# Patient Record
Sex: FEMALE | Race: WHITE | NOT HISPANIC OR LATINO | Employment: UNEMPLOYED | URBAN - METROPOLITAN AREA
[De-identification: names, ages, dates, MRNs, and addresses within clinical notes are randomized per-mention and may not be internally consistent; named-entity substitution may affect disease eponyms.]

---

## 2020-07-29 ENCOUNTER — OFFICE VISIT (OUTPATIENT)
Dept: FAMILY MEDICINE | Facility: CLINIC | Age: 60
End: 2020-07-29
Payer: COMMERCIAL

## 2020-07-29 VITALS
DIASTOLIC BLOOD PRESSURE: 82 MMHG | TEMPERATURE: 99 F | SYSTOLIC BLOOD PRESSURE: 120 MMHG | HEART RATE: 72 BPM | HEIGHT: 62 IN | WEIGHT: 293 LBS | BODY MASS INDEX: 53.92 KG/M2

## 2020-07-29 DIAGNOSIS — Z76.89 ENCOUNTER TO ESTABLISH CARE WITH NEW DOCTOR: ICD-10-CM

## 2020-07-29 DIAGNOSIS — Z12.11 COLON CANCER SCREENING: ICD-10-CM

## 2020-07-29 DIAGNOSIS — R30.0 DYSURIA: ICD-10-CM

## 2020-07-29 DIAGNOSIS — R35.0 FREQUENCY OF URINATION: ICD-10-CM

## 2020-07-29 DIAGNOSIS — N30.90 CYSTITIS: Primary | ICD-10-CM

## 2020-07-29 DIAGNOSIS — K21.9 GASTROESOPHAGEAL REFLUX DISEASE, ESOPHAGITIS PRESENCE NOT SPECIFIED: ICD-10-CM

## 2020-07-29 DIAGNOSIS — Z01.89 ENCOUNTER FOR ROUTINE LABORATORY TESTING: ICD-10-CM

## 2020-07-29 DIAGNOSIS — E66.01 CLASS 3 SEVERE OBESITY WITH BODY MASS INDEX (BMI) OF 50.0 TO 59.9 IN ADULT, UNSPECIFIED OBESITY TYPE, UNSPECIFIED WHETHER SERIOUS COMORBIDITY PRESENT: ICD-10-CM

## 2020-07-29 DIAGNOSIS — R60.0 BILATERAL LOWER EXTREMITY EDEMA: ICD-10-CM

## 2020-07-29 PROCEDURE — 81001 URINALYSIS AUTO W/SCOPE: CPT

## 2020-07-29 PROCEDURE — 99999 PR PBB SHADOW E&M-NEW PATIENT-LVL III: CPT | Mod: PBBFAC,,, | Performed by: INTERNAL MEDICINE

## 2020-07-29 PROCEDURE — 99203 OFFICE O/P NEW LOW 30 MIN: CPT | Mod: S$GLB,,, | Performed by: INTERNAL MEDICINE

## 2020-07-29 PROCEDURE — 3008F BODY MASS INDEX DOCD: CPT | Mod: CPTII,S$GLB,, | Performed by: INTERNAL MEDICINE

## 2020-07-29 PROCEDURE — 99999 PR PBB SHADOW E&M-NEW PATIENT-LVL III: ICD-10-PCS | Mod: PBBFAC,,, | Performed by: INTERNAL MEDICINE

## 2020-07-29 PROCEDURE — 87086 URINE CULTURE/COLONY COUNT: CPT

## 2020-07-29 PROCEDURE — 99203 PR OFFICE/OUTPT VISIT, NEW, LEVL III, 30-44 MIN: ICD-10-PCS | Mod: S$GLB,,, | Performed by: INTERNAL MEDICINE

## 2020-07-29 PROCEDURE — 87077 CULTURE AEROBIC IDENTIFY: CPT

## 2020-07-29 PROCEDURE — 87186 SC STD MICRODIL/AGAR DIL: CPT

## 2020-07-29 PROCEDURE — 3008F PR BODY MASS INDEX (BMI) DOCUMENTED: ICD-10-PCS | Mod: CPTII,S$GLB,, | Performed by: INTERNAL MEDICINE

## 2020-07-29 PROCEDURE — 87088 URINE BACTERIA CULTURE: CPT

## 2020-07-29 RX ORDER — GLUCOSAMINE/CHONDR SU A SOD 167-133 MG
500 CAPSULE ORAL NIGHTLY
COMMUNITY
End: 2020-11-10

## 2020-07-29 RX ORDER — IBUPROFEN 100 MG/5ML
1000 SUSPENSION, ORAL (FINAL DOSE FORM) ORAL DAILY
COMMUNITY

## 2020-07-29 RX ORDER — NITROFURANTOIN (MACROCRYSTALS) 100 MG/1
100 CAPSULE ORAL EVERY 6 HOURS
Qty: 20 CAPSULE | Refills: 0 | Status: SHIPPED | OUTPATIENT
Start: 2020-07-29 | End: 2020-07-30

## 2020-07-29 RX ORDER — GARLIC 1000 MG
1000 CAPSULE ORAL DAILY
COMMUNITY
End: 2020-11-10

## 2020-07-29 RX ORDER — PANTOPRAZOLE SODIUM 40 MG/1
40 TABLET, DELAYED RELEASE ORAL DAILY
COMMUNITY
End: 2020-11-10 | Stop reason: SDUPTHER

## 2020-07-29 NOTE — PROGRESS NOTES
Subjective:       Patient ID: Concepcion Oneill is a 60 y.o. female.      Patient here today to establish with me as her new PCP at [facility]. Past medical history and surgical history delineated and noted. Social medical history and family medical history also delineated and noted.  Review of systems obtained at length prior to physical exam being performed.  Medications reviewed as well and addressed.  Labs reviewed and ordered for follow-up as needed.      Chief Complaint: Urinary Frequency (Urinary frequency w/ some burning. S/S started Mon)    HPI  Here to est care w me as her new PCP in [facility]. .PMH and surgical hx delineated and noted; SMH/FMH also delineated and noted. ROS obtained at length prior to physical exam being performed.  GERD: on Pantoprazole 40 mg a day. Can skip days.   Cystitis:  Monday started with frequency and dysuria and yesterday as well with a pressure sensation pressure sensations okay today.  No fever or chills; POCT for urinalysis with trace leukocytes.  Class 3 obesity:  BMI 55.06; knows the importance of regular exercise and caloric restriction help her weight come down.  Bilateral lower extremity edema:  Likely from venous insufficiency and obesity.  Maintain less than 2 g sodium diet and elevate lower extremities at home; compression stockings would also help  Colon cancer screening:  Total colonoscopy requested.  Encounter for lab testing:  Needs routine labs ordered    Past Medical History:   Diagnosis Date    Obesity        Past Surgical History:   Procedure Laterality Date     SECTION      LGA; shoulders too big.     HYSTERECTOMY      w BSO; pregnant in tube; then  when pregnant in ovary later had hysterec samantha.         Social History     Tobacco Use    Smoking status: Never Smoker    Smokeless tobacco: Never Used   Substance Use Topics    Alcohol use: Not Currently     Comment: Just for Holidays    Drug use: Never        Family History   Problem Relation Age of Onset    Diabetes Sister     Heart disease Maternal Aunt     Diabetes Sister     Cancer Other         Current Outpatient Medications on File Prior to Visit   Medication Sig Dispense Refill    APPLE CIDER VINEGAR ORAL Take 450 mg by mouth 2 (two) times a day.      ascorbic acid, vitamin C, (VITAMIN C) 1000 MG tablet Take 1,000 mg by mouth once daily.      fish oil/borage/flax/om3,6,9 1 (OMEGA 3-6-9 COMPLEX ORAL) Take 1 tablet by mouth once daily.      garlic 1,000 mg Cap Take 1,000 mg by mouth once daily.      gluc ambrose/chondro ambrose A/vit C/Mn (GLUCOSAMINE 1500 COMPLEX ORAL) Take 2 tablets by mouth once daily.      glucosamine/chondr ambrose A sod (GLUCOSAMINE-CHONDROITIN) 1,500-1,200 mg/30 mL Liqd Take by mouth.      multivitamin with minerals tablet Take 1 tablet by mouth once daily.      niacin 500 MG Tab Take 500 mg by mouth every evening.      pantoprazole (PROTONIX) 40 MG tablet Take 40 mg by mouth once daily.       No current facility-administered medications on file prior to visit.         Review of Systems   Constitutional: Negative for appetite change and fever.   HENT: Negative for congestion, postnasal drip, rhinorrhea and sinus pressure.    Eyes: Negative for discharge and itching.   Respiratory: Negative for cough, chest tightness, shortness of breath and wheezing.    Cardiovascular: Negative for chest pain, palpitations and leg swelling.   Gastrointestinal: Negative for abdominal distention, abdominal pain, blood in stool, constipation, diarrhea, nausea and vomiting.   Endocrine: Negative for polydipsia, polyphagia and polyuria.   Genitourinary: Negative for dysuria and hematuria.   Musculoskeletal: Negative for arthralgias and myalgias.   Skin: Negative for rash.   Allergic/Immunologic: Negative for environmental allergies and food allergies.   Neurological: Negative for tremors, seizures and syncope.   Hematological: Negative for adenopathy. Does not  "bruise/bleed easily.       Objective:      Vitals:    07/29/20 1404   BP: 120/82   BP Location: Left arm   Pulse: 72   Temp: 99 °F (37.2 °C)   TempSrc: Temporal   Weight: (!) 136.5 kg (301 lb 0.6 oz)   Height: 5' 2" (1.575 m)     Body mass index is 55.06 kg/m².    Physical Exam  Vitals signs reviewed.   Constitutional:       Appearance: She is well-developed.   HENT:      Head: Normocephalic and atraumatic.   Neck:      Musculoskeletal: Normal range of motion and neck supple.      Thyroid: No thyromegaly.   Cardiovascular:      Rate and Rhythm: Normal rate and regular rhythm.      Heart sounds: Normal heart sounds. No murmur. No gallop.    Pulmonary:      Effort: Pulmonary effort is normal. No respiratory distress.      Breath sounds: Normal breath sounds. No wheezing or rales.   Abdominal:      General: Bowel sounds are normal. There is no distension.      Palpations: Abdomen is soft.      Tenderness: There is no abdominal tenderness. There is no guarding or rebound.   Musculoskeletal: Normal range of motion.      Right lower leg: Edema present.      Left lower leg: Edema present.      Comments: 2+ david LE edema; spider veins; no claf tenderness to palp.    Lymphadenopathy:      Cervical: No cervical adenopathy.   Skin:     Findings: No rash.   Neurological:      Mental Status: She is alert and oriented to person, place, and time.      Comments: Moves all 4 extremities fine.   Psychiatric:         Behavior: Behavior normal.         Thought Content: Thought content normal.         Assessment:       1. Cystitis    2. Dysuria    3. Frequency of urination    4. Encounter to establish care with new doctor    5. Encounter for routine laboratory testing    6. Class 3 severe obesity with body mass index (BMI) of 50.0 to 59.9 in adult, unspecified obesity type, unspecified whether serious comorbidity present    7. Bilateral lower extremity edema    8. Colon cancer screening    9. Gastroesophageal reflux disease, esophagitis " presence not specified        Plan:       Cystitis: push fluids.  Call if symptoms on resolved.  Obtain urine microscopic and culture  -     nitrofurantoin (MACRODANTIN) 100 MG capsule; Take 1 capsule (100 mg total) by mouth every 6 (six) hours.  Dispense: 20 capsule; Refill: 0    Dysuria; AZO otc for relief.   -     POCT urine dipstick without microscope    Frequency of urination  -     POCT urine dipstick without microscope    Encounter to establish care with new doctor    Encounter for routine laboratory testing: wait on old records per pt request before ordewring.  Routine labs to be ordered    Class 3 severe obesity with body mass index (BMI) of 50.0 to 59.9 in adult, unspecified obesity type, unspecified whether serious comorbidity present: Caloric restriction w regular exercise and weight reduction.    Bilateral lower extremity edema: Maintain less than 2 g sodium diet; elevate lower extremities at home; use compression stockings if possible.    Colon cancer screening  -     Case request GI: COLONOSCOPY    GERD: No bedtime snacks; weight reduction. Cont pantoprazole.

## 2020-07-29 NOTE — PATIENT INSTRUCTIONS
Cystitis: push fluids.   -     nitrofurantoin (MACRODANTIN) 100 MG capsule; Take 1 capsule (100 mg total) by mouth every 6 (six) hours.  Dispense: 20 capsule; Refill: 0    Dysuria; AZO otc for relief.   -     POCT urine dipstick without microscope    Frequency of urination  -     POCT urine dipstick without microscope    Encounter to establish care with new doctor    Encounter for routine laboratory testing: wait on old records per pt request before ordewring.     Class 3 severe obesity with body mass index (BMI) of 50.0 to 59.9 in adult, unspecified obesity type, unspecified whether serious comorbidity present: Caloric restriction w regular exercise and weight reduction.    Bilateral lower extremity edema: Maintain less than 2 g sodium diet; elevate lower extremities at home; use compression stockings if possible.    Colon cancer screening  -     Case request GI: COLONOSCOPY    GERD: No bedtime snacks; weight reduction. Cont pantoprazole.

## 2020-07-30 ENCOUNTER — TELEPHONE (OUTPATIENT)
Dept: FAMILY MEDICINE | Facility: CLINIC | Age: 60
End: 2020-07-30

## 2020-07-30 DIAGNOSIS — N30.00 ACUTE CYSTITIS WITHOUT HEMATURIA: Primary | ICD-10-CM

## 2020-07-30 LAB
BACTERIA #/AREA URNS AUTO: NORMAL /HPF
MICROSCOPIC COMMENT: NORMAL
RBC #/AREA URNS AUTO: 1 /HPF (ref 0–4)
SQUAMOUS #/AREA URNS AUTO: 1 /HPF
WBC #/AREA URNS AUTO: 3 /HPF (ref 0–5)

## 2020-07-30 RX ORDER — CEPHALEXIN 500 MG/1
500 CAPSULE ORAL EVERY 12 HOURS
Qty: 14 CAPSULE | Refills: 0 | Status: SHIPPED | OUTPATIENT
Start: 2020-07-30 | End: 2020-08-06

## 2020-07-30 NOTE — TELEPHONE ENCOUNTER
Please have patient stop the nitrofurantoin.  Please have her continue to push fluids during the day.  Please tell her I sent in cephalexin or generic Keflex 500 mg every 12 hr for her to take for 7 days to her pharmacist in nitrofurantoin's place

## 2020-07-30 NOTE — TELEPHONE ENCOUNTER
----- Message from Claudette Holbrook sent at 7/30/2020  9:01 AM CDT -----  Regarding: Medication Issues  Contact: Patient  Type: Needs Medical Advice    Who Called:  patient    Symptoms (please be specific): nausea, stomach cramps.    How long has patient had these symptoms:  couple days    Pharmacy name and phone #:  [id number] - [place] - [address] [address] [phone] (Phone)     Best Call Back Number: [phone]    Additional Information:   Patient was put on nitrofurantoin (MACRODANTIN) 100 MG capsule on 7/29 & believes it is causing these side effects.  Requesting call to discuss & maybe new med sent in,

## 2020-07-30 NOTE — TELEPHONE ENCOUNTER
Spoke with pt and she states the symptoms started last night when she took her second dose of medication. Cramps and nausea within an hour of taking medication.  Please advise if there is an alternative medication the pt can take.

## 2020-07-30 NOTE — TELEPHONE ENCOUNTER
Spoke w/ pt. Advised as recommended. Pt agreed and at next dose due for antibiotic, she will start the keflex.

## 2020-08-01 LAB — BACTERIA UR CULT: ABNORMAL

## 2020-08-09 ENCOUNTER — TELEPHONE (OUTPATIENT)
Dept: FAMILY MEDICINE | Facility: CLINIC | Age: 60
End: 2020-08-09

## 2020-08-09 PROBLEM — Z12.11 COLON CANCER SCREENING: Status: ACTIVE | Noted: 2020-08-09

## 2020-08-09 PROBLEM — Z01.89 ENCOUNTER FOR ROUTINE LABORATORY TESTING: Status: ACTIVE | Noted: 2020-08-09

## 2020-08-09 PROBLEM — R60.0 BILATERAL LOWER EXTREMITY EDEMA: Status: ACTIVE | Noted: 2020-08-09

## 2020-08-09 PROBLEM — K21.9 GASTROESOPHAGEAL REFLUX DISEASE: Status: ACTIVE | Noted: 2020-08-09

## 2020-08-09 NOTE — TELEPHONE ENCOUNTER
Please call patient and ask her to schedule her follow-up appointment with me in 3 months from her recent visit.  Please also see  that a request has been sent for old records from Dr. Santos Muniz her [facility].  Please also tell patient that the nitrofurantoin should have covered Proteus mirabilis she grew in her urine culture.  But let me know if she is still having symptoms after the antibiotics. Thank you

## 2020-08-14 DIAGNOSIS — Z12.39 BREAST CANCER SCREENING: ICD-10-CM

## 2020-09-10 ENCOUNTER — TELEPHONE (OUTPATIENT)
Dept: FAMILY MEDICINE | Facility: CLINIC | Age: 60
End: 2020-09-10

## 2020-09-10 ENCOUNTER — TELEPHONE (OUTPATIENT)
Dept: GASTROENTEROLOGY | Facility: CLINIC | Age: 60
End: 2020-09-10

## 2020-09-10 DIAGNOSIS — Z01.818 PREOP TESTING: ICD-10-CM

## 2020-09-10 NOTE — TELEPHONE ENCOUNTER
----- Message from Mariya Burnette sent at 9/10/2020  3:48 PM CDT -----  Regarding: return call to Mariella  Contact: Concepcion rosas  Type:  Patient Returning Call    Who Called:  Concepcion  Who Left Message for Patient:  Mariella  Does the patient know what this is regarding?:  colonoscopy  Best Call Back Number:  [phone]  Additional Information:  Pls call regarding

## 2020-09-12 ENCOUNTER — LAB VISIT (OUTPATIENT)
Dept: FAMILY MEDICINE | Facility: CLINIC | Age: 60
End: 2020-09-12
Payer: COMMERCIAL

## 2020-09-12 DIAGNOSIS — Z01.818 PREOP TESTING: ICD-10-CM

## 2020-09-12 LAB — SARS-COV-2 RNA RESP QL NAA+PROBE: NOT DETECTED

## 2020-09-12 PROCEDURE — U0003 INFECTIOUS AGENT DETECTION BY NUCLEIC ACID (DNA OR RNA); SEVERE ACUTE RESPIRATORY SYNDROME CORONAVIRUS 2 (SARS-COV-2) (CORONAVIRUS DISEASE [COVID-19]), AMPLIFIED PROBE TECHNIQUE, MAKING USE OF HIGH THROUGHPUT TECHNOLOGIES AS DESCRIBED BY CMS-2020-01-R: HCPCS

## 2020-09-14 ENCOUNTER — ANESTHESIA EVENT (OUTPATIENT)
Dept: ENDOSCOPY | Facility: HOSPITAL | Age: 60
End: 2020-09-14
Payer: COMMERCIAL

## 2020-09-15 ENCOUNTER — ANESTHESIA (OUTPATIENT)
Dept: ENDOSCOPY | Facility: HOSPITAL | Age: 60
End: 2020-09-15
Payer: COMMERCIAL

## 2020-09-15 ENCOUNTER — HOSPITAL ENCOUNTER (OUTPATIENT)
Facility: HOSPITAL | Age: 60
Discharge: HOME | End: 2020-09-15
Attending: INTERNAL MEDICINE | Admitting: INTERNAL MEDICINE
Payer: COMMERCIAL

## 2020-09-15 DIAGNOSIS — Z12.11 SCREEN FOR COLON CANCER: ICD-10-CM

## 2020-09-15 PROCEDURE — 63600175 PHARM REV CODE 636 W HCPCS: Mod: PO | Performed by: NURSE ANESTHETIST, CERTIFIED REGISTERED

## 2020-09-15 PROCEDURE — 45385 COLONOSCOPY W/LESION REMOVAL: CPT | Mod: 33,,, | Performed by: INTERNAL MEDICINE

## 2020-09-15 PROCEDURE — 45385 PR COLONOSCOPY,REMV LESN,SNARE: ICD-10-PCS | Mod: 33,,, | Performed by: INTERNAL MEDICINE

## 2020-09-15 PROCEDURE — D9220A PRA ANESTHESIA: ICD-10-PCS | Mod: ANES,,, | Performed by: ANESTHESIOLOGY

## 2020-09-15 PROCEDURE — D9220A PRA ANESTHESIA: Mod: CRNA,,, | Performed by: NURSE ANESTHETIST, CERTIFIED REGISTERED

## 2020-09-15 PROCEDURE — 25000003 PHARM REV CODE 250: Mod: PO | Performed by: NURSE ANESTHETIST, CERTIFIED REGISTERED

## 2020-09-15 PROCEDURE — 88305 TISSUE EXAM BY PATHOLOGIST: ICD-10-PCS | Mod: 26,,, | Performed by: PATHOLOGY

## 2020-09-15 PROCEDURE — 45385 COLONOSCOPY W/LESION REMOVAL: CPT | Mod: PO | Performed by: INTERNAL MEDICINE

## 2020-09-15 PROCEDURE — 88305 TISSUE EXAM BY PATHOLOGIST: CPT | Mod: 26,,, | Performed by: PATHOLOGY

## 2020-09-15 PROCEDURE — D9220A PRA ANESTHESIA: ICD-10-PCS | Mod: CRNA,,, | Performed by: NURSE ANESTHETIST, CERTIFIED REGISTERED

## 2020-09-15 PROCEDURE — D9220A PRA ANESTHESIA: Mod: ANES,,, | Performed by: ANESTHESIOLOGY

## 2020-09-15 PROCEDURE — 88305 TISSUE EXAM BY PATHOLOGIST: CPT | Performed by: PATHOLOGY

## 2020-09-15 PROCEDURE — 37000009 HC ANESTHESIA EA ADD 15 MINS: Mod: PO | Performed by: INTERNAL MEDICINE

## 2020-09-15 PROCEDURE — 37000008 HC ANESTHESIA 1ST 15 MINUTES: Mod: PO | Performed by: INTERNAL MEDICINE

## 2020-09-15 PROCEDURE — 63600175 PHARM REV CODE 636 W HCPCS: Mod: PO | Performed by: INTERNAL MEDICINE

## 2020-09-15 PROCEDURE — 27201089 HC SNARE, DISP (ANY): Mod: PO | Performed by: INTERNAL MEDICINE

## 2020-09-15 RX ORDER — SODIUM CHLORIDE, SODIUM LACTATE, POTASSIUM CHLORIDE, CALCIUM CHLORIDE 600; 310; 30; 20 MG/100ML; MG/100ML; MG/100ML; MG/100ML
INJECTION, SOLUTION INTRAVENOUS CONTINUOUS
Status: DISCONTINUED | OUTPATIENT
Start: 2020-09-15 | End: 2020-09-15 | Stop reason: HOSPADM

## 2020-09-15 RX ORDER — PROPOFOL 10 MG/ML
VIAL (ML) INTRAVENOUS
Status: DISCONTINUED | OUTPATIENT
Start: 2020-09-15 | End: 2020-09-15

## 2020-09-15 RX ORDER — SODIUM CHLORIDE 0.9 % (FLUSH) 0.9 %
10 SYRINGE (ML) INJECTION
Status: DISCONTINUED | OUTPATIENT
Start: 2020-09-15 | End: 2020-09-15 | Stop reason: HOSPADM

## 2020-09-15 RX ORDER — LIDOCAINE HCL/PF 100 MG/5ML
SYRINGE (ML) INTRAVENOUS
Status: DISCONTINUED | OUTPATIENT
Start: 2020-09-15 | End: 2020-09-15

## 2020-09-15 RX ADMIN — PROPOFOL 100 MG: 10 INJECTION, EMULSION INTRAVENOUS at 07:09

## 2020-09-15 RX ADMIN — PROPOFOL 50 MG: 10 INJECTION, EMULSION INTRAVENOUS at 07:09

## 2020-09-15 RX ADMIN — LIDOCAINE HYDROCHLORIDE 100 MG: 20 INJECTION, SOLUTION INTRAVENOUS at 07:09

## 2020-09-15 RX ADMIN — PROPOFOL 150 MG: 10 INJECTION, EMULSION INTRAVENOUS at 07:09

## 2020-09-15 RX ADMIN — SODIUM CHLORIDE, SODIUM LACTATE, POTASSIUM CHLORIDE, AND CALCIUM CHLORIDE: .6; .31; .03; .02 INJECTION, SOLUTION INTRAVENOUS at 06:09

## 2020-09-15 NOTE — TRANSFER OF CARE
"Anesthesia Transfer of Care Note    Patient: Concepcion Oneill    Procedure(s) Performed: Procedure(s) (LRB):  COLONOSCOPY (N/A)    Patient location: PACU    Anesthesia Type: general    Transport from OR: Transported from OR on room air with adequate spontaneous ventilation    Post pain: adequate analgesia    Post assessment: no apparent anesthetic complications and tolerated procedure well    Post vital signs: stable    Level of consciousness: awake    Nausea/Vomiting: no nausea/vomiting    Complications: none    Transfer of care protocol was followed      Last vitals:   Visit Vitals  BP (!) 141/76 (BP Location: Right arm, Patient Position: Sitting)   Pulse 72   Temp 36.6 °C (97.9 °F) (Skin)   Resp 16   Ht 5' 2" (1.575 m)   Wt 136.1 kg (300 lb)   SpO2 97%   Breastfeeding No   BMI 54.87 kg/m²     "

## 2020-09-15 NOTE — ANESTHESIA POSTPROCEDURE EVALUATION
Anesthesia Post Evaluation    Patient: Concepcion Oneill    Procedure(s) Performed: Procedure(s) (LRB):  COLONOSCOPY (N/A)    Final Anesthesia Type: general    Patient location during evaluation: PACU  Patient participation: Yes- Able to Participate  Level of consciousness: awake and alert  Post-procedure vital signs: reviewed and stable  Pain management: adequate  Airway patency: patent    PONV status at discharge: No PONV  Anesthetic complications: no      Cardiovascular status: blood pressure returned to baseline  Respiratory status: unassisted  Hydration status: euvolemic  Follow-up not needed.          Vitals Value Taken Time   /74 09/15/20 0800   Temp 36.4 °C (97.5 °F) 09/15/20 0726   Pulse 71 09/15/20 0800   Resp 16 09/15/20 0800   SpO2 98 % 09/15/20 0800         Event Time   Out of Recovery 08:12:32         Pain/Ronnie Score: Ronnie Score: 10 (9/15/2020  8:00 AM)

## 2020-09-15 NOTE — PLAN OF CARE
VSS, all questions answered. Denies recent fever or illness. Pt BMI: 54- Dr. Morgan with Anesthesia notified. Pt states ready for procedure.

## 2020-09-15 NOTE — H&P
History & Physical - Short Stay  Gastroenterology      SUBJECTIVE:     Procedure: Colonoscopy    Chief Complaint/Indication for Procedure: Screening    PTA Medications   Medication Sig    ascorbic acid, vitamin C, (VITAMIN C) 1000 MG tablet Take 1,000 mg by mouth once daily.    gluc ambrose/chondro ambrose A/vit C/Mn (GLUCOSAMINE 1500 COMPLEX ORAL) Take 2 tablets by mouth once daily.    glucosamine/chondr ambrose A sod (GLUCOSAMINE-CHONDROITIN) 1,500-1,200 mg/30 mL Liqd Take by mouth.    multivitamin with minerals tablet Take 1 tablet by mouth once daily.    pantoprazole (PROTONIX) 40 MG tablet Take 40 mg by mouth once daily.    APPLE CIDER VINEGAR ORAL Take 450 mg by mouth 2 (two) times a day.    fish oil/borage/flax/om3,6,9 1 (OMEGA 3-6-9 COMPLEX ORAL) Take 1 tablet by mouth once daily.    garlic 1,000 mg Cap Take 1,000 mg by mouth once daily.    niacin 500 MG Tab Take 500 mg by mouth every evening.       Review of patient's allergies indicates:   Allergen Reactions    Nitrofurantoin      Sec dose led to cramps and nausea so was discontinued        Past Medical History:   Diagnosis Date    Arthritis     Bronchitis in child     Obesity     Sleep apnea     uses cpap     Past Surgical History:   Procedure Laterality Date     SECTION      LGA; shoulders too big.     COLONOSCOPY      HYSTERECTOMY      w BSO; pregnant in tube; then  when pregnant in ovary later had hysterec samantha.      Family History   Problem Relation Age of Onset    Diabetes Sister     Heart disease Maternal Aunt     Diabetes Sister     Cancer Other     Cancer Paternal Uncle         colon cancer     Social History     Tobacco Use    Smoking status: Never Smoker    Smokeless tobacco: Never Used   Substance Use Topics    Alcohol use: Not Currently     Comment: Just for Holidays    Drug use: Never         OBJECTIVE:     Vital Signs (Most Recent)  Temp: 97.9 °F (36.6 °C) (09/15/20 0627)  Pulse: 72 (09/15/20 0627)  Resp: 16  (09/15/20 0627)  BP: (!) 141/76 (09/15/20 0627)  SpO2: 97 % (09/15/20 0627)    Physical Exam                                                        GENERAL:  Comfortable, in no acute distress.                                 HEENT EXAM:  Nonicteric.  No adenopathy.  Oropharynx is clear.               NECK:  Supple.                                                               LUNGS:  Clear.                                                               CARDIAC:  Regular rate and rhythm.  S1, S2.  No murmur.                      ABDOMEN:  Soft, positive bowel sounds, nontender.  No hepatosplenomegaly or masses.  No rebound or guarding.                                             EXTREMITIES:  No edema.     MENTAL STATUS:  Normal, alert and oriented.      ASSESSMENT/PLAN:     Assessment: Colorectal cancer screening    Plan: Colonoscopy    Anesthesia Plan: General    ASA Grade: ASA 2 - Patient with mild systemic disease with no functional limitations    MALLAMPATI SCORE:  I (soft palate, uvula, fauces, and tonsillar pillars visible)     In my medical opinion and judgment, this medical or surgical procedure was not able to be safely postponed in accordance with [place] Department of Health, Healthcare Facility Notice #2020-COVID19-ALL-007.

## 2020-09-15 NOTE — ANESTHESIA PREPROCEDURE EVALUATION
09/15/2020  Concepcion Oneill is a 60 y.o., female.    Anesthesia Evaluation    I have reviewed the Patient Summary Reports.    I have reviewed the Nursing Notes. I have reviewed the NPO Status.   I have reviewed the Medications.     Review of Systems  Anesthesia Hx:  Denies Family Hx of Anesthesia complications.   Denies Personal Hx of Anesthesia complications.   Pulmonary:   Sleep Apnea Intolerance/noncompliance for CPAP   Education provided regarding risk of obstructive sleep apnea     Hepatic/GI:   Bowel Prep. GERD        Physical Exam  General:  Malnutrition, Morbid Obesity    Airway/Jaw/Neck:  Airway Findings: Mouth Opening: Small, but > 3cm Tongue: Normal  General Airway Assessment: Adult, Possible difficult mask airway, Possible difficult intubation  Mallampati: IV  TM Distance: 4 - 6 cm  Jaw/Neck Findings:  Neck ROM: Extension Decreased, Mild  Neck Findings:  Girth Increased     Eyes/Ears/Nose:  EYES/EARS/NOSE FINDINGS: Normal    Chest/Lungs:  Chest/Lungs Findings: Normal Respiratory Rate     Heart/Vascular:  Heart Findings: Rate: Normal  Rhythm: Regular Rhythm        Mental Status:  Mental Status Findings:  Alert and Oriented, Cooperative, Anxious         Anesthesia Plan  Type of Anesthesia, risks & benefits discussed:  Anesthesia Type:  general  Patient's Preference:   Intra-op Monitoring Plan: standard ASA monitors  Intra-op Monitoring Plan Comments:   Post Op Pain Control Plan:   Post Op Pain Control Plan Comments:   Induction:   IV  Beta Blocker:  Patient is not currently on a Beta-Blocker (No further documentation required).       Informed Consent: Patient understands risks and agrees with Anesthesia plan.  Questions answered. Anesthesia consent signed with patient.  ASA Score: 4     Day of Surgery Review of History & Physical:    H&P update referred to the provider.         Ready For Surgery  From Anesthesia Perspective.

## 2020-09-15 NOTE — [employer] PATIENT INSTRUCTIONS
Discharge Summary/Instructions after an Endoscopic Procedure  Patient Name: Concepcion Oneill  Patient MRN: [id number]  Patient YOB: 1960  Tuesday, September 15, 2020  Phan Chakraborty MD  RESTRICTIONS:  During your procedure today, you received medications for sedation.  These   medications may affect your judgment, balance and coordination.  Therefore,   for 24 hours, you have the following restrictions:   - DO NOT drive a car, operate machinery, make legal/financial decisions,   sign important papers or drink alcohol.    ACTIVITY:  Today: no heavy lifting, straining or running due to procedural   sedation/anesthesia.  The following day: return to full activity including work.  DIET:  Eat and drink normally unless instructed otherwise.     TREATMENT FOR COMMON SIDE EFFECTS:  - Mild abdominal pain, nausea, belching, bloating or excessive gas:  rest,   eat lightly and use a heating pad.  - Sore Throat: treat with throat lozenges and/or gargle with warm salt   water.  - Because air was used during the procedure, expelling large amounts of air   from your rectum or belching is normal.  - If a bowel prep was taken, you may not have a bowel movement for 1-3 days.    This is normal.  SYMPTOMS TO WATCH FOR AND REPORT TO YOUR PHYSICIAN:  1. Abdominal pain or bloating, other than gas cramps.  2. Chest pain.  3. Back pain.  4. Signs of infection such as: chills or fever occurring within 24 hours   after the procedure.  5. Rectal bleeding, which would show as bright red, maroon, or black stools.   (A tablespoon of blood from the rectum is not serious, especially if   hemorrhoids are present.)  6. Vomiting.  7. Weakness or dizziness.  GO DIRECTLY TO THE NEAREST EMERGENCY ROOM IF YOU HAVE ANY OF THE FOLLOWING:      Difficulty breathing              Chills and/or fever over 101 F   Persistent vomiting and/or vomiting blood   Severe abdominal pain   Severe chest pain   Black, tarry stools   Bleeding- more than one  tablespoon   Any other symptom or condition that you feel may need urgent attention  Your doctor recommends these additional instructions:  If any biopsies were taken, your doctors clinic will contact you in 1 to 2   weeks with any results.  We are waiting for your pathology results.   Your physician has recommended a repeat colonoscopy in five years for   surveillance based on pathology results.   You are being discharged to home.  For questions, problems or results please call your physician - Phan Chakraborty MD at Work:  [phone].  EMERGENCY PHONE NUMBER: [phone], LAB RESULTS: [phone]  IF A COMPLICATION OR EMERGENCY SITUATION ARISES AND YOU ARE UNABLE TO REACH   YOUR PHYSICIAN - GO DIRECTLY TO THE EMERGENCY ROOM.  ___________________________________________  Nurse Signature  ___________________________________________  Patient/Designated Responsible Party Signature  Phan Chakraborty MD  9/15/2020 7:25:33 AM  This report has been verified and signed electronically.  [employer]

## 2020-09-15 NOTE — DISCHARGE SUMMARY
Discharge Note  Short Stay      SUMMARY     Admit Date: 9/15/2020    Attending Physician: Phan Chakraborty MD     Discharge Physician: Phan Chakraborty MD    Discharge Date: 9/15/2020 7:26 AM    Final Diagnosis: Colon cancer screening [Z12.11]    Disposition: HOME OR SELF CARE    Patient Instructions:   Current Discharge Medication List      CONTINUE these medications which have NOT CHANGED    Details   ascorbic acid, vitamin C, (VITAMIN C) 1000 MG tablet Take 1,000 mg by mouth once daily.      gluc ambrose/chondro ambrose A/vit C/Mn (GLUCOSAMINE 1500 COMPLEX ORAL) Take 2 tablets by mouth once daily.      glucosamine/chondr ambrose A sod (GLUCOSAMINE-CHONDROITIN) 1,500-1,200 mg/30 mL Liqd Take by mouth.      multivitamin with minerals tablet Take 1 tablet by mouth once daily.      pantoprazole (PROTONIX) 40 MG tablet Take 40 mg by mouth once daily.      APPLE CIDER VINEGAR ORAL Take 450 mg by mouth 2 (two) times a day.      fish oil/borage/flax/om3,6,9 1 (OMEGA 3-6-9 COMPLEX ORAL) Take 1 tablet by mouth once daily.      garlic 1,000 mg Cap Take 1,000 mg by mouth once daily.      niacin 500 MG Tab Take 500 mg by mouth every evening.             Discharge Procedure Orders (must include Diet, Follow-up, Activity)    Follow Up:  Follow up with PCP as previously scheduled  Resume routine diet.  Activity as tolerated.    No driving day of procedure.

## 2020-09-16 VITALS
HEART RATE: 71 BPM | TEMPERATURE: 98 F | BODY MASS INDEX: 53.92 KG/M2 | SYSTOLIC BLOOD PRESSURE: 120 MMHG | OXYGEN SATURATION: 98 % | HEIGHT: 62 IN | WEIGHT: 293 LBS | DIASTOLIC BLOOD PRESSURE: 74 MMHG | RESPIRATION RATE: 16 BRPM

## 2020-09-18 LAB
FINAL PATHOLOGIC DIAGNOSIS: NORMAL
GROSS: NORMAL

## 2020-09-28 ENCOUNTER — NURSE TRIAGE (OUTPATIENT)
Dept: ADMINISTRATIVE | Facility: CLINIC | Age: 60
End: 2020-09-28

## 2020-09-28 NOTE — TELEPHONE ENCOUNTER
Pt did not answer.  Per PP guidelines, no voicemail left and no further PP tracking warranted.  The pt's procedure was on 9/15/20.    Reason for Disposition   No answer.  First attempt to contact caller.  Follow-up call scheduled within 15 minutes.    Protocols used: NO CONTACT OR DUPLICATE CONTACT CALL-A-AH

## 2020-11-10 ENCOUNTER — OFFICE VISIT (OUTPATIENT)
Dept: FAMILY MEDICINE | Facility: CLINIC | Age: 60
End: 2020-11-10
Payer: COMMERCIAL

## 2020-11-10 VITALS
BODY MASS INDEX: 53.92 KG/M2 | HEIGHT: 62 IN | SYSTOLIC BLOOD PRESSURE: 142 MMHG | OXYGEN SATURATION: 98 % | DIASTOLIC BLOOD PRESSURE: 92 MMHG | TEMPERATURE: 97 F | WEIGHT: 293 LBS | HEART RATE: 79 BPM

## 2020-11-10 DIAGNOSIS — K63.5 HYPERPLASTIC COLONIC POLYP, UNSPECIFIED PART OF COLON: ICD-10-CM

## 2020-11-10 DIAGNOSIS — R03.0 ELEVATED BLOOD PRESSURE READING IN OFFICE WITHOUT DIAGNOSIS OF HYPERTENSION: ICD-10-CM

## 2020-11-10 DIAGNOSIS — R60.0 BILATERAL LOWER EXTREMITY EDEMA: Primary | ICD-10-CM

## 2020-11-10 DIAGNOSIS — Z12.11 SCREEN FOR COLON CANCER: ICD-10-CM

## 2020-11-10 DIAGNOSIS — K21.9 GASTROESOPHAGEAL REFLUX DISEASE, UNSPECIFIED WHETHER ESOPHAGITIS PRESENT: ICD-10-CM

## 2020-11-10 DIAGNOSIS — E78.49 OTHER HYPERLIPIDEMIA: ICD-10-CM

## 2020-11-10 DIAGNOSIS — E66.01 CLASS 3 SEVERE OBESITY WITH BODY MASS INDEX (BMI) OF 50.0 TO 59.9 IN ADULT, UNSPECIFIED OBESITY TYPE, UNSPECIFIED WHETHER SERIOUS COMORBIDITY PRESENT: ICD-10-CM

## 2020-11-10 DIAGNOSIS — Z01.89 ENCOUNTER FOR ROUTINE LABORATORY TESTING: ICD-10-CM

## 2020-11-10 DIAGNOSIS — Z00.00 HEALTHCARE MAINTENANCE: ICD-10-CM

## 2020-11-10 DIAGNOSIS — Z83.3 FAMILY HISTORY OF DIABETES MELLITUS (DM): ICD-10-CM

## 2020-11-10 PROCEDURE — 3008F PR BODY MASS INDEX (BMI) DOCUMENTED: ICD-10-PCS | Mod: CPTII,S$GLB,, | Performed by: INTERNAL MEDICINE

## 2020-11-10 PROCEDURE — 99999 PR PBB SHADOW E&M-EST. PATIENT-LVL III: ICD-10-PCS | Mod: PBBFAC,,, | Performed by: INTERNAL MEDICINE

## 2020-11-10 PROCEDURE — 99215 PR OFFICE/OUTPT VISIT, EST, LEVL V, 40-54 MIN: ICD-10-PCS | Mod: S$GLB,,, | Performed by: INTERNAL MEDICINE

## 2020-11-10 PROCEDURE — 99999 PR PBB SHADOW E&M-EST. PATIENT-LVL III: CPT | Mod: PBBFAC,,, | Performed by: INTERNAL MEDICINE

## 2020-11-10 PROCEDURE — 3008F BODY MASS INDEX DOCD: CPT | Mod: CPTII,S$GLB,, | Performed by: INTERNAL MEDICINE

## 2020-11-10 PROCEDURE — 99215 OFFICE O/P EST HI 40 MIN: CPT | Mod: S$GLB,,, | Performed by: INTERNAL MEDICINE

## 2020-11-10 RX ORDER — PANTOPRAZOLE SODIUM 40 MG/1
TABLET, DELAYED RELEASE ORAL
Qty: 90 TABLET | Refills: 1 | Status: SHIPPED | OUTPATIENT
Start: 2020-11-10 | End: 2023-07-27

## 2020-11-10 NOTE — PATIENT INSTRUCTIONS
Bilateral lower extremity edema: Maintain less than 2 g sodium diet; elevate lower extremities at home; use compression stockings if possible. Medium strength at 16-18 mmHg to below the knee. .     Other hyperlipidemia: Maintain low fat high fiber diet, exercise regularly. Weight reduction where indicated. Exercise regularly also  -     Comprehensive Metabolic Panel; Future; Expected date: 11/10/2020  -     Lipid Panel; Future; Expected date: 11/10/2020    Screen for colon cancer; recent TC 9/15/20 w hep flexure polyp removed; hyperplastic. Repeat TC in 5 yrs. Dr Chakraborty.     Hyperplastic colonic polyp, unspecified part of colon: high fiber diet.     Healthcare maintenance  -     Ambulatory referral/consult to Obstetrics / Gynecology; Future; Expected date: 11/17/2020 Dr TABBY Olson for breast exams, pelvic, and pap.   -     CBC Auto Differential; Future; Expected date: 11/10/2020  -     Comprehensive Metabolic Panel; Future; Expected date: 11/10/2020  -     Hemoglobin A1C; Future; Expected date: 11/10/2020  -     Lipid Panel; Future; Expected date: 11/10/2020  -     T4, Free; Future; Expected date: 11/10/2020  -     TSH; Future; Expected date: 11/10/2020    Class 3 severe obesity with body mass index (BMI) of 50.0 to 59.9 in adult, unspecified obesity type, unspecified whether serious comorbidity present: Caloric restriction w regular exercise and weight reduction.  -     Comprehensive Metabolic Panel; Future; Expected date: 11/10/2020  -     Hemoglobin A1C; Future; Expected date: 11/10/2020  -     Lipid Panel; Future; Expected date: 11/10/2020    Gastroesophageal reflux disease, unspecified whether esophagitis present: No bedtime snacks; weight reduction. Use pantoprazole 40 mg a day as needed for reflux.   -     pantoprazole (PROTONIX) 40 MG tablet; 40 mg po daily as needed for reflux.  Dispense: 90 tablet; Refill: 1    Encounter for routine laboratory testing  -     CBC Auto Differential; Future; Expected date:  11/10/2020  -     Comprehensive Metabolic Panel; Future; Expected date: 11/10/2020  -     Hemoglobin A1C; Future; Expected date: 11/10/2020  -     Lipid Panel; Future; Expected date: 11/10/2020  -     T4, Free; Future; Expected date: 11/10/2020  -     TSH; Future; Expected date: 11/10/2020

## 2020-11-10 NOTE — PROGRESS NOTES
Subjective:       Patient ID: Concepcion Oneill is a 60 y.o. female.    Chief Complaint: Follow-up    HPI Here today for reassessment; haven't been able to obtain her old labs w her old records as well yet from Dr Muniz; will re-submit request. Now is the time she would be renewing her labs anyway. Needs her mammogram redone as screen as well. Orders place 8/2020 already for her to do; she can schedule this today.  Old records reviewed in epic     Encounter for lab test:  Needs to have new orders for her labs..  Can also review old labs once her old records come in from her PCP Dr. Muniz     GERD: needs to avoid bedtime snacks. Exercise w weight reduction. Use pantoprazole 40 mg a day as needed.  Refilled for patient     Other HLP:  History of mildly elevated cholesterol in the past by patient account.  Maintain low fat with high fiber diet, exercise regularly needed. Weight reduction needed     Sampson LE edema; doing about the same. .Maintain less than 2 g sodium diet; elevate lower extremities at home; use compression stockings if possible to below the knee and medium strength at 16-18. MmHg.     Elevated BP without dx of HTN: limit salt to <2 gm Na a day. Monitor BP from time to time.  BP here 148/90 then on repeat 142/92; will reassess on follow-up after salt restriction and exercise     Colon cancer scree/ colon polyp removed: TC 9/15/20: one hyperplastic polyp removed from hepatic flexure ; repeat TC in 5 yrs; Dr Chakraborty.  Colonoscopy report and pathology reviewed with patient     Breast cancer screen: Mammogram screen due for update.  Ordered previously in August for patient; can be scheduled today  OBGYN consult placed for female preventative medicine examination     Female healthcare exam: needs Gyn for breast exam, pelvic and paps. Last exams done years ago by patient's account. Ob/Gyn consult to Dr TABBY Olson; s/p total hysterectomy w BSO [id number].      Obesity: stage 3: BMI 55.6; Caloric restriction w  regular exercise and weight reduction.  Has gained 4 lb since 09/15/2020; goal for regular exercise 5 times a week minimum 30 min as well as smaller portions with low-salt low-fat high-fiber diet.     Family history of diabetes:  Mom, 2 sisters, and paternal grandfather, all with diabetes..  Paternal grandfather reportedly lost his vision due to diabetes     Total time 9:55 a.m. through 10:58 a.m..  Greater than 50% of time spent in discussion, counseling, and review.  Labs discussed and ordered for follow-up.  OBGYN YN consult to be placed to Dr. Karissa Olson for female preventative medicine examination; mammogram screening to be scheduled with patient. Colonoscopy report results as well as pathology discussed with patient  Labs to be obtain on follow-up for annual physical after an overnight fast of 8 hr.  12 min spent on additional documentation and review afterwards.    Review of Systems   Constitutional: Negative for appetite change and fever.   HENT: Negative for congestion, postnasal drip, rhinorrhea and sinus pressure.    Eyes: Negative for discharge and itching.   Respiratory: Negative for cough, chest tightness, shortness of breath and wheezing.    Cardiovascular: Positive for leg swelling. Negative for chest pain and palpitations.        Has been chronic. Stable.    Gastrointestinal: Negative for abdominal distention, abdominal pain, blood in stool, constipation, diarrhea, nausea and vomiting.   Endocrine: Negative for polydipsia, polyphagia and polyuria.   Genitourinary: Negative for dysuria and hematuria.   Musculoskeletal: Negative for arthralgias and myalgias.   Skin: Negative for rash.   Allergic/Immunologic: Negative for environmental allergies and food allergies.   Neurological: Negative for tremors, seizures and syncope.   Hematological: Negative for adenopathy. Does not bruise/bleed easily.   Psychiatric/Behavioral: Negative for dysphoric mood. The patient is not nervous/anxious.       "  Objective:      Vitals:    11/10/20 0944   BP: (!) 148/90   BP Location: Left arm   Patient Position: Sitting   BP Method: Large (Manual)   Pulse: 79   Temp: 97 °F (36.1 °C)   TempSrc: Temporal   SpO2: 98%   Weight: (!) 137.9 kg (304 lb 0.2 oz)   Height: 5' 2" (1.575 m)     Body mass index is 55.6 kg/m².  Wt Readings from Last 3 Encounters:   11/10/20 (!) 137.9 kg (304 lb 0.2 oz)   09/15/20 136.1 kg (300 lb)   07/29/20 (!) 136.5 kg (301 lb 0.6 oz)        Physical Exam  Vitals signs reviewed.   Constitutional:       Appearance: She is well-developed.   HENT:      Head: Normocephalic and atraumatic.   Neck:      Musculoskeletal: Normal range of motion and neck supple.      Thyroid: No thyromegaly.      Vascular: No carotid bruit.   Cardiovascular:      Rate and Rhythm: Normal rate and regular rhythm.      Heart sounds: Normal heart sounds. No murmur. No gallop.    Pulmonary:      Effort: Pulmonary effort is normal. No respiratory distress.      Breath sounds: Normal breath sounds. No wheezing or rales.   Abdominal:      General: Bowel sounds are normal. There is no distension.      Palpations: Abdomen is soft.      Tenderness: There is no abdominal tenderness. There is no guarding or rebound.   Musculoskeletal: Normal range of motion.      Right lower leg: Edema present.      Left lower leg: Edema present.      Comments: 3+ david LE edema; no calf tenderness riley palp. Spider veins noted bilaterally.    Lymphadenopathy:      Cervical: No cervical adenopathy.   Skin:     Findings: No rash.   Neurological:      Mental Status: She is alert and oriented to person, place, and time.      Comments: Moves all 4 extremities fine.   Psychiatric:         Behavior: Behavior normal.         Thought Content: Thought content normal.         Assessment:       1. Bilateral lower extremity edema    2. Other hyperlipidemia    3. Screen for colon cancer    4. Hyperplastic colonic polyp, unspecified part of colon    5. Healthcare " maintenance    6. Class 3 severe obesity with body mass index (BMI) of 50.0 to 59.9 in adult, unspecified obesity type, unspecified whether serious comorbidity present    7. Gastroesophageal reflux disease, unspecified whether esophagitis present    8. Encounter for routine laboratory testing    9. Family history of diabetes mellitus (DM)    10. Elevated blood pressure reading in office without diagnosis of hypertension        Plan:       Bilateral lower extremity edema: Maintain less than 2 g sodium diet; elevate lower extremities at home; use compression stockings if possible. Medium strength at 16-18 mmHg to below the knee.  If unimproved on follow-up consideration for starting low-dose diuretic.     Other hyperlipidemia: Maintain low fat high fiber diet, exercise regularly. Weight reduction where indicated. Exercise regularly also  -     Comprehensive Metabolic Panel; Future; Expected date: 11/10/2020  -     Lipid Panel; Future; Expected date: 11/10/2020    Screen for colon cancer; recent TC 9/15/20 w hep flexure polyp removed; hyperplastic. Repeat TC in 5 yrs. Dr Chakraborty.     Hyperplastic colonic polyp, unspecified part of colon: high fiber diet.     Healthcare maintenance:  OBGYN consult placed to Dr. Karissa Olson for female preventative examination; labs also reviewed and discussed with patient as well  -     Ambulatory referral/consult to Obstetrics / Gynecology; Future; Expected date: 11/17/2020 Dr TABBY Olson for breast exams, pelvic, and pap.   -     CBC Auto Differential; Future; Expected date: 11/10/2020  -     Comprehensive Metabolic Panel; Future; Expected date: 11/10/2020  -     Hemoglobin A1C; Future; Expected date: 11/10/2020  -     Lipid Panel; Future; Expected date: 11/10/2020  -     T4, Free; Future; Expected date: 11/10/2020  -     TSH; Future; Expected date: 11/10/2020    Class 3 severe obesity with body mass index (BMI) of 50.0 to 59.9 in adult, unspecified obesity type, unspecified whether  serious comorbidity present: Caloric restriction w regular exercise and weight reduction.  -     Comprehensive Metabolic Panel; Future; Expected date: 11/10/2020  -     Hemoglobin A1C; Future; Expected date: 11/10/2020  -     Lipid Panel; Future; Expected date: 11/10/2020    FMH of DM: Mom, sisters x2; MGF as well with diabetes.  Maternal grandfather actually lost his vision to diabetes .Exercise recommended with weight reduction and low carb diet; we'll follow hemoglobin A1c's with you periodically.    Gastroesophageal reflux disease, unspecified whether esophagitis present: No bedtime snacks; weight reduction. Use pantoprazole 40 mg a day as needed for reflux.   -     pantoprazole (PROTONIX) 40 MG tablet; 40 mg po daily as needed for reflux.  Dispense: 90 tablet; Refill: 1    Encounter for routine laboratory testing  -     CBC Auto Differential; Future; Expected date: 11/10/2020  -     Comprehensive Metabolic Panel; Future; Expected date: 11/10/2020  -     Hemoglobin A1C; Future; Expected date: 11/10/2020  -     Lipid Panel; Future; Expected date: 11/10/2020  -     T4, Free; Future; Expected date: 11/10/2020  -     TSH; Future; Expected date: 11/10/2020    Elevated blood pressure in the office without a diagnosis of hypertension: Maintain < 2 Gm Na a day diet, and monitor BP  keep a log.  Reassess on follow-up for hypertension diagnosis after watching her salt and exercising if unimproved

## 2020-12-30 ENCOUNTER — PATIENT OUTREACH (OUTPATIENT)
Dept: ADMINISTRATIVE | Facility: OTHER | Age: 60
End: 2020-12-30

## 2020-12-30 NOTE — PROGRESS NOTES
Health Maintenance Due   Topic Date Due    TETANUS VACCINE  01/11/1978    Shingles Vaccine (1 of 2) 01/11/2009     Updates were requested from care everywhere.  Chart was reviewed for overdue Proactive [facility] Encounters ([facility]) topics (CRS, Breast Cancer Screening, Eye exam)  Health Maintenance has been updated.  LINKS immunization registry triggered.  LINKS not responding.

## 2021-01-05 ENCOUNTER — OFFICE VISIT (OUTPATIENT)
Dept: OBSTETRICS AND GYNECOLOGY | Facility: CLINIC | Age: 61
End: 2021-01-05
Payer: COMMERCIAL

## 2021-01-05 ENCOUNTER — HOSPITAL ENCOUNTER (OUTPATIENT)
Dept: RADIOLOGY | Facility: HOSPITAL | Age: 61
Discharge: HOME | End: 2021-01-05
Attending: OBSTETRICS & GYNECOLOGY
Payer: COMMERCIAL

## 2021-01-05 VITALS
HEIGHT: 63 IN | RESPIRATION RATE: 18 BRPM | BODY MASS INDEX: 51.91 KG/M2 | DIASTOLIC BLOOD PRESSURE: 90 MMHG | WEIGHT: 293 LBS | SYSTOLIC BLOOD PRESSURE: 138 MMHG

## 2021-01-05 DIAGNOSIS — Z12.31 ENCOUNTER FOR SCREENING MAMMOGRAM FOR BREAST CANCER: ICD-10-CM

## 2021-01-05 DIAGNOSIS — Z90.710 S/P HYSTERECTOMY WITH OOPHORECTOMY: ICD-10-CM

## 2021-01-05 DIAGNOSIS — Z01.419 ROUTINE GYNECOLOGICAL EXAMINATION: Primary | ICD-10-CM

## 2021-01-05 DIAGNOSIS — Z90.721 S/P HYSTERECTOMY WITH OOPHORECTOMY: ICD-10-CM

## 2021-01-05 DIAGNOSIS — E66.01 CLASS 3 SEVERE OBESITY WITH BODY MASS INDEX (BMI) OF 50.0 TO 59.9 IN ADULT, UNSPECIFIED OBESITY TYPE, UNSPECIFIED WHETHER SERIOUS COMORBIDITY PRESENT: ICD-10-CM

## 2021-01-05 PROCEDURE — 77067 MAMMO DIGITAL SCREENING BILAT WITH TOMO: ICD-10-PCS | Mod: 26,,, | Performed by: RADIOLOGY

## 2021-01-05 PROCEDURE — 3008F BODY MASS INDEX DOCD: CPT | Mod: CPTII,S$GLB,, | Performed by: OBSTETRICS & GYNECOLOGY

## 2021-01-05 PROCEDURE — 77063 BREAST TOMOSYNTHESIS BI: CPT | Mod: 26,,, | Performed by: RADIOLOGY

## 2021-01-05 PROCEDURE — 1126F PR PAIN SEVERITY QUANTIFIED, NO PAIN PRESENT: ICD-10-PCS | Mod: S$GLB,,, | Performed by: OBSTETRICS & GYNECOLOGY

## 2021-01-05 PROCEDURE — 77063 MAMMO DIGITAL SCREENING BILAT WITH TOMO: ICD-10-PCS | Mod: 26,,, | Performed by: RADIOLOGY

## 2021-01-05 PROCEDURE — 99386 PREV VISIT NEW AGE 40-64: CPT | Mod: S$GLB,,, | Performed by: OBSTETRICS & GYNECOLOGY

## 2021-01-05 PROCEDURE — 99999 PR PBB SHADOW E&M-EST. PATIENT-LVL IV: CPT | Mod: PBBFAC,,, | Performed by: OBSTETRICS & GYNECOLOGY

## 2021-01-05 PROCEDURE — 99999 PR PBB SHADOW E&M-EST. PATIENT-LVL IV: ICD-10-PCS | Mod: PBBFAC,,, | Performed by: OBSTETRICS & GYNECOLOGY

## 2021-01-05 PROCEDURE — 77067 SCR MAMMO BI INCL CAD: CPT | Mod: TC,PN

## 2021-01-05 PROCEDURE — 3008F PR BODY MASS INDEX (BMI) DOCUMENTED: ICD-10-PCS | Mod: CPTII,S$GLB,, | Performed by: OBSTETRICS & GYNECOLOGY

## 2021-01-05 PROCEDURE — 1126F AMNT PAIN NOTED NONE PRSNT: CPT | Mod: S$GLB,,, | Performed by: OBSTETRICS & GYNECOLOGY

## 2021-01-05 PROCEDURE — 77067 SCR MAMMO BI INCL CAD: CPT | Mod: 26,,, | Performed by: RADIOLOGY

## 2021-01-05 PROCEDURE — 99386 PR PREVENTIVE VISIT,NEW,40-64: ICD-10-PCS | Mod: S$GLB,,, | Performed by: OBSTETRICS & GYNECOLOGY

## 2021-01-05 RX ORDER — CLOTRIMAZOLE AND BETAMETHASONE DIPROPIONATE 10; .64 MG/G; MG/G
CREAM TOPICAL
Qty: 15 G | Refills: 1 | Status: SHIPPED | OUTPATIENT
Start: 2021-01-05 | End: 2022-09-21

## 2021-01-19 ENCOUNTER — LAB VISIT (OUTPATIENT)
Dept: LAB | Facility: HOSPITAL | Age: 61
End: 2021-01-19
Attending: INTERNAL MEDICINE
Payer: COMMERCIAL

## 2021-01-19 DIAGNOSIS — E78.49 OTHER HYPERLIPIDEMIA: ICD-10-CM

## 2021-01-19 DIAGNOSIS — Z01.89 ENCOUNTER FOR ROUTINE LABORATORY TESTING: ICD-10-CM

## 2021-01-19 DIAGNOSIS — Z00.00 HEALTHCARE MAINTENANCE: ICD-10-CM

## 2021-01-19 DIAGNOSIS — E66.01 CLASS 3 SEVERE OBESITY WITH BODY MASS INDEX (BMI) OF 50.0 TO 59.9 IN ADULT, UNSPECIFIED OBESITY TYPE, UNSPECIFIED WHETHER SERIOUS COMORBIDITY PRESENT: ICD-10-CM

## 2021-01-19 LAB
ALBUMIN SERPL BCP-MCNC: 3.8 G/DL (ref 3.5–5.2)
ALP SERPL-CCNC: 65 U/L (ref 55–135)
ALT SERPL W/O P-5'-P-CCNC: 28 U/L (ref 10–44)
ANION GAP SERPL CALC-SCNC: 9 MMOL/L (ref 8–16)
AST SERPL-CCNC: 23 U/L (ref 10–40)
BASOPHILS # BLD AUTO: 0.03 K/UL (ref 0–0.2)
BASOPHILS NFR BLD: 0.5 % (ref 0–1.9)
BILIRUB SERPL-MCNC: 0.4 MG/DL (ref 0.1–1)
BUN SERPL-MCNC: 16 MG/DL (ref 8–23)
CALCIUM SERPL-MCNC: 9.1 MG/DL (ref 8.7–10.5)
CHLORIDE SERPL-SCNC: 107 MMOL/L (ref 95–110)
CHOLEST SERPL-MCNC: 199 MG/DL (ref 120–199)
CHOLEST/HDLC SERPL: 4.2 {RATIO} (ref 2–5)
CO2 SERPL-SCNC: 27 MMOL/L (ref 23–29)
CREAT SERPL-MCNC: 0.8 MG/DL (ref 0.5–1.4)
DIFFERENTIAL METHOD: ABNORMAL
EOSINOPHIL # BLD AUTO: 0.1 K/UL (ref 0–0.5)
EOSINOPHIL NFR BLD: 2.3 % (ref 0–8)
ERYTHROCYTE [DISTWIDTH] IN BLOOD BY AUTOMATED COUNT: 13.2 % (ref 11.5–14.5)
EST. GFR  (AFRICAN AMERICAN): >60 ML/MIN/1.73 M^2
EST. GFR  (NON AFRICAN AMERICAN): >60 ML/MIN/1.73 M^2
ESTIMATED AVG GLUCOSE: 108 MG/DL (ref 68–131)
GLUCOSE SERPL-MCNC: 85 MG/DL (ref 70–110)
HBA1C MFR BLD HPLC: 5.4 % (ref 4–5.6)
HCT VFR BLD AUTO: 45.7 % (ref 37–48.5)
HDLC SERPL-MCNC: 47 MG/DL (ref 40–75)
HDLC SERPL: 23.6 % (ref 20–50)
HGB BLD-MCNC: 13.6 G/DL (ref 12–16)
IMM GRANULOCYTES # BLD AUTO: 0.01 K/UL (ref 0–0.04)
IMM GRANULOCYTES NFR BLD AUTO: 0.2 % (ref 0–0.5)
LDLC SERPL CALC-MCNC: 134.2 MG/DL (ref 63–159)
LYMPHOCYTES # BLD AUTO: 2.2 K/UL (ref 1–4.8)
LYMPHOCYTES NFR BLD: 36.7 % (ref 18–48)
MCH RBC QN AUTO: 30.8 PG (ref 27–31)
MCHC RBC AUTO-ENTMCNC: 29.8 G/DL (ref 32–36)
MCV RBC AUTO: 104 FL (ref 82–98)
MONOCYTES # BLD AUTO: 0.5 K/UL (ref 0.3–1)
MONOCYTES NFR BLD: 7.4 % (ref 4–15)
NEUTROPHILS # BLD AUTO: 3.2 K/UL (ref 1.8–7.7)
NEUTROPHILS NFR BLD: 52.9 % (ref 38–73)
NONHDLC SERPL-MCNC: 152 MG/DL
NRBC BLD-RTO: 0 /100 WBC
PLATELET # BLD AUTO: 288 K/UL (ref 150–350)
PMV BLD AUTO: 10.1 FL (ref 9.2–12.9)
POTASSIUM SERPL-SCNC: 4.6 MMOL/L (ref 3.5–5.1)
PROT SERPL-MCNC: 6.8 G/DL (ref 6–8.4)
RBC # BLD AUTO: 4.41 M/UL (ref 4–5.4)
SODIUM SERPL-SCNC: 143 MMOL/L (ref 136–145)
T4 FREE SERPL-MCNC: 0.87 NG/DL (ref 0.71–1.51)
TRIGL SERPL-MCNC: 89 MG/DL (ref 30–150)
TSH SERPL DL<=0.005 MIU/L-ACNC: 2.3 UIU/ML (ref 0.4–4)
WBC # BLD AUTO: 6.07 K/UL (ref 3.9–12.7)

## 2021-01-19 PROCEDURE — 85025 COMPLETE CBC W/AUTO DIFF WBC: CPT

## 2021-01-19 PROCEDURE — 84439 ASSAY OF FREE THYROXINE: CPT

## 2021-01-19 PROCEDURE — 84443 ASSAY THYROID STIM HORMONE: CPT

## 2021-01-19 PROCEDURE — 80053 COMPREHEN METABOLIC PANEL: CPT

## 2021-01-19 PROCEDURE — 36415 COLL VENOUS BLD VENIPUNCTURE: CPT | Mod: PN

## 2021-01-19 PROCEDURE — 80061 LIPID PANEL: CPT

## 2021-01-19 PROCEDURE — 83036 HEMOGLOBIN GLYCOSYLATED A1C: CPT

## 2021-01-25 ENCOUNTER — OFFICE VISIT (OUTPATIENT)
Dept: FAMILY MEDICINE | Facility: CLINIC | Age: 61
End: 2021-01-25
Payer: COMMERCIAL

## 2021-01-25 VITALS
HEIGHT: 62 IN | WEIGHT: 293 LBS | SYSTOLIC BLOOD PRESSURE: 142 MMHG | TEMPERATURE: 97 F | BODY MASS INDEX: 53.92 KG/M2 | DIASTOLIC BLOOD PRESSURE: 90 MMHG | HEART RATE: 70 BPM

## 2021-01-25 DIAGNOSIS — I87.2 VENOUS INSUFFICIENCY: ICD-10-CM

## 2021-01-25 DIAGNOSIS — G47.33 OSA ON CPAP: ICD-10-CM

## 2021-01-25 DIAGNOSIS — Z00.00 HEALTHCARE MAINTENANCE: ICD-10-CM

## 2021-01-25 DIAGNOSIS — Z01.89 ENCOUNTER FOR LABORATORY TEST: ICD-10-CM

## 2021-01-25 DIAGNOSIS — R60.0 BILATERAL LOWER EXTREMITY EDEMA: ICD-10-CM

## 2021-01-25 DIAGNOSIS — I10 UNCONTROLLED HYPERTENSION: Primary | ICD-10-CM

## 2021-01-25 DIAGNOSIS — L98.9 SCALP LESION: ICD-10-CM

## 2021-01-25 DIAGNOSIS — E78.49 OTHER HYPERLIPIDEMIA: ICD-10-CM

## 2021-01-25 DIAGNOSIS — E78.5 DYSLIPIDEMIA: ICD-10-CM

## 2021-01-25 DIAGNOSIS — Z83.438 FAMILY HISTORY OF HYPERLIPIDEMIA: ICD-10-CM

## 2021-01-25 DIAGNOSIS — E66.01 MORBID OBESITY: ICD-10-CM

## 2021-01-25 DIAGNOSIS — D75.89 MACROCYTOSIS WITHOUT ANEMIA: ICD-10-CM

## 2021-01-25 DIAGNOSIS — Z82.49 FAMILY HISTORY OF HYPERTENSION: ICD-10-CM

## 2021-01-25 PROCEDURE — 3008F BODY MASS INDEX DOCD: CPT | Mod: CPTII,S$GLB,, | Performed by: INTERNAL MEDICINE

## 2021-01-25 PROCEDURE — 99999 PR PBB SHADOW E&M-EST. PATIENT-LVL IV: ICD-10-PCS | Mod: PBBFAC,,, | Performed by: INTERNAL MEDICINE

## 2021-01-25 PROCEDURE — 3074F PR MOST RECENT SYSTOLIC BLOOD PRESSURE < 130 MM HG: ICD-10-PCS | Mod: CPTII,S$GLB,, | Performed by: INTERNAL MEDICINE

## 2021-01-25 PROCEDURE — 3074F SYST BP LT 130 MM HG: CPT | Mod: CPTII,S$GLB,, | Performed by: INTERNAL MEDICINE

## 2021-01-25 PROCEDURE — 3080F PR MOST RECENT DIASTOLIC BLOOD PRESSURE >= 90 MM HG: ICD-10-PCS | Mod: CPTII,S$GLB,, | Performed by: INTERNAL MEDICINE

## 2021-01-25 PROCEDURE — 3080F DIAST BP >= 90 MM HG: CPT | Mod: CPTII,S$GLB,, | Performed by: INTERNAL MEDICINE

## 2021-01-25 PROCEDURE — 99214 PR OFFICE/OUTPT VISIT, EST, LEVL IV, 30-39 MIN: ICD-10-PCS | Mod: S$GLB,,, | Performed by: INTERNAL MEDICINE

## 2021-01-25 PROCEDURE — 3008F PR BODY MASS INDEX (BMI) DOCUMENTED: ICD-10-PCS | Mod: CPTII,S$GLB,, | Performed by: INTERNAL MEDICINE

## 2021-01-25 PROCEDURE — 99999 PR PBB SHADOW E&M-EST. PATIENT-LVL IV: CPT | Mod: PBBFAC,,, | Performed by: INTERNAL MEDICINE

## 2021-01-25 PROCEDURE — 99214 OFFICE O/P EST MOD 30 MIN: CPT | Mod: S$GLB,,, | Performed by: INTERNAL MEDICINE

## 2021-01-25 RX ORDER — ATORVASTATIN CALCIUM 10 MG/1
10 TABLET, FILM COATED ORAL DAILY
Qty: 30 TABLET | Refills: 3 | Status: SHIPPED | OUTPATIENT
Start: 2021-01-25 | End: 2021-04-14

## 2021-01-25 RX ORDER — TRIAMTERENE AND HYDROCHLOROTHIAZIDE 37.5; 25 MG/1; MG/1
1 CAPSULE ORAL EVERY MORNING
Qty: 30 CAPSULE | Refills: 2 | Status: SHIPPED | OUTPATIENT
Start: 2021-01-25 | End: 2021-04-01 | Stop reason: SDUPTHER

## 2021-02-24 ENCOUNTER — IMMUNIZATION (OUTPATIENT)
Dept: FAMILY MEDICINE | Facility: CLINIC | Age: 61
End: 2021-02-24
Payer: COMMERCIAL

## 2021-02-24 DIAGNOSIS — Z23 NEED FOR VACCINATION: Primary | ICD-10-CM

## 2021-02-24 PROCEDURE — 91300 COVID-19, MRNA, LNP-S, PF, 30 MCG/0.3 ML DOSE VACCINE: CPT | Mod: PBBFAC | Performed by: INTERNAL MEDICINE

## 2021-03-15 ENCOUNTER — PATIENT OUTREACH (OUTPATIENT)
Dept: ADMINISTRATIVE | Facility: OTHER | Age: 61
End: 2021-03-15

## 2021-03-17 ENCOUNTER — IMMUNIZATION (OUTPATIENT)
Dept: FAMILY MEDICINE | Facility: CLINIC | Age: 61
End: 2021-03-17
Payer: COMMERCIAL

## 2021-03-17 DIAGNOSIS — Z23 NEED FOR VACCINATION: Primary | ICD-10-CM

## 2021-03-17 PROCEDURE — 91300 COVID-19, MRNA, LNP-S, PF, 30 MCG/0.3 ML DOSE VACCINE: CPT | Mod: ,,, | Performed by: INTERNAL MEDICINE

## 2021-03-17 PROCEDURE — 0002A COVID-19, MRNA, LNP-S, PF, 30 MCG/0.3 ML DOSE VACCINE: ICD-10-PCS | Mod: CV19,,, | Performed by: INTERNAL MEDICINE

## 2021-03-17 PROCEDURE — 91300 COVID-19, MRNA, LNP-S, PF, 30 MCG/0.3 ML DOSE VACCINE: ICD-10-PCS | Mod: ,,, | Performed by: INTERNAL MEDICINE

## 2021-03-17 PROCEDURE — 0002A COVID-19, MRNA, LNP-S, PF, 30 MCG/0.3 ML DOSE VACCINE: CPT | Mod: CV19,,, | Performed by: INTERNAL MEDICINE

## 2021-03-18 ENCOUNTER — OFFICE VISIT (OUTPATIENT)
Dept: DERMATOLOGY | Facility: CLINIC | Age: 61
End: 2021-03-18
Payer: COMMERCIAL

## 2021-03-18 VITALS — HEIGHT: 62 IN | WEIGHT: 293 LBS | BODY MASS INDEX: 53.92 KG/M2 | RESPIRATION RATE: 18 BRPM

## 2021-03-18 DIAGNOSIS — Z80.8 FAMILY HISTORY OF MELANOMA: Primary | ICD-10-CM

## 2021-03-18 DIAGNOSIS — L73.8 SEBACEOUS HYPERPLASIA: ICD-10-CM

## 2021-03-18 DIAGNOSIS — L81.4 LENTIGINES: ICD-10-CM

## 2021-03-18 DIAGNOSIS — D22.9 MULTIPLE BENIGN NEVI: ICD-10-CM

## 2021-03-18 DIAGNOSIS — D18.01 ANGIOMA OF SKIN: ICD-10-CM

## 2021-03-18 DIAGNOSIS — D23.9 DERMATOFIBROMA: ICD-10-CM

## 2021-03-18 DIAGNOSIS — Z12.83 SCREENING EXAM FOR SKIN CANCER: ICD-10-CM

## 2021-03-18 DIAGNOSIS — L82.1 SEBORRHEIC KERATOSES: ICD-10-CM

## 2021-03-18 PROCEDURE — 3008F PR BODY MASS INDEX (BMI) DOCUMENTED: ICD-10-PCS | Mod: CPTII,S$GLB,, | Performed by: DERMATOLOGY

## 2021-03-18 PROCEDURE — 1126F PR PAIN SEVERITY QUANTIFIED, NO PAIN PRESENT: ICD-10-PCS | Mod: S$GLB,,, | Performed by: DERMATOLOGY

## 2021-03-18 PROCEDURE — 99203 PR OFFICE/OUTPT VISIT, NEW, LEVL III, 30-44 MIN: ICD-10-PCS | Mod: S$GLB,,, | Performed by: DERMATOLOGY

## 2021-03-18 PROCEDURE — 99999 PR PBB SHADOW E&M-EST. PATIENT-LVL III: CPT | Mod: PBBFAC,,, | Performed by: DERMATOLOGY

## 2021-03-18 PROCEDURE — 99999 PR PBB SHADOW E&M-EST. PATIENT-LVL III: ICD-10-PCS | Mod: PBBFAC,,, | Performed by: DERMATOLOGY

## 2021-03-18 PROCEDURE — 3008F BODY MASS INDEX DOCD: CPT | Mod: CPTII,S$GLB,, | Performed by: DERMATOLOGY

## 2021-03-18 PROCEDURE — 99203 OFFICE O/P NEW LOW 30 MIN: CPT | Mod: S$GLB,,, | Performed by: DERMATOLOGY

## 2021-03-18 PROCEDURE — 1126F AMNT PAIN NOTED NONE PRSNT: CPT | Mod: S$GLB,,, | Performed by: DERMATOLOGY

## 2021-03-25 ENCOUNTER — LAB VISIT (OUTPATIENT)
Dept: LAB | Facility: HOSPITAL | Age: 61
End: 2021-03-25
Attending: INTERNAL MEDICINE
Payer: COMMERCIAL

## 2021-03-25 DIAGNOSIS — E78.49 OTHER HYPERLIPIDEMIA: ICD-10-CM

## 2021-03-25 DIAGNOSIS — E78.5 DYSLIPIDEMIA: ICD-10-CM

## 2021-03-25 DIAGNOSIS — D75.89 MACROCYTOSIS WITHOUT ANEMIA: ICD-10-CM

## 2021-03-25 DIAGNOSIS — I10 UNCONTROLLED HYPERTENSION: ICD-10-CM

## 2021-03-25 LAB
ALBUMIN SERPL BCP-MCNC: 3.6 G/DL (ref 3.5–5.2)
ALP SERPL-CCNC: 63 U/L (ref 55–135)
ALT SERPL W/O P-5'-P-CCNC: 28 U/L (ref 10–44)
ANION GAP SERPL CALC-SCNC: 8 MMOL/L (ref 8–16)
AST SERPL-CCNC: 22 U/L (ref 10–40)
BILIRUB SERPL-MCNC: 0.5 MG/DL (ref 0.1–1)
BUN SERPL-MCNC: 12 MG/DL (ref 8–23)
CALCIUM SERPL-MCNC: 8.5 MG/DL (ref 8.7–10.5)
CHLORIDE SERPL-SCNC: 107 MMOL/L (ref 95–110)
CHOLEST SERPL-MCNC: 140 MG/DL (ref 120–199)
CHOLEST/HDLC SERPL: 2.9 {RATIO} (ref 2–5)
CO2 SERPL-SCNC: 29 MMOL/L (ref 23–29)
CREAT SERPL-MCNC: 0.8 MG/DL (ref 0.5–1.4)
EST. GFR  (AFRICAN AMERICAN): >60 ML/MIN/1.73 M^2
EST. GFR  (NON AFRICAN AMERICAN): >60 ML/MIN/1.73 M^2
FOLATE SERPL-MCNC: 12.1 NG/ML (ref 4–24)
GLUCOSE SERPL-MCNC: 95 MG/DL (ref 70–110)
HDLC SERPL-MCNC: 48 MG/DL (ref 40–75)
HDLC SERPL: 34.3 % (ref 20–50)
LDLC SERPL CALC-MCNC: 77.2 MG/DL (ref 63–159)
MAGNESIUM SERPL-MCNC: 2.3 MG/DL (ref 1.6–2.6)
NONHDLC SERPL-MCNC: 92 MG/DL
POTASSIUM SERPL-SCNC: 3.8 MMOL/L (ref 3.5–5.1)
PROT SERPL-MCNC: 6.6 G/DL (ref 6–8.4)
SODIUM SERPL-SCNC: 144 MMOL/L (ref 136–145)
TRIGL SERPL-MCNC: 74 MG/DL (ref 30–150)
VIT B12 SERPL-MCNC: 796 PG/ML (ref 210–950)

## 2021-03-25 PROCEDURE — 80061 LIPID PANEL: CPT | Performed by: INTERNAL MEDICINE

## 2021-03-25 PROCEDURE — 80053 COMPREHEN METABOLIC PANEL: CPT | Performed by: INTERNAL MEDICINE

## 2021-03-25 PROCEDURE — 82746 ASSAY OF FOLIC ACID SERUM: CPT | Performed by: INTERNAL MEDICINE

## 2021-03-25 PROCEDURE — 82607 VITAMIN B-12: CPT | Performed by: INTERNAL MEDICINE

## 2021-03-25 PROCEDURE — 36415 COLL VENOUS BLD VENIPUNCTURE: CPT | Mod: PN | Performed by: INTERNAL MEDICINE

## 2021-03-25 PROCEDURE — 83735 ASSAY OF MAGNESIUM: CPT | Performed by: INTERNAL MEDICINE

## 2021-04-01 ENCOUNTER — OFFICE VISIT (OUTPATIENT)
Dept: FAMILY MEDICINE | Facility: CLINIC | Age: 61
End: 2021-04-01
Payer: COMMERCIAL

## 2021-04-01 VITALS
DIASTOLIC BLOOD PRESSURE: 75 MMHG | SYSTOLIC BLOOD PRESSURE: 119 MMHG | BODY MASS INDEX: 53.92 KG/M2 | WEIGHT: 293 LBS | HEART RATE: 70 BPM | HEIGHT: 62 IN

## 2021-04-01 DIAGNOSIS — D75.89 MACROCYTOSIS WITHOUT ANEMIA: ICD-10-CM

## 2021-04-01 DIAGNOSIS — Z01.89 ENCOUNTER FOR LABORATORY TEST: ICD-10-CM

## 2021-04-01 DIAGNOSIS — Z78.0 POSTMENOPAUSAL: ICD-10-CM

## 2021-04-01 DIAGNOSIS — I10 ESSENTIAL HYPERTENSION: Primary | ICD-10-CM

## 2021-04-01 DIAGNOSIS — E78.49 OTHER HYPERLIPIDEMIA: ICD-10-CM

## 2021-04-01 DIAGNOSIS — E66.01 MORBID OBESITY WITH BMI OF 50.0-59.9, ADULT: ICD-10-CM

## 2021-04-01 DIAGNOSIS — Z13.820 OSTEOPOROSIS SCREENING: ICD-10-CM

## 2021-04-01 DIAGNOSIS — R60.0 BILATERAL LOWER EXTREMITY EDEMA: ICD-10-CM

## 2021-04-01 DIAGNOSIS — E83.51 HYPOCALCEMIA: ICD-10-CM

## 2021-04-01 DIAGNOSIS — G47.33 OSA ON CPAP: ICD-10-CM

## 2021-04-01 DIAGNOSIS — K21.9 GASTROESOPHAGEAL REFLUX DISEASE, UNSPECIFIED WHETHER ESOPHAGITIS PRESENT: ICD-10-CM

## 2021-04-01 DIAGNOSIS — Z83.3 FAMILY HISTORY OF DIABETES MELLITUS (DM): ICD-10-CM

## 2021-04-01 PROBLEM — R03.0 ELEVATED BLOOD PRESSURE READING IN OFFICE WITHOUT DIAGNOSIS OF HYPERTENSION: Status: RESOLVED | Noted: 2020-11-10 | Resolved: 2021-04-01

## 2021-04-01 PROCEDURE — 3008F BODY MASS INDEX DOCD: CPT | Mod: CPTII,S$GLB,, | Performed by: INTERNAL MEDICINE

## 2021-04-01 PROCEDURE — 99214 PR OFFICE/OUTPT VISIT, EST, LEVL IV, 30-39 MIN: ICD-10-PCS | Mod: S$GLB,,, | Performed by: INTERNAL MEDICINE

## 2021-04-01 PROCEDURE — 3078F DIAST BP <80 MM HG: CPT | Mod: CPTII,S$GLB,, | Performed by: INTERNAL MEDICINE

## 2021-04-01 PROCEDURE — 1126F PR PAIN SEVERITY QUANTIFIED, NO PAIN PRESENT: ICD-10-PCS | Mod: S$GLB,,, | Performed by: INTERNAL MEDICINE

## 2021-04-01 PROCEDURE — 99214 OFFICE O/P EST MOD 30 MIN: CPT | Mod: S$GLB,,, | Performed by: INTERNAL MEDICINE

## 2021-04-01 PROCEDURE — 3074F PR MOST RECENT SYSTOLIC BLOOD PRESSURE < 130 MM HG: ICD-10-PCS | Mod: CPTII,S$GLB,, | Performed by: INTERNAL MEDICINE

## 2021-04-01 PROCEDURE — 3078F PR MOST RECENT DIASTOLIC BLOOD PRESSURE < 80 MM HG: ICD-10-PCS | Mod: CPTII,S$GLB,, | Performed by: INTERNAL MEDICINE

## 2021-04-01 PROCEDURE — 99999 PR PBB SHADOW E&M-EST. PATIENT-LVL IV: ICD-10-PCS | Mod: PBBFAC,,, | Performed by: INTERNAL MEDICINE

## 2021-04-01 PROCEDURE — 3074F SYST BP LT 130 MM HG: CPT | Mod: CPTII,S$GLB,, | Performed by: INTERNAL MEDICINE

## 2021-04-01 PROCEDURE — 3008F PR BODY MASS INDEX (BMI) DOCUMENTED: ICD-10-PCS | Mod: CPTII,S$GLB,, | Performed by: INTERNAL MEDICINE

## 2021-04-01 PROCEDURE — 99999 PR PBB SHADOW E&M-EST. PATIENT-LVL IV: CPT | Mod: PBBFAC,,, | Performed by: INTERNAL MEDICINE

## 2021-04-01 PROCEDURE — 1126F AMNT PAIN NOTED NONE PRSNT: CPT | Mod: S$GLB,,, | Performed by: INTERNAL MEDICINE

## 2021-04-01 RX ORDER — TRIAMTERENE AND HYDROCHLOROTHIAZIDE 37.5; 25 MG/1; MG/1
1 CAPSULE ORAL EVERY MORNING
Qty: 90 CAPSULE | Refills: 1 | Status: SHIPPED | OUTPATIENT
Start: 2021-04-01 | End: 2021-10-01

## 2021-04-05 ENCOUNTER — HOSPITAL ENCOUNTER (OUTPATIENT)
Dept: RADIOLOGY | Facility: HOSPITAL | Age: 61
Discharge: HOME | End: 2021-04-05
Attending: INTERNAL MEDICINE
Payer: COMMERCIAL

## 2021-04-05 DIAGNOSIS — E83.51 HYPOCALCEMIA: ICD-10-CM

## 2021-04-05 DIAGNOSIS — Z78.0 POSTMENOPAUSAL: ICD-10-CM

## 2021-04-05 PROCEDURE — 77080 DXA BONE DENSITY AXIAL: CPT | Mod: 26,,, | Performed by: RADIOLOGY

## 2021-04-05 PROCEDURE — 77080 DXA BONE DENSITY AXIAL: CPT | Mod: TC,PO

## 2021-04-05 PROCEDURE — 77080 DEXA BONE DENSITY SPINE HIP: ICD-10-PCS | Mod: 26,,, | Performed by: RADIOLOGY

## 2021-07-01 ENCOUNTER — LAB VISIT (OUTPATIENT)
Dept: LAB | Facility: HOSPITAL | Age: 61
End: 2021-07-01
Attending: INTERNAL MEDICINE
Payer: COMMERCIAL

## 2021-07-01 DIAGNOSIS — D75.89 MACROCYTOSIS WITHOUT ANEMIA: ICD-10-CM

## 2021-07-01 DIAGNOSIS — E83.51 HYPOCALCEMIA: ICD-10-CM

## 2021-07-01 DIAGNOSIS — I10 ESSENTIAL HYPERTENSION: ICD-10-CM

## 2021-07-01 LAB
25(OH)D3+25(OH)D2 SERPL-MCNC: 32 NG/ML (ref 30–96)
ANION GAP SERPL CALC-SCNC: 9 MMOL/L (ref 8–16)
BASOPHILS # BLD AUTO: 0.04 K/UL (ref 0–0.2)
BASOPHILS NFR BLD: 0.6 % (ref 0–1.9)
BUN SERPL-MCNC: 15 MG/DL (ref 8–23)
CALCIUM SERPL-MCNC: 9.2 MG/DL (ref 8.7–10.5)
CALCIUM SERPL-MCNC: 9.2 MG/DL (ref 8.7–10.5)
CHLORIDE SERPL-SCNC: 104 MMOL/L (ref 95–110)
CO2 SERPL-SCNC: 28 MMOL/L (ref 23–29)
CREAT SERPL-MCNC: 0.9 MG/DL (ref 0.5–1.4)
DIFFERENTIAL METHOD: ABNORMAL
EOSINOPHIL # BLD AUTO: 0.2 K/UL (ref 0–0.5)
EOSINOPHIL NFR BLD: 2.3 % (ref 0–8)
ERYTHROCYTE [DISTWIDTH] IN BLOOD BY AUTOMATED COUNT: 13.2 % (ref 11.5–14.5)
EST. GFR  (AFRICAN AMERICAN): >60 ML/MIN/1.73 M^2
EST. GFR  (NON AFRICAN AMERICAN): >60 ML/MIN/1.73 M^2
GLUCOSE SERPL-MCNC: 85 MG/DL (ref 70–110)
HCT VFR BLD AUTO: 40.9 % (ref 37–48.5)
HGB BLD-MCNC: 12.9 G/DL (ref 12–16)
IMM GRANULOCYTES # BLD AUTO: 0.01 K/UL (ref 0–0.04)
IMM GRANULOCYTES NFR BLD AUTO: 0.2 % (ref 0–0.5)
LYMPHOCYTES # BLD AUTO: 2.3 K/UL (ref 1–4.8)
LYMPHOCYTES NFR BLD: 36.1 % (ref 18–48)
MAGNESIUM SERPL-MCNC: 2.2 MG/DL (ref 1.6–2.6)
MCH RBC QN AUTO: 31.9 PG (ref 27–31)
MCHC RBC AUTO-ENTMCNC: 31.5 G/DL (ref 32–36)
MCV RBC AUTO: 101 FL (ref 82–98)
MONOCYTES # BLD AUTO: 0.5 K/UL (ref 0.3–1)
MONOCYTES NFR BLD: 7 % (ref 4–15)
NEUTROPHILS # BLD AUTO: 3.5 K/UL (ref 1.8–7.7)
NEUTROPHILS NFR BLD: 53.8 % (ref 38–73)
NRBC BLD-RTO: 0 /100 WBC
PLATELET # BLD AUTO: 299 K/UL (ref 150–450)
PMV BLD AUTO: 10.1 FL (ref 9.2–12.9)
POTASSIUM SERPL-SCNC: 3.7 MMOL/L (ref 3.5–5.1)
RBC # BLD AUTO: 4.05 M/UL (ref 4–5.4)
SODIUM SERPL-SCNC: 141 MMOL/L (ref 136–145)
T3FREE SERPL-MCNC: 2.9 PG/ML (ref 2.3–4.2)
T4 FREE SERPL-MCNC: 0.87 NG/DL (ref 0.71–1.51)
TSH SERPL DL<=0.005 MIU/L-ACNC: 2.52 UIU/ML (ref 0.4–4)
WBC # BLD AUTO: 6.46 K/UL (ref 3.9–12.7)

## 2021-07-01 PROCEDURE — 82306 VITAMIN D 25 HYDROXY: CPT | Performed by: INTERNAL MEDICINE

## 2021-07-01 PROCEDURE — 80048 BASIC METABOLIC PNL TOTAL CA: CPT | Performed by: INTERNAL MEDICINE

## 2021-07-01 PROCEDURE — 83735 ASSAY OF MAGNESIUM: CPT | Performed by: INTERNAL MEDICINE

## 2021-07-01 PROCEDURE — 36415 COLL VENOUS BLD VENIPUNCTURE: CPT | Mod: PN | Performed by: INTERNAL MEDICINE

## 2021-07-01 PROCEDURE — 84439 ASSAY OF FREE THYROXINE: CPT | Performed by: INTERNAL MEDICINE

## 2021-07-01 PROCEDURE — 84481 FREE ASSAY (FT-3): CPT | Performed by: INTERNAL MEDICINE

## 2021-07-01 PROCEDURE — 85025 COMPLETE CBC W/AUTO DIFF WBC: CPT | Performed by: INTERNAL MEDICINE

## 2021-07-01 PROCEDURE — 84443 ASSAY THYROID STIM HORMONE: CPT | Performed by: INTERNAL MEDICINE

## 2021-07-29 ENCOUNTER — OFFICE VISIT (OUTPATIENT)
Dept: FAMILY MEDICINE | Facility: CLINIC | Age: 61
End: 2021-07-29
Payer: COMMERCIAL

## 2021-07-29 VITALS
HEART RATE: 96 BPM | WEIGHT: 293 LBS | BODY MASS INDEX: 53.92 KG/M2 | OXYGEN SATURATION: 94 % | DIASTOLIC BLOOD PRESSURE: 86 MMHG | SYSTOLIC BLOOD PRESSURE: 148 MMHG | HEIGHT: 62 IN

## 2021-07-29 DIAGNOSIS — R51.9 FRONTAL HEADACHE: ICD-10-CM

## 2021-07-29 DIAGNOSIS — R05.9 COUGH: ICD-10-CM

## 2021-07-29 DIAGNOSIS — Z11.52 ENCOUNTER FOR SCREENING FOR COVID-19: ICD-10-CM

## 2021-07-29 DIAGNOSIS — J20.9 ACUTE BRONCHITIS, UNSPECIFIED ORGANISM: ICD-10-CM

## 2021-07-29 DIAGNOSIS — B96.89 ACUTE BACTERIAL SINUSITIS: ICD-10-CM

## 2021-07-29 DIAGNOSIS — R50.9 FEBRILE ILLNESS: Primary | ICD-10-CM

## 2021-07-29 DIAGNOSIS — B96.89 ACUTE BACTERIAL PHARYNGITIS: ICD-10-CM

## 2021-07-29 DIAGNOSIS — R53.83 FATIGUE, UNSPECIFIED TYPE: ICD-10-CM

## 2021-07-29 DIAGNOSIS — R09.82 POST-NASAL DRIP: ICD-10-CM

## 2021-07-29 DIAGNOSIS — J01.90 ACUTE BACTERIAL SINUSITIS: ICD-10-CM

## 2021-07-29 DIAGNOSIS — R09.81 NASAL CONGESTION: ICD-10-CM

## 2021-07-29 DIAGNOSIS — J02.9 SORE THROAT: ICD-10-CM

## 2021-07-29 DIAGNOSIS — J02.8 ACUTE BACTERIAL PHARYNGITIS: ICD-10-CM

## 2021-07-29 PROCEDURE — 3008F PR BODY MASS INDEX (BMI) DOCUMENTED: ICD-10-PCS | Mod: CPTII,S$GLB,, | Performed by: INTERNAL MEDICINE

## 2021-07-29 PROCEDURE — 1159F PR MEDICATION LIST DOCUMENTED IN MEDICAL RECORD: ICD-10-PCS | Mod: CPTII,S$GLB,, | Performed by: INTERNAL MEDICINE

## 2021-07-29 PROCEDURE — 1160F PR REVIEW ALL MEDS BY PRESCRIBER/CLIN PHARMACIST DOCUMENTED: ICD-10-PCS | Mod: CPTII,S$GLB,, | Performed by: INTERNAL MEDICINE

## 2021-07-29 PROCEDURE — 3077F SYST BP >= 140 MM HG: CPT | Mod: CPTII,S$GLB,, | Performed by: INTERNAL MEDICINE

## 2021-07-29 PROCEDURE — 3008F BODY MASS INDEX DOCD: CPT | Mod: CPTII,S$GLB,, | Performed by: INTERNAL MEDICINE

## 2021-07-29 PROCEDURE — 1159F MED LIST DOCD IN RCRD: CPT | Mod: CPTII,S$GLB,, | Performed by: INTERNAL MEDICINE

## 2021-07-29 PROCEDURE — 99999 PR PBB SHADOW E&M-EST. PATIENT-LVL IV: CPT | Mod: PBBFAC,,, | Performed by: INTERNAL MEDICINE

## 2021-07-29 PROCEDURE — 3077F PR MOST RECENT SYSTOLIC BLOOD PRESSURE >= 140 MM HG: ICD-10-PCS | Mod: CPTII,S$GLB,, | Performed by: INTERNAL MEDICINE

## 2021-07-29 PROCEDURE — 99999 PR PBB SHADOW E&M-EST. PATIENT-LVL IV: ICD-10-PCS | Mod: PBBFAC,,, | Performed by: INTERNAL MEDICINE

## 2021-07-29 PROCEDURE — 3079F DIAST BP 80-89 MM HG: CPT | Mod: CPTII,S$GLB,, | Performed by: INTERNAL MEDICINE

## 2021-07-29 PROCEDURE — 99214 PR OFFICE/OUTPT VISIT, EST, LEVL IV, 30-39 MIN: ICD-10-PCS | Mod: S$GLB,,, | Performed by: INTERNAL MEDICINE

## 2021-07-29 PROCEDURE — 3044F HG A1C LEVEL LT 7.0%: CPT | Mod: CPTII,S$GLB,, | Performed by: INTERNAL MEDICINE

## 2021-07-29 PROCEDURE — 3044F PR MOST RECENT HEMOGLOBIN A1C LEVEL <7.0%: ICD-10-PCS | Mod: CPTII,S$GLB,, | Performed by: INTERNAL MEDICINE

## 2021-07-29 PROCEDURE — 1160F RVW MEDS BY RX/DR IN RCRD: CPT | Mod: CPTII,S$GLB,, | Performed by: INTERNAL MEDICINE

## 2021-07-29 PROCEDURE — 99214 OFFICE O/P EST MOD 30 MIN: CPT | Mod: S$GLB,,, | Performed by: INTERNAL MEDICINE

## 2021-07-29 PROCEDURE — 3079F PR MOST RECENT DIASTOLIC BLOOD PRESSURE 80-89 MM HG: ICD-10-PCS | Mod: CPTII,S$GLB,, | Performed by: INTERNAL MEDICINE

## 2021-07-29 RX ORDER — FLUTICASONE PROPIONATE 50 MCG
SPRAY, SUSPENSION (ML) NASAL
Qty: 16 G | Refills: 2 | Status: SHIPPED | OUTPATIENT
Start: 2021-07-29

## 2021-07-29 RX ORDER — LORATADINE 10 MG/1
10 TABLET ORAL DAILY PRN
Qty: 30 TABLET | Refills: 1 | Status: SHIPPED | OUTPATIENT
Start: 2021-07-29 | End: 2021-09-02

## 2021-07-29 RX ORDER — AZITHROMYCIN 250 MG/1
TABLET, FILM COATED ORAL
Qty: 2 TABLET | Refills: 0 | Status: SHIPPED | OUTPATIENT
Start: 2021-07-29 | End: 2021-09-20

## 2021-07-30 ENCOUNTER — LAB VISIT (OUTPATIENT)
Dept: FAMILY MEDICINE | Facility: CLINIC | Age: 61
End: 2021-07-30
Payer: COMMERCIAL

## 2021-07-30 ENCOUNTER — TELEPHONE (OUTPATIENT)
Dept: FAMILY MEDICINE | Facility: CLINIC | Age: 61
End: 2021-07-30

## 2021-07-30 DIAGNOSIS — R09.81 NASAL CONGESTION: ICD-10-CM

## 2021-07-30 DIAGNOSIS — R51.9 FRONTAL HEADACHE: ICD-10-CM

## 2021-07-30 DIAGNOSIS — J02.9 SORE THROAT: ICD-10-CM

## 2021-07-30 DIAGNOSIS — R50.9 FEBRILE ILLNESS: ICD-10-CM

## 2021-07-30 DIAGNOSIS — R09.82 POST-NASAL DRIP: ICD-10-CM

## 2021-07-30 PROCEDURE — U0003 INFECTIOUS AGENT DETECTION BY NUCLEIC ACID (DNA OR RNA); SEVERE ACUTE RESPIRATORY SYNDROME CORONAVIRUS 2 (SARS-COV-2) (CORONAVIRUS DISEASE [COVID-19]), AMPLIFIED PROBE TECHNIQUE, MAKING USE OF HIGH THROUGHPUT TECHNOLOGIES AS DESCRIBED BY CMS-2020-01-R: HCPCS | Performed by: INTERNAL MEDICINE

## 2021-07-30 PROCEDURE — U0005 INFEC AGEN DETEC AMPLI PROBE: HCPCS | Performed by: INTERNAL MEDICINE

## 2021-07-31 LAB
SARS-COV-2 RNA RESP QL NAA+PROBE: NOT DETECTED
SARS-COV-2- CYCLE NUMBER: -1

## 2021-09-20 ENCOUNTER — OFFICE VISIT (OUTPATIENT)
Dept: FAMILY MEDICINE | Facility: CLINIC | Age: 61
End: 2021-09-20
Payer: COMMERCIAL

## 2021-09-20 VITALS
HEART RATE: 93 BPM | BODY MASS INDEX: 53.92 KG/M2 | OXYGEN SATURATION: 99 % | SYSTOLIC BLOOD PRESSURE: 138 MMHG | DIASTOLIC BLOOD PRESSURE: 86 MMHG | HEIGHT: 62 IN | WEIGHT: 293 LBS

## 2021-09-20 DIAGNOSIS — R30.0 DYSURIA: Primary | ICD-10-CM

## 2021-09-20 LAB
BACTERIA #/AREA URNS HPF: ABNORMAL /HPF
BILIRUB UR QL STRIP: NEGATIVE
CLARITY UR: CLEAR
COLOR UR: YELLOW
GLUCOSE UR QL STRIP: NEGATIVE
HGB UR QL STRIP: NEGATIVE
KETONES UR QL STRIP: NEGATIVE
LEUKOCYTE ESTERASE UR QL STRIP: ABNORMAL
MICROSCOPIC COMMENT: ABNORMAL
NITRITE UR QL STRIP: NEGATIVE
NON-SQ EPI CELLS #/AREA URNS HPF: 2 /HPF
PH UR STRIP: 7 [PH] (ref 5–8)
PROT UR QL STRIP: NEGATIVE
SP GR UR STRIP: 1.02 (ref 1–1.03)
SQUAMOUS #/AREA URNS HPF: 5 /HPF
URN SPEC COLLECT METH UR: ABNORMAL
WBC #/AREA URNS HPF: 9 /HPF (ref 0–5)

## 2021-09-20 PROCEDURE — 99213 PR OFFICE/OUTPT VISIT, EST, LEVL III, 20-29 MIN: ICD-10-PCS | Mod: S$GLB,,, | Performed by: PHYSICIAN ASSISTANT

## 2021-09-20 PROCEDURE — 3044F PR MOST RECENT HEMOGLOBIN A1C LEVEL <7.0%: ICD-10-PCS | Mod: CPTII,S$GLB,, | Performed by: PHYSICIAN ASSISTANT

## 2021-09-20 PROCEDURE — 81000 URINALYSIS NONAUTO W/SCOPE: CPT | Mod: PO | Performed by: PHYSICIAN ASSISTANT

## 2021-09-20 PROCEDURE — 3075F PR MOST RECENT SYSTOLIC BLOOD PRESS GE 130-139MM HG: ICD-10-PCS | Mod: CPTII,S$GLB,, | Performed by: PHYSICIAN ASSISTANT

## 2021-09-20 PROCEDURE — 99999 PR PBB SHADOW E&M-EST. PATIENT-LVL IV: CPT | Mod: PBBFAC,,, | Performed by: PHYSICIAN ASSISTANT

## 2021-09-20 PROCEDURE — 3079F PR MOST RECENT DIASTOLIC BLOOD PRESSURE 80-89 MM HG: ICD-10-PCS | Mod: CPTII,S$GLB,, | Performed by: PHYSICIAN ASSISTANT

## 2021-09-20 PROCEDURE — 3008F PR BODY MASS INDEX (BMI) DOCUMENTED: ICD-10-PCS | Mod: CPTII,S$GLB,, | Performed by: PHYSICIAN ASSISTANT

## 2021-09-20 PROCEDURE — 99213 OFFICE O/P EST LOW 20 MIN: CPT | Mod: S$GLB,,, | Performed by: PHYSICIAN ASSISTANT

## 2021-09-20 PROCEDURE — 1159F PR MEDICATION LIST DOCUMENTED IN MEDICAL RECORD: ICD-10-PCS | Mod: CPTII,S$GLB,, | Performed by: PHYSICIAN ASSISTANT

## 2021-09-20 PROCEDURE — 3044F HG A1C LEVEL LT 7.0%: CPT | Mod: CPTII,S$GLB,, | Performed by: PHYSICIAN ASSISTANT

## 2021-09-20 PROCEDURE — 1159F MED LIST DOCD IN RCRD: CPT | Mod: CPTII,S$GLB,, | Performed by: PHYSICIAN ASSISTANT

## 2021-09-20 PROCEDURE — 3008F BODY MASS INDEX DOCD: CPT | Mod: CPTII,S$GLB,, | Performed by: PHYSICIAN ASSISTANT

## 2021-09-20 PROCEDURE — 3079F DIAST BP 80-89 MM HG: CPT | Mod: CPTII,S$GLB,, | Performed by: PHYSICIAN ASSISTANT

## 2021-09-20 PROCEDURE — 99999 PR PBB SHADOW E&M-EST. PATIENT-LVL IV: ICD-10-PCS | Mod: PBBFAC,,, | Performed by: PHYSICIAN ASSISTANT

## 2021-09-20 PROCEDURE — 3075F SYST BP GE 130 - 139MM HG: CPT | Mod: CPTII,S$GLB,, | Performed by: PHYSICIAN ASSISTANT

## 2021-09-20 RX ORDER — DOXYCYCLINE 100 MG/1
100 CAPSULE ORAL 2 TIMES DAILY
Qty: 20 CAPSULE | Refills: 0 | Status: SHIPPED | OUTPATIENT
Start: 2021-09-20 | End: 2021-09-30

## 2021-09-30 DIAGNOSIS — I10 ESSENTIAL HYPERTENSION: ICD-10-CM

## 2021-10-01 RX ORDER — TRIAMTERENE AND HYDROCHLOROTHIAZIDE 37.5; 25 MG/1; MG/1
CAPSULE ORAL
Qty: 90 CAPSULE | Refills: 1 | Status: SHIPPED | OUTPATIENT
Start: 2021-10-01 | End: 2022-03-22

## 2021-10-03 PROBLEM — Z01.89 ENCOUNTER FOR ROUTINE LABORATORY TESTING: Status: RESOLVED | Noted: 2020-08-09 | Resolved: 2021-10-03

## 2021-10-03 PROBLEM — Z01.89 ENCOUNTER FOR LABORATORY TEST: Status: RESOLVED | Noted: 2021-01-25 | Resolved: 2021-10-03

## 2021-10-03 PROBLEM — Z12.11 SCREEN FOR COLON CANCER: Status: RESOLVED | Noted: 2020-09-15 | Resolved: 2021-10-03

## 2021-11-10 DIAGNOSIS — E78.49 OTHER HYPERLIPIDEMIA: ICD-10-CM

## 2021-11-10 DIAGNOSIS — E78.5 DYSLIPIDEMIA: ICD-10-CM

## 2021-11-10 DIAGNOSIS — Z83.438 FAMILY HISTORY OF HYPERLIPIDEMIA: ICD-10-CM

## 2021-11-12 RX ORDER — ATORVASTATIN CALCIUM 10 MG/1
TABLET, FILM COATED ORAL
Qty: 90 TABLET | Refills: 1 | Status: SHIPPED | OUTPATIENT
Start: 2021-11-12 | End: 2022-01-12

## 2021-12-06 ENCOUNTER — TELEPHONE (OUTPATIENT)
Dept: OBSTETRICS AND GYNECOLOGY | Facility: CLINIC | Age: 61
End: 2021-12-06
Payer: COMMERCIAL

## 2021-12-06 DIAGNOSIS — Z12.31 VISIT FOR SCREENING MAMMOGRAM: ICD-10-CM

## 2021-12-06 DIAGNOSIS — Z92.89 HISTORY OF SCREENING MAMMOGRAPHY: Primary | ICD-10-CM

## 2021-12-30 ENCOUNTER — TELEPHONE (OUTPATIENT)
Dept: FAMILY MEDICINE | Facility: CLINIC | Age: 61
End: 2021-12-30
Payer: COMMERCIAL

## 2022-01-06 ENCOUNTER — HOSPITAL ENCOUNTER (OUTPATIENT)
Dept: RADIOLOGY | Facility: HOSPITAL | Age: 62
Discharge: HOME | End: 2022-01-06
Attending: OBSTETRICS & GYNECOLOGY
Payer: COMMERCIAL

## 2022-01-06 DIAGNOSIS — Z12.31 VISIT FOR SCREENING MAMMOGRAM: ICD-10-CM

## 2022-01-06 PROCEDURE — 77067 SCR MAMMO BI INCL CAD: CPT | Mod: 26,,, | Performed by: RADIOLOGY

## 2022-01-06 PROCEDURE — 77063 MAMMO DIGITAL SCREENING BILAT WITH TOMO: ICD-10-PCS | Mod: 26,,, | Performed by: RADIOLOGY

## 2022-01-06 PROCEDURE — 77067 MAMMO DIGITAL SCREENING BILAT WITH TOMO: ICD-10-PCS | Mod: 26,,, | Performed by: RADIOLOGY

## 2022-01-06 PROCEDURE — 77063 BREAST TOMOSYNTHESIS BI: CPT | Mod: 26,,, | Performed by: RADIOLOGY

## 2022-01-06 PROCEDURE — 77063 BREAST TOMOSYNTHESIS BI: CPT | Mod: TC,PO

## 2022-01-06 PROCEDURE — 77067 SCR MAMMO BI INCL CAD: CPT | Mod: TC,PO

## 2022-01-07 ENCOUNTER — TELEPHONE (OUTPATIENT)
Dept: FAMILY MEDICINE | Facility: CLINIC | Age: 62
End: 2022-01-07
Payer: COMMERCIAL

## 2022-01-07 NOTE — TELEPHONE ENCOUNTER
----- Message from Jaqui Frazier sent at 1/7/2022  9:36 AM CST -----  Contact: pt at [phone]  Type: Needs Medical Advice  Who Called:  pt  Best Call Back Number: [phone]  Additional Information: pt is calling the office in regards to having been told to call and speak with the nurse about the medication she is on and how she is doing. Please call back and advise.

## 2022-01-12 ENCOUNTER — OFFICE VISIT (OUTPATIENT)
Dept: FAMILY MEDICINE | Facility: CLINIC | Age: 62
End: 2022-01-12
Payer: COMMERCIAL

## 2022-01-12 DIAGNOSIS — K21.9 GASTROESOPHAGEAL REFLUX DISEASE, UNSPECIFIED WHETHER ESOPHAGITIS PRESENT: ICD-10-CM

## 2022-01-12 DIAGNOSIS — R10.2 SUPRAPUBIC PRESSURE: ICD-10-CM

## 2022-01-12 DIAGNOSIS — R30.0 DYSURIA: ICD-10-CM

## 2022-01-12 DIAGNOSIS — K59.00 CONSTIPATION, UNSPECIFIED CONSTIPATION TYPE: ICD-10-CM

## 2022-01-12 DIAGNOSIS — E78.5 DYSLIPIDEMIA: ICD-10-CM

## 2022-01-12 DIAGNOSIS — E78.49 OTHER HYPERLIPIDEMIA: ICD-10-CM

## 2022-01-12 DIAGNOSIS — E66.01 MORBID OBESITY: ICD-10-CM

## 2022-01-12 DIAGNOSIS — I10 ESSENTIAL HYPERTENSION: Primary | ICD-10-CM

## 2022-01-12 DIAGNOSIS — M79.10 MYALGIA: ICD-10-CM

## 2022-01-12 DIAGNOSIS — G47.33 OSA ON CPAP: ICD-10-CM

## 2022-01-12 DIAGNOSIS — R60.0 BILATERAL LOWER EXTREMITY EDEMA: ICD-10-CM

## 2022-01-12 PROCEDURE — 99215 OFFICE O/P EST HI 40 MIN: CPT | Mod: S$GLB,,, | Performed by: INTERNAL MEDICINE

## 2022-01-12 PROCEDURE — 3075F PR MOST RECENT SYSTOLIC BLOOD PRESS GE 130-139MM HG: ICD-10-PCS | Mod: CPTII,S$GLB,, | Performed by: INTERNAL MEDICINE

## 2022-01-12 PROCEDURE — 99999 PR PBB SHADOW E&M-EST. PATIENT-LVL IV: CPT | Mod: PBBFAC,,, | Performed by: INTERNAL MEDICINE

## 2022-01-12 PROCEDURE — 3008F PR BODY MASS INDEX (BMI) DOCUMENTED: ICD-10-PCS | Mod: CPTII,S$GLB,, | Performed by: INTERNAL MEDICINE

## 2022-01-12 PROCEDURE — 3008F BODY MASS INDEX DOCD: CPT | Mod: CPTII,S$GLB,, | Performed by: INTERNAL MEDICINE

## 2022-01-12 PROCEDURE — 1160F PR REVIEW ALL MEDS BY PRESCRIBER/CLIN PHARMACIST DOCUMENTED: ICD-10-PCS | Mod: CPTII,S$GLB,, | Performed by: INTERNAL MEDICINE

## 2022-01-12 PROCEDURE — 3079F PR MOST RECENT DIASTOLIC BLOOD PRESSURE 80-89 MM HG: ICD-10-PCS | Mod: CPTII,S$GLB,, | Performed by: INTERNAL MEDICINE

## 2022-01-12 PROCEDURE — 1160F RVW MEDS BY RX/DR IN RCRD: CPT | Mod: CPTII,S$GLB,, | Performed by: INTERNAL MEDICINE

## 2022-01-12 PROCEDURE — 99999 PR PBB SHADOW E&M-EST. PATIENT-LVL IV: ICD-10-PCS | Mod: PBBFAC,,, | Performed by: INTERNAL MEDICINE

## 2022-01-12 PROCEDURE — 3075F SYST BP GE 130 - 139MM HG: CPT | Mod: CPTII,S$GLB,, | Performed by: INTERNAL MEDICINE

## 2022-01-12 PROCEDURE — 3079F DIAST BP 80-89 MM HG: CPT | Mod: CPTII,S$GLB,, | Performed by: INTERNAL MEDICINE

## 2022-01-12 PROCEDURE — 1159F PR MEDICATION LIST DOCUMENTED IN MEDICAL RECORD: ICD-10-PCS | Mod: CPTII,S$GLB,, | Performed by: INTERNAL MEDICINE

## 2022-01-12 PROCEDURE — 99215 PR OFFICE/OUTPT VISIT, EST, LEVL V, 40-54 MIN: ICD-10-PCS | Mod: S$GLB,,, | Performed by: INTERNAL MEDICINE

## 2022-01-12 PROCEDURE — 1159F MED LIST DOCD IN RCRD: CPT | Mod: CPTII,S$GLB,, | Performed by: INTERNAL MEDICINE

## 2022-01-12 RX ORDER — PRAVASTATIN SODIUM 40 MG/1
40 TABLET ORAL NIGHTLY
Qty: 30 TABLET | Refills: 3 | Status: SHIPPED | OUTPATIENT
Start: 2022-01-12 | End: 2022-03-25

## 2022-01-12 NOTE — PATIENT INSTRUCTIONS
Essential hypertension; Maintain < 2 Gm Na a day diet, and monitor BP at home; keep a log.    JUANCHO on CPAP; Uses CPAP nightly and helps.  Weight reduction where indicated.    Other hyperlipidemia; Maintain low fat high fiber diet, exercise regularly. Weight reduction where indicated. Stop atorvastatin due to myalgia.   -     pravastatin (PRAVACHOL) 40 MG tablet; Take 1 tablet (40 mg total) by mouth every evening.  Dispense: 30 tablet; Refill: 3    Dyslipidemia; Maintain low fat high fiber diet, exercise regularly. Weight reduction where indicated. Increase aerobic activity  -     pravastatin (PRAVACHOL) 40 MG tablet; Take 1 tablet (40 mg total) by mouth every evening.  Dispense: 30 tablet; Refill: 3    Morbid obesity; Caloric restriction w regular exercise and weight reduction.    Gastroesophageal reflux disease, unspecified whether esophagitis present; No bedtime snacks; weight reduction. On pantoprazole 40 mg a day as needed.     Bilateral lower extremity edema; Maintain less than 2 g sodium diet; elevate lower extremities at home; use compression stockings if possible.    Myalgia; use Qunol 100 mg a day if still an issue.   -     pravastatin (PRAVACHOL) 40 MG tablet; Take 1 tablet (40 mg total) by mouth every evening.  Dispense: 30 tablet; Refill: 3    Constipation, unspecified constipation type; keep well hydrayed; docusate sodium 1-2  Times a day; metamucil daily; miralax 17 g a day as needed for relief.     Suprapubic pressure; intermittent x4 episodes between Aug-Dec; only 1 cult w UTI. Pt instructed on proper hygiene which will help as well. Mangement of constipation will also help. See above.  Consult to Dr Zamora;  Pt w recurrent UTI sx's 4x in Aug-Dec but only one culture available. Also only one set UA results. Please evaluate and treat. Pt instructed on proper hygiene as well which may be contributing to sx's and constipation. Please evlauate and treat. Push fluids more; no caffeine.     Dysuria; as  above only on set UA dip/micro in lab from [facility] w results.

## 2022-01-12 NOTE — PROGRESS NOTES
Subjective:       Patient ID: Concepcion Oneill is a 62 y.o. female.    Chief Complaint: Referral (Reoccuring UTI)    HPI:  Patient here today for reassessment and she would like a referral to Urology for reoccurring UTI  Essential hypertension; Maintain < 2 Gm Na a day diet, and monitor BP at home; keep a log.  Average at home 139/80; 148/88 manually here today earlier  JUANCHO on CPAP; Uses CPAP nightly and helps.  Weight reduction will also help along with regular exercise as tolerated  Other hyperlipidemia; Maintain low fat high fiber diet, exercise regularly. Weight reduction will help.. Stop atorvastatin due to myalgia.  In September saw Jennifer Alberto nurse practitioner.  Recommended that she take Co Q10 supplement with Lipitor due to her myalgias.  Can change Lipitor to half a dose every other day if unimproved.  Continue Co Q10 as Qunol 100 mg daily daily. Replace atorvastatin with pravastatin 40 mg per HS  -     pravastatin (PRAVACHOL) 40 MG tablet; Take 1 tablet (40 mg total) by mouth every evening.  Dispense: 30 tablet; Refill: 3  Dyslipidemia; Maintain low fat high fiber diet, exercise regularly. Weight reduction where indicated. Increase aerobic activity  -     pravastatin (PRAVACHOL) 40 MG tablet; Take 1 tablet (40 mg total) by mouth every evening.  Dispense: 30 tablet; Refill: 3  Myalgia; use Qunol 100 mg a day if still an issue.  Stop atorvastatin altogether due to myalgias  -     pravastatin (PRAVACHOL) 40 MG tablet; Take 1 tablet (40 mg total) by mouth every evening.  Dispense: 30 tablet; Refill: 3  Morbid obesity; Caloric restriction w regular exercise and weight reduction.  Body mass index 53.21; low-fat low-salt diet along with portion restriction and regular exercise will all help her weight come down  Gastroesophageal reflux disease, unspecified whether esophagitis present; No bedtime snacks; weight reduction. On pantoprazole 40 mg a day as needed.   Bilateral lower extremity edema; Maintain less  than 2 g sodium diet; elevate lower extremities at home; use compression stockings if possible.  Constipation, unspecified constipation type; keep well hydrayed; docusate sodium 1-2  Times a day; metamucil daily; miralax 17 g a day as needed for relief.  BMs daily but at times hard passage;  Suprapubic pressure; intermittent x4 episodes between Aug-Dec; only 1 cult w UTI. Pt instructed on proper hygiene which will help as well. Mangement of constipation will also help. See above.  Consult to  Dr Zamora;  Pt w recurrent UTI sx's 4x in Aug-Dec but only one culture available. Also only one set UA results. Please evaluate and treat. Pt instructed on proper hygiene as well which may be contributing to sx's and constipation. Please evaluate and treat. Push fluids more; no caffeine.   Dysuria; as above only one set UA dip/micro in lab from epic w results.   Total time 3:45 a.m. through 4:37 p.m..  Greater than 50% of time spent in discussion, counseling, and review.  Various different diagnosis is discussed in covered and including plan of care.  Medications reviewed addressed and prescribed.  Labs reviewed with patient and ordered for follow-up.   consult placed to Dr. Zamora.    Review of Systems   Constitutional: Negative for appetite change and fever.   HENT: Negative for congestion, postnasal drip, rhinorrhea and sinus pressure.    Eyes: Negative for discharge and itching.   Respiratory: Negative for cough, chest tightness, shortness of breath and wheezing.    Cardiovascular: Positive for leg swelling. Negative for chest pain and palpitations.        Has been stable.   Gastrointestinal: Negative for abdominal distention, abdominal pain, blood in stool, constipation, diarrhea, nausea and vomiting.   Endocrine: Negative for polydipsia, polyphagia and polyuria.   Genitourinary: Negative for dysuria and hematuria.   Musculoskeletal: Negative for arthralgias and myalgias.   Skin: Negative for rash.  "  Allergic/Immunologic: Negative for environmental allergies and food allergies.   Neurological: Negative for tremors, seizures and syncope.   Hematological: Negative for adenopathy. Does not bruise/bleed easily.   Psychiatric/Behavioral: Negative for dysphoric mood. The patient is not nervous/anxious.       Objective:        Vitals:    22 1457 22 1550   BP: (!) 148/88 139/80  Comment: Average at home   BP Location: Right arm    Patient Position: Sitting    BP Method: Large (Manual)    Pulse: 81    Resp: 20    Weight: (!) 136.3 kg (300 lb 6 oz)    Height: 5' 3" (1.6 m)        BMI Readings from Last 3 Encounters:   22 53.21 kg/m²   21 55.08 kg/m²   21 54.68 kg/m²        Wt Readings from Last 3 Encounters:   22 1457 (!) 136.3 kg (300 lb 6 oz)   21 1420 (!) 136.6 kg (301 lb 2.4 oz)   21 1754 135.6 kg (298 lb 15.1 oz)        BP Readings from Last 3 Encounters:   22 139/80   21 138/86   21 (!) 148/86        There are no preventive care reminders to display for this patient.     Health Maintenance Due   Topic Date Due    Hepatitis C Screening  Never done    HIV Screening  Never done    TETANUS VACCINE  Never done    Shingles Vaccine (1 of 2) Never done    COVID-19 Vaccine (3 - Booster for Pfizer series) 2021         Past Medical History:   Diagnosis Date    Arthritis     Bronchitis in child     Obesity     Sleep apnea     uses cpap       Past Surgical History:   Procedure Laterality Date     SECTION      LGA; shoulders too big.     COLONOSCOPY      COLONOSCOPY N/A 9/15/2020    Procedure: COLONOSCOPY;  Surgeon: Phan Chakraborty MD;  Location: [facility];  Service: Endoscopy;  Laterality: N/A;    HYSTERECTOMY      w BSO; pregnant in tube; then  when pregnant in ovary later had hysterectomy.        Social History     Tobacco Use    Smoking status: Never Smoker    Smokeless tobacco: Never Used   Substance Use Topics "    Alcohol use: Not Currently     Comment: Just for Holidays    Drug use: Never       Family History   Problem Relation Age of Onset    Diabetes Sister     Heart disease Maternal Aunt     Breast cancer Maternal Aunt     Diabetes Sister     Cancer Other     Cancer Paternal Uncle         colon cancer       Review of patient's allergies indicates:   Allergen Reactions    Nitrofurantoin      Sec dose led to cramps and nausea so was discontinued    Atorvastatin      Led to myalgias so was discontinued; not benefited much by Co Q10 supplementation       Current Outpatient Medications on File Prior to Visit   Medication Sig Dispense Refill    ascorbic acid, vitamin C, (VITAMIN C) 1000 MG tablet Take 1,000 mg by mouth once daily.      fluticasone propionate (FLONASE) 50 mcg/actuation nasal spray 1-2 sprays a day for congestion 16 g 2    glucosamine HCl/chondroitin ambrose (GLUCOSAMINE-CHONDROITIN ORAL) Take by mouth 2 (two) times daily as needed.       loratadine (CLARITIN) 10 mg tablet TAKE 1 TABLET (10 MG TOTAL) BY MOUTH DAILY AS NEEDED FOR ALLERGIES (CONGESTION). 90 tablet 1    multivitamin with minerals tablet Take 1 tablet by mouth once daily.      pantoprazole (PROTONIX) 40 MG tablet 40 mg po daily as needed for reflux. (Patient taking differently: Take 40 mg by mouth daily as needed. for reflux.) 90 tablet 1    triamterene-hydrochlorothiazide 37.5-25 mg (DYAZIDE) 37.5-25 mg per capsule TAKE 1 CAPSULE BY MOUTH EVERY DAY IN THE MORNING 90 capsule 1     No current facility-administered medications on file prior to visit.       Physical Exam  Vitals reviewed.   Constitutional:       Appearance: She is well-developed and well-nourished.   HENT:      Head: Normocephalic and atraumatic.   Eyes:      Extraocular Movements: EOM normal.   Neck:      Thyroid: No thyromegaly.   Cardiovascular:      Rate and Rhythm: Normal rate and regular rhythm.      Heart sounds: Normal heart sounds. No murmur heard.  No gallop.     Pulmonary:      Effort: Pulmonary effort is normal. No respiratory distress.      Breath sounds: Normal breath sounds. No wheezing or rales.   Abdominal:      General: Bowel sounds are normal. There is no distension.      Palpations: Abdomen is soft.      Tenderness: There is no abdominal tenderness. There is no guarding or rebound.   Musculoskeletal:         General: Normal range of motion.      Cervical back: Normal range of motion and neck supple.      Right lower leg: Edema present.      Left lower leg: Edema present.      Comments: 3+ LE edema w L>R; no calf tenderness to palp.    Lymphadenopathy:      Cervical: No cervical adenopathy.   Skin:     Findings: No rash.   Neurological:      Mental Status: She is alert and oriented to person, place, and time.      Comments: Moves all 4 extremities fine.   Psychiatric:         Mood and Affect: Mood and affect normal.         Behavior: Behavior normal.         Thought Content: Thought content normal.         Assessment:       1. Essential hypertension    2. JUANCHO on CPAP    3. Other hyperlipidemia    4. Dyslipidemia    5. Myalgia    6. Morbid obesity    7. Gastroesophageal reflux disease, unspecified whether esophagitis present    8. Bilateral lower extremity edema    9. Constipation, unspecified constipation type    10. Suprapubic pressure    11. Dysuria        Plan:       Essential hypertension; Maintain < 2 Gm Na a day diet, and monitor BP at home; keep a log.    JUANCHO on CPAP; Uses CPAP nightly and helps.  Weight reduction where indicated.    Other hyperlipidemia; Maintain low fat high fiber diet, exercise regularly. Weight reduction where indicated. Stop atorvastatin due to myalgia.   -     pravastatin (PRAVACHOL) 40 MG tablet; Take 1 tablet (40 mg total) by mouth every evening.  Dispense: 30 tablet; Refill: 3    Dyslipidemia; Maintain low fat high fiber diet, exercise regularly. Weight reduction where indicated. Increase aerobic activity  -     pravastatin  (PRAVACHOL) 40 MG tablet; Take 1 tablet (40 mg total) by mouth every evening.  Dispense: 30 tablet; Refill: 3    Myalgia; use Qunol 100 mg a day if still an issue.  Keep well hydrated during the day.  -     pravastatin (PRAVACHOL) 40 MG tablet; Take 1 tablet (40 mg total) by mouth every evening.  Dispense: 30 tablet; Refill: 3    Morbid obesity; Caloric restriction w regular exercise and weight reduction.    Gastroesophageal reflux disease, unspecified whether esophagitis present; No bedtime snacks; weight reduction. On pantoprazole 40 mg a day as needed.     Bilateral lower extremity edema; Maintain less than 2 g sodium diet; elevate lower extremities at home; use compression stockings if possible.    Constipation, unspecified constipation type; keep well hydrayed; docusate sodium 1-2  Times a day; metamucil daily; miralax 17 g a day as needed for relief.     Suprapubic pressure; intermittent x4 episodes between Aug-Dec; only 1 cult w UTI. Pt instructed on proper hygiene which will help as well. Mangement of constipation will also help. See above.  Consult to Dr Zamora;  Pt w recurrent UTI sx's 4x in Aug-Dec but only one culture available. Also only one set UA results. Please evaluate and treat. Pt instructed on proper hygiene as well which may be contributing to sx's and constipation. Please evlauate and treat. Push fluids more; no caffeine.     Dysuria; as above only on set UA dip/micro in lab from epic w results.

## 2022-01-19 ENCOUNTER — LAB VISIT (OUTPATIENT)
Dept: LAB | Facility: HOSPITAL | Age: 62
End: 2022-01-19
Attending: INTERNAL MEDICINE
Payer: COMMERCIAL

## 2022-01-19 DIAGNOSIS — E78.49 OTHER HYPERLIPIDEMIA: ICD-10-CM

## 2022-01-19 DIAGNOSIS — I10 ESSENTIAL HYPERTENSION: ICD-10-CM

## 2022-01-19 DIAGNOSIS — E78.5 DYSLIPIDEMIA: ICD-10-CM

## 2022-01-19 LAB
ALBUMIN SERPL BCP-MCNC: 3.6 G/DL (ref 3.5–5.2)
ALP SERPL-CCNC: 56 U/L (ref 55–135)
ALT SERPL W/O P-5'-P-CCNC: 27 U/L (ref 10–44)
ANION GAP SERPL CALC-SCNC: 11 MMOL/L (ref 8–16)
AST SERPL-CCNC: 23 U/L (ref 10–40)
BILIRUB SERPL-MCNC: 0.6 MG/DL (ref 0.1–1)
BUN SERPL-MCNC: 15 MG/DL (ref 8–23)
CALCIUM SERPL-MCNC: 9.4 MG/DL (ref 8.7–10.5)
CHLORIDE SERPL-SCNC: 105 MMOL/L (ref 95–110)
CHOLEST SERPL-MCNC: 175 MG/DL (ref 120–199)
CHOLEST/HDLC SERPL: 3.6 {RATIO} (ref 2–5)
CK SERPL-CCNC: 210 U/L (ref 20–180)
CO2 SERPL-SCNC: 26 MMOL/L (ref 23–29)
CREAT SERPL-MCNC: 0.8 MG/DL (ref 0.5–1.4)
EST. GFR  (AFRICAN AMERICAN): >60 ML/MIN/1.73 M^2
EST. GFR  (NON AFRICAN AMERICAN): >60 ML/MIN/1.73 M^2
GLUCOSE SERPL-MCNC: 94 MG/DL (ref 70–110)
HDLC SERPL-MCNC: 49 MG/DL (ref 40–75)
HDLC SERPL: 28 % (ref 20–50)
LDLC SERPL CALC-MCNC: 105.2 MG/DL (ref 63–159)
NONHDLC SERPL-MCNC: 126 MG/DL
POTASSIUM SERPL-SCNC: 4.1 MMOL/L (ref 3.5–5.1)
PROT SERPL-MCNC: 6.7 G/DL (ref 6–8.4)
SODIUM SERPL-SCNC: 142 MMOL/L (ref 136–145)
TRIGL SERPL-MCNC: 104 MG/DL (ref 30–150)

## 2022-01-19 PROCEDURE — 80053 COMPREHEN METABOLIC PANEL: CPT | Performed by: INTERNAL MEDICINE

## 2022-01-19 PROCEDURE — 82550 ASSAY OF CK (CPK): CPT | Performed by: INTERNAL MEDICINE

## 2022-01-19 PROCEDURE — 80061 LIPID PANEL: CPT | Performed by: INTERNAL MEDICINE

## 2022-01-19 PROCEDURE — 36415 COLL VENOUS BLD VENIPUNCTURE: CPT | Mod: PN | Performed by: INTERNAL MEDICINE

## 2022-01-26 VITALS
WEIGHT: 293 LBS | SYSTOLIC BLOOD PRESSURE: 139 MMHG | DIASTOLIC BLOOD PRESSURE: 80 MMHG | RESPIRATION RATE: 20 BRPM | BODY MASS INDEX: 51.91 KG/M2 | HEIGHT: 63 IN | HEART RATE: 81 BPM

## 2022-01-27 ENCOUNTER — PATIENT MESSAGE (OUTPATIENT)
Dept: FAMILY MEDICINE | Facility: CLINIC | Age: 62
End: 2022-01-27
Payer: COMMERCIAL

## 2022-03-17 ENCOUNTER — TELEPHONE (OUTPATIENT)
Dept: FAMILY MEDICINE | Facility: CLINIC | Age: 62
End: 2022-03-17
Payer: COMMERCIAL

## 2022-03-17 NOTE — TELEPHONE ENCOUNTER
Spoke w/ pt. She states she has appt on 4/12/22 for f/u but has been having SOB for about 9 mo, off/on, triggered by activity. She states she is wondering if she has asthma. Appt made for 3/25/22 to discuss SOB. Lab sched for 4/8/22 and she already has her f/u sched for 412/22. Pt agreed

## 2022-03-17 NOTE — TELEPHONE ENCOUNTER
----- Message from Mariella Herrera sent at 3/17/2022 10:42 AM CDT -----  Type:  Patient Call Back    Who Called: Concepcion    What is the reqeust in detail: Pt is requesting a call back regarding her appt on 04/12.Please Advise     Can the clinic reply by [facility]?    Best Call Back Number: [phone]

## 2022-03-18 DIAGNOSIS — I10 ESSENTIAL HYPERTENSION: ICD-10-CM

## 2022-03-18 DIAGNOSIS — E78.5 DYSLIPIDEMIA: ICD-10-CM

## 2022-03-18 DIAGNOSIS — E78.49 OTHER HYPERLIPIDEMIA: ICD-10-CM

## 2022-03-18 DIAGNOSIS — M79.10 MYALGIA: ICD-10-CM

## 2022-03-18 NOTE — TELEPHONE ENCOUNTER
No new care gaps identified.  Powered by [employer]. Reference number: [id number].   3/18/2022 9:28:46 AM CDT

## 2022-03-22 RX ORDER — TRIAMTERENE AND HYDROCHLOROTHIAZIDE 37.5; 25 MG/1; MG/1
CAPSULE ORAL
Qty: 90 CAPSULE | Refills: 3 | Status: SHIPPED | OUTPATIENT
Start: 2022-03-22 | End: 2022-03-25

## 2022-03-22 NOTE — TELEPHONE ENCOUNTER
Refill Routing Note   Medication(s) are not appropriate for processing by [facility] for the following reason(s):      - Allergy/Contraindication (pravastatin)    [facility] action(s):  Defer  Approve Medication-related problems identified: Adverse drug effects     Medication Therapy Plan: Pt reported myalgias while on atorvastatin- 100% adherence on pravastatin  --->Care Gap information included in message below if applicable.   Medication reconciliation completed: No   Automatic Epic Generated Protocol Data:    Orders Placed This Encounter    triamterene-hydrochlorothiazide 37.5-25 mg (DYAZIDE) 37.5-25 mg per capsule      Requested Prescriptions   Pending Prescriptions Disp Refills    pravastatin (PRAVACHOL) 40 MG tablet [Pharmacy Med Name: PRAVASTATIN SODIUM 40 MG TAB] 90 tablet 3     Sig: TAKE 1 TABLET BY MOUTH EVERY DAY IN THE EVENING       Cardiovascular:  Antilipid - Statins Passed - 3/18/2022  9:28 AM        Passed - Patient is at least 18 years old        Passed - Valid encounter within last 15 months     Recent Visits  Date Type Provider Dept   01/12/22 Office Visit Ernesto Mondragon MD [facility]   07/29/21 Office Visit Ernesto Mondragon MD [facility]   04/01/21 Office Visit Ernesto Mondragon MD [facility]   01/25/21 Office Visit Ernesto Mondragon MD [facility]   11/10/20 Office Visit Ernesto Mondragon MD [facility]   Showing recent visits within past 720 days and meeting all other requirements  Future Appointments  No visits were found meeting these conditions.  Showing future appointments within next 150 days and meeting all other requirements      Future Appointments              In 3 days Ernesto Mondragon MD [facility]    In 1 week [facility] [place] [facility]    In [facility]    In 3 weeks Ernesto Mondragon MD [facility]  [place]                Passed - ALT is 131 or below and within 360 days     ALT   Date Value Ref Range Status   01/19/2022 27 10 - 44 U/L Final   03/25/2021 28 10 - 44 U/L Final   01/19/2021 28 10 - 44 U/L Final              Passed - AST is 119 or below and within 360 days     AST   Date Value Ref Range Status   01/19/2022 23 10 - 40 U/L Final   03/25/2021 22 10 - 40 U/L Final   01/19/2021 23 10 - 40 U/L Final              Passed - Total Cholesterol within 360 days     Lab Results   Component Value Date    CHOL 175 01/19/2022    CHOL 140 03/25/2021    CHOL 199 01/19/2021              Passed - LDL within 360 days     LDL Cholesterol   Date Value Ref Range Status   01/19/2022 105.2 63.0 - 159.0 mg/dL Final     Comment:     The National Cholesterol Education Program (NCEP) has set the  following guidelines (reference values) for LDL Cholesterol:  Optimal.......................<130 mg/dL  Borderline High...............130-159 mg/dL  High..........................160-189 mg/dL  Very High.....................>190 mg/dL              Passed - HDL within 360 days     HDL   Date Value Ref Range Status   01/19/2022 49 40 - 75 mg/dL Final     Comment:     The National Cholesterol Education Program (NCEP) has set the  following guidelines (reference values) for HDL Cholesterol:  Low...............<40 mg/dL  Optimal...........>60 mg/dL              Passed - Triglycerides within 360 days     Lab Results   Component Value Date    TRIG 104 01/19/2022    TRIG 74 03/25/2021    TRIG 89 01/19/2021               Signed Prescriptions Disp Refills    triamterene-hydrochlorothiazide 37.5-25 mg (DYAZIDE) 37.5-25 mg per capsule 90 capsule 3     Sig: TAKE 1 CAPSULE BY MOUTH EVERY DAY IN THE MORNING       Cardiovascular: Diuretic Combos Passed - 3/18/2022  9:28 AM        Passed - Patient is at least 18 years old        Passed - Last BP in normal range within 360 days     BP Readings from Last 3 Encounters:   01/12/22 139/80   09/20/21 138/86    07/29/21 (!) 148/86               Passed - Valid encounter within last 15 months     Recent Visits  Date Type Provider Dept   01/12/22 Office Visit Ernesto Mondragon MD [facility]   07/29/21 Office Visit Ernesto Mondragon MD [facility]   04/01/21 Office Visit Ernesto Mondragon MD [facility]   01/25/21 Office Visit Ernesto Mondragon MD [facility]   11/10/20 Office Visit Ernesto Mondragon MD [facility]   Showing recent visits within past 720 days and meeting all other requirements  Future Appointments  No visits were found meeting these conditions.  Showing future appointments within next 150 days and meeting all other requirements      Future Appointments              In 3 days Ernesto Mondragon MD [facility]    In 1 week [facility] [place] [facility]    In 2 weeks [facility]    In 3 weeks Ernesto Mondragon MD [facility]                Passed - K in normal range and within 360 days     Potassium   Date Value Ref Range Status   01/19/2022 4.1 3.5 - 5.1 mmol/L Final   07/01/2021 3.7 3.5 - 5.1 mmol/L Final   03/25/2021 3.8 3.5 - 5.1 mmol/L Final              Passed - Na is between 130 and 148 and within 360 days     Sodium   Date Value Ref Range Status   01/19/2022 142 136 - 145 mmol/L Final   07/01/2021 141 136 - 145 mmol/L Final   03/25/2021 144 136 - 145 mmol/L Final              Passed - Cr is 1.39 or below and within 360 days     Lab Results   Component Value Date    CREATININE 0.8 01/19/2022    CREATININE 0.9 07/01/2021    CREATININE 0.8 03/25/2021              Passed - eGFR within 360 days     Lab Results   Component Value Date    EGFRNONAA >60.0 01/19/2022    EGFRNONAA >60.0 07/01/2021    EGFRNONAA >60.0 03/25/2021                      Appointments  past 12m or future 3m with PCP    Date Provider   Last Visit   1/12/2022 Ernesto Mondragon MD    Next Visit   3/25/2022 Ernesto Mondragon MD   ED visits in past 90 days: 0        Note composed:11:47 AM 03/22/2022

## 2022-03-25 ENCOUNTER — OFFICE VISIT (OUTPATIENT)
Dept: FAMILY MEDICINE | Facility: CLINIC | Age: 62
End: 2022-03-25
Payer: COMMERCIAL

## 2022-03-25 VITALS
DIASTOLIC BLOOD PRESSURE: 86 MMHG | HEART RATE: 69 BPM | SYSTOLIC BLOOD PRESSURE: 124 MMHG | BODY MASS INDEX: 51.91 KG/M2 | WEIGHT: 293 LBS | OXYGEN SATURATION: 100 % | HEIGHT: 63 IN

## 2022-03-25 DIAGNOSIS — R06.09 DYSPNEA ON EXERTION: Primary | ICD-10-CM

## 2022-03-25 DIAGNOSIS — R07.89 CHEST DISCOMFORT: ICD-10-CM

## 2022-03-25 DIAGNOSIS — G47.33 OSA ON CPAP: ICD-10-CM

## 2022-03-25 DIAGNOSIS — F41.1 GENERALIZED ANXIETY DISORDER: ICD-10-CM

## 2022-03-25 DIAGNOSIS — G47.9 SLEEP DISORDER: ICD-10-CM

## 2022-03-25 DIAGNOSIS — R60.0 BILATERAL LOWER EXTREMITY EDEMA: ICD-10-CM

## 2022-03-25 DIAGNOSIS — F41.0 PANIC ATTACKS: ICD-10-CM

## 2022-03-25 DIAGNOSIS — I10 ESSENTIAL HYPERTENSION: ICD-10-CM

## 2022-03-25 DIAGNOSIS — F41.8 SITUATIONAL ANXIETY: ICD-10-CM

## 2022-03-25 DIAGNOSIS — E66.01 MORBID OBESITY WITH BMI OF 50.0-59.9, ADULT: ICD-10-CM

## 2022-03-25 DIAGNOSIS — E78.49 OTHER HYPERLIPIDEMIA: ICD-10-CM

## 2022-03-25 PROCEDURE — 3074F PR MOST RECENT SYSTOLIC BLOOD PRESSURE < 130 MM HG: ICD-10-PCS | Mod: CPTII,S$GLB,, | Performed by: INTERNAL MEDICINE

## 2022-03-25 PROCEDURE — 93010 ELECTROCARDIOGRAM REPORT: CPT | Mod: S$GLB,,, | Performed by: INTERNAL MEDICINE

## 2022-03-25 PROCEDURE — 99215 PR OFFICE/OUTPT VISIT, EST, LEVL V, 40-54 MIN: ICD-10-PCS | Mod: S$GLB,,, | Performed by: INTERNAL MEDICINE

## 2022-03-25 PROCEDURE — 1159F MED LIST DOCD IN RCRD: CPT | Mod: CPTII,S$GLB,, | Performed by: INTERNAL MEDICINE

## 2022-03-25 PROCEDURE — 99999 PR PBB SHADOW E&M-EST. PATIENT-LVL V: CPT | Mod: PBBFAC,,, | Performed by: INTERNAL MEDICINE

## 2022-03-25 PROCEDURE — 93005 ELECTROCARDIOGRAM TRACING: CPT | Mod: S$GLB,,, | Performed by: INTERNAL MEDICINE

## 2022-03-25 PROCEDURE — 99215 OFFICE O/P EST HI 40 MIN: CPT | Mod: S$GLB,,, | Performed by: INTERNAL MEDICINE

## 2022-03-25 PROCEDURE — 3008F PR BODY MASS INDEX (BMI) DOCUMENTED: ICD-10-PCS | Mod: CPTII,S$GLB,, | Performed by: INTERNAL MEDICINE

## 2022-03-25 PROCEDURE — 1160F PR REVIEW ALL MEDS BY PRESCRIBER/CLIN PHARMACIST DOCUMENTED: ICD-10-PCS | Mod: CPTII,S$GLB,, | Performed by: INTERNAL MEDICINE

## 2022-03-25 PROCEDURE — 3079F PR MOST RECENT DIASTOLIC BLOOD PRESSURE 80-89 MM HG: ICD-10-PCS | Mod: CPTII,S$GLB,, | Performed by: INTERNAL MEDICINE

## 2022-03-25 PROCEDURE — 3079F DIAST BP 80-89 MM HG: CPT | Mod: CPTII,S$GLB,, | Performed by: INTERNAL MEDICINE

## 2022-03-25 PROCEDURE — 99999 PR PBB SHADOW E&M-EST. PATIENT-LVL V: ICD-10-PCS | Mod: PBBFAC,,, | Performed by: INTERNAL MEDICINE

## 2022-03-25 PROCEDURE — 1159F PR MEDICATION LIST DOCUMENTED IN MEDICAL RECORD: ICD-10-PCS | Mod: CPTII,S$GLB,, | Performed by: INTERNAL MEDICINE

## 2022-03-25 PROCEDURE — 3008F BODY MASS INDEX DOCD: CPT | Mod: CPTII,S$GLB,, | Performed by: INTERNAL MEDICINE

## 2022-03-25 PROCEDURE — 1160F RVW MEDS BY RX/DR IN RCRD: CPT | Mod: CPTII,S$GLB,, | Performed by: INTERNAL MEDICINE

## 2022-03-25 PROCEDURE — 93005 EKG 12-LEAD: ICD-10-PCS | Mod: S$GLB,,, | Performed by: INTERNAL MEDICINE

## 2022-03-25 PROCEDURE — 3074F SYST BP LT 130 MM HG: CPT | Mod: CPTII,S$GLB,, | Performed by: INTERNAL MEDICINE

## 2022-03-25 PROCEDURE — 93010 EKG 12-LEAD: ICD-10-PCS | Mod: S$GLB,,, | Performed by: INTERNAL MEDICINE

## 2022-03-25 RX ORDER — PRAVASTATIN SODIUM 40 MG/1
TABLET ORAL
Qty: 90 TABLET | Refills: 1 | Status: SHIPPED | OUTPATIENT
Start: 2022-03-25 | End: 2022-09-14

## 2022-03-25 RX ORDER — TRAZODONE HYDROCHLORIDE 50 MG/1
50 TABLET ORAL NIGHTLY PRN
Qty: 30 TABLET | Refills: 1 | Status: SHIPPED | OUTPATIENT
Start: 2022-03-25 | End: 2022-04-20

## 2022-03-25 RX ORDER — TRIAMTERENE AND HYDROCHLOROTHIAZIDE 37.5; 25 MG/1; MG/1
1 CAPSULE ORAL EVERY MORNING
Qty: 90 CAPSULE | Refills: 3 | Status: SHIPPED | OUTPATIENT
Start: 2022-03-25 | End: 2024-01-22 | Stop reason: SDUPTHER

## 2022-03-25 RX ORDER — BUSPIRONE HYDROCHLORIDE 5 MG/1
TABLET ORAL
Qty: 30 TABLET | Refills: 1 | Status: SHIPPED | OUTPATIENT
Start: 2022-03-25 | End: 2022-06-13 | Stop reason: SDUPTHER

## 2022-03-25 RX ORDER — SERTRALINE HYDROCHLORIDE 50 MG/1
TABLET, FILM COATED ORAL
Qty: 30 TABLET | Refills: 2 | Status: SHIPPED | OUTPATIENT
Start: 2022-03-25 | End: 2022-06-17

## 2022-03-25 RX ORDER — FUROSEMIDE 20 MG/1
20 TABLET ORAL DAILY
Qty: 30 TABLET | Refills: 2 | Status: SHIPPED | OUTPATIENT
Start: 2022-03-25 | End: 2022-06-17

## 2022-03-25 NOTE — PROGRESS NOTES
Subjective:       Patient ID: Concepcion Oneill is a 62 y.o. female.    Chief Complaint: Shortness of Breath (With exertion or after sitting for long period of time. Causes aching feeling in her chest. )    HPI Dyspnea on exertion:  Needs to watch her caloric intake and take in smaller portions for her meals.  Needs to work also add getting her weight down has gained 2 lb since last visit 01/12/2022.  Present weight 302 lb 9 oz at 5 ft 3.  -     IN OFFICE EKG 12-LEAD (to Muse)  -     busPIRone (BUSPAR) 5 MG Tab; 5-7.5 mg p.o. t.i.d. as needed for anxiety or panic attacks/chest discomfort.  Can use if needed for sleep 1 hour after trazodone.  Please provide generic  Dispense: 30 tablet; Refill: 1  Morbid obesity with BMI of 50.0-59.9, adult: Caloric restriction w regular exercise and weight reduction.  -     furosemide (LASIX) 20 MG tablet; Take 1 tablet (20 mg total) by mouth once daily. Generic  Dispense: 30 tablet; Refill: 2  Generalized anxiety disorder: Limit caffeine intake with stress reduction and regular exercise as tolerated.  -     sertraline (ZOLOFT) 50 MG tablet; Take a half 50 mg tablet p.o. daily for 1 week then increase to 50 mg daily thereafter.  Please provide generic.  Dispense: 30 tablet; Refill: 2  -     traZODone (DESYREL) 50 MG tablet; Take 1 tablet (50 mg total) by mouth nightly as needed for Insomnia. Please provide generic  Dispense: 30 tablet; Refill: 1  -     busPIRone (BUSPAR) 5 MG Tab; 5-7.5 mg p.o. t.i.d. as needed for anxiety or panic attacks/chest discomfort.  Can use if needed for sleep 1 hour after trazodone.  Please provide generic  Dispense: 30 tablet; Refill: 1  Situational anxiety: Limit caffeine intake with stress reduction and regular exercise as tolerated.  -     sertraline (ZOLOFT) 50 MG tablet; Take a half 50 mg tablet p.o. daily for 1 week then increase to 50 mg daily thereafter.  Please provide generic.  Dispense: 30 tablet; Refill: 2  -     traZODone (DESYREL) 50 MG  tablet; Take 1 tablet (50 mg total) by mouth nightly as needed for Insomnia. Please provide generic  Dispense: 30 tablet; Refill: 1  -     busPIRone (BUSPAR) 5 MG Tab; 5-7.5 mg p.o. t.i.d. as needed for anxiety or panic attacks/chest discomfort.  Can use if needed for sleep 1 hour after trazodone.  Please provide generic  Dispense: 30 tablet; Refill: 1  Panic attacks:  Limit caffeine intake;  -     sertraline (ZOLOFT) 50 MG tablet; Take a half 50 mg tablet p.o. daily for 1 week then increase to 50 mg daily thereafter.  Please provide generic.  Dispense: 30 tablet; Refill: 2  -     traZODone (DESYREL) 50 MG tablet; Take 1 tablet (50 mg total) by mouth nightly as needed for Insomnia. Please provide generic  Dispense: 30 tablet; Refill: 1  -     busPIRone (BUSPAR) 5 MG Tab; 5-7.5 mg p.o. t.i.d. as needed for anxiety or panic attacks/chest discomfort.  Can use if needed for sleep 1 hour after trazodone.  Please provide generic  Dispense: 30 tablet; Refill: 1  Sleep disorder:  Turn down blue light exposure from her iPhone computer or iPad after mid afternoon.  Limit caffeine intake after mid afternoon.  Can use BuSpar 1 hour after trazodone if used for sleep and still needs help sleeping.  -     sertraline (ZOLOFT) 50 MG tablet; Take a half 50 mg tablet p.o. daily for 1 week then increase to 50 mg daily thereafter.  Please provide generic.  Dispense: 30 tablet; Refill: 2  -     traZODone (DESYREL) 50 MG tablet; Take 1 tablet (50 mg total) by mouth nightly as needed for Insomnia. Please provide generic  Dispense: 30 tablet; Refill: 1  -     busPIRone (BUSPAR) 5 MG Tab; 5-7.5 mg p.o. t.i.d. as needed for anxiety or panic attacks/chest discomfort.  Can use if needed for sleep 1 hour after trazodone.  Please provide generic  Dispense: 30 tablet; Refill: 1  Chest discomfort; has mild right costochondritis; thermal heat applications; Tylenol arthritis for any pain or discomfort; had ASA 81 mg p.o. daily  -     IN OFFICE EKG  12-LEAD (to Muse)  -     sertraline (ZOLOFT) 50 MG tablet; Take a half 50 mg tablet p.o. daily for 1 week then increase to 50 mg daily thereafter.  Please provide generic.  Dispense: 30 tablet; Refill: 2  -     busPIRone (BUSPAR) 5 MG Tab; 5-7.5 mg p.o. t.i.d. as needed for anxiety or panic attacks/chest discomfort.  Can use if needed for sleep 1 hour after trazodone.  Please provide generic  Dispense: 30 tablet; Refill: 1  Bilateral lower extremity edema; Maintain less than 2 g sodium diet; elevate lower extremities at home; use compression stockings if possible.  -     furosemide (LASIX) 20 Mg tablet; Take 1 tablet (20 mg total) by mouth once daily. Generic  Dispense: 30 tablet; Refill: 2  Other hyperlipidemia; Maintain low fat high fiber diet, exercise regularly. Weight reduction where indicated. Cont present statin  Essential hypertension; .Maintain < 2 Gm Na a day diet, and monitor BP at home; keep a log.  Continue present management renewed the following medications.  Needs to monitor blood pressure at home and keep a log for office review.  -     triamterene-hydrochlorothiazide 37.5-25 mg (DYAZIDE) 37.5-25 mg per capsule; Take 1 capsule by mouth every morning. Take every day at noon  Dispense: 90 capsule; Refill: 3  JUANCHO on CPAP:  Uses CPAP at night and helps.    EKG today:  Normal sinus rhythm 69 beats per minute incomplete right bundle branch block.  T flat in AVF no acute ischemic changes noted.    Review of Systems   Constitutional: Negative for appetite change and fever.        Weight gain 2 lbs since 1/12/22.    HENT: Negative for congestion, postnasal drip, rhinorrhea and sinus pressure.    Eyes: Negative for discharge and itching.   Respiratory: Negative for cough, chest tightness, shortness of breath and wheezing.    Cardiovascular: Negative for chest pain, palpitations and leg swelling.        Chest discomfort across midsternum at times; has right costochondral tenderness to palp.   "  Gastrointestinal: Negative for abdominal distention, abdominal pain, blood in stool, constipation, diarrhea, nausea and vomiting.   Endocrine: Negative for polydipsia, polyphagia and polyuria.   Genitourinary: Negative for dysuria and hematuria.   Musculoskeletal: Negative for arthralgias and myalgias.   Skin: Negative for rash.   Allergic/Immunologic: Negative for environmental allergies and food allergies.   Neurological: Negative for tremors, seizures and syncope.   Hematological: Negative for adenopathy. Does not bruise/bleed easily.   Psychiatric/Behavioral: Positive for dysphoric mood and sleep disturbance. The patient is nervous/anxious.       Objective:        Vitals:    22 1218   BP: 124/86   Pulse: 69   SpO2: 100%   Weight: (!) 137.2 kg (302 lb 9.3 oz)   Height: 5' 3" (1.6 m)       BMI Readings from Last 3 Encounters:   22 53.60 kg/m²   22 53.21 kg/m²   21 55.08 kg/m²        Wt Readings from Last 3 Encounters:   22 1218 (!) 137.2 kg (302 lb 9.3 oz)   22 1457 (!) 136.3 kg (300 lb 6 oz)   21 1420 (!) 136.6 kg (301 lb 2.4 oz)        BP Readings from Last 3 Encounters:   22 124/86   22 139/80   21 138/86        There are no preventive care reminders to display for this patient.     Health Maintenance Due   Topic Date Due    Hepatitis C Screening  Never done    HIV Screening  Never done    TETANUS VACCINE  Never done    Shingles Vaccine (1 of 2) Never done    COVID-19 Vaccine (3 - Booster for Pfizer series) 2021         Past Medical History:   Diagnosis Date    Arthritis     Bronchitis in child     Obesity     Sleep apnea     uses cpap       Past Surgical History:   Procedure Laterality Date     SECTION      LGA; shoulders too big.     COLONOSCOPY      COLONOSCOPY N/A 9/15/2020    Procedure: COLONOSCOPY;  Surgeon: Phan Chakraborty MD;  Location: [facility];  Service: Endoscopy;  Laterality: N/A;    HYSTERECTOMY   "    w BSO; pregnant in tube; then 1984 when pregnant in ovary later had hysterectomy.        Social History     Tobacco Use    Smoking status: Never Smoker    Smokeless tobacco: Never Used   Substance Use Topics    Alcohol use: Not Currently     Comment: Just for Holidays    Drug use: Never       Family History   Problem Relation Age of Onset    Diabetes Sister     Heart disease Maternal Aunt     Breast cancer Maternal Aunt     Diabetes Sister     Cancer Other     Cancer Paternal Uncle         colon cancer       Review of patient's allergies indicates:   Allergen Reactions    Nitrofurantoin      Sec dose led to cramps and nausea so was discontinued    Atorvastatin      Led to myalgias so was discontinued; not benefited much by Co Q10 supplementation       Current Outpatient Medications on File Prior to Visit   Medication Sig Dispense Refill    ascorbic acid, vitamin C, (VITAMIN C) 1000 MG tablet Take 1,000 mg by mouth once daily.      fluticasone propionate (FLONASE) 50 mcg/actuation nasal spray 1-2 sprays a day for congestion 16 g 2    glucosamine HCl/chondroitin ambrose (GLUCOSAMINE-CHONDROITIN ORAL) Take by mouth 2 (two) times daily as needed.       loratadine (CLARITIN) 10 mg tablet TAKE 1 TABLET (10 MG TOTAL) BY MOUTH DAILY AS NEEDED FOR ALLERGIES (CONGESTION). 90 tablet 1    multivitamin with minerals tablet Take 1 tablet by mouth once daily.      omega-3 fatty acids/fish oil (FISH OIL OMEGA 3-6-9 ORAL) Take by mouth 2 (two) times a day.      pantoprazole (PROTONIX) 40 MG tablet 40 mg po daily as needed for reflux. (Patient taking differently: Take 40 mg by mouth daily as needed. for reflux.) 90 tablet 1    pravastatin (PRAVACHOL) 40 MG tablet TAKE 1 TABLET BY MOUTH EVERY DAY IN THE EVENING 90 tablet 1    ubidecarenone (COQ-10 ORAL) Take by mouth once daily.      [DISCONTINUED] triamterene-hydrochlorothiazide 37.5-25 mg (DYAZIDE) 37.5-25 mg per capsule TAKE 1 CAPSULE BY MOUTH EVERY DAY IN THE  MORNING 90 capsule 3    [DISCONTINUED] pravastatin (PRAVACHOL) 40 MG tablet Take 1 tablet (40 mg total) by mouth every evening. 30 tablet 3     No current facility-administered medications on file prior to visit.       Physical Exam  Vitals reviewed.   Constitutional:       Appearance: She is well-developed.   HENT:      Head: Normocephalic and atraumatic.   Neck:      Thyroid: No thyromegaly.   Cardiovascular:      Rate and Rhythm: Normal rate and regular rhythm.      Heart sounds: Normal heart sounds. No murmur heard.    No gallop.   Pulmonary:      Effort: Pulmonary effort is normal. No respiratory distress.      Breath sounds: Normal breath sounds. No wheezing or rales.   Abdominal:      General: Bowel sounds are normal. There is no distension.      Palpations: Abdomen is soft.      Tenderness: There is no abdominal tenderness. There is no guarding or rebound.   Musculoskeletal:         General: Normal range of motion.      Cervical back: Normal range of motion and neck supple.      Right lower leg: Edema present.      Left lower leg: Edema present.      Comments: Obese lower extremities; spider veins w 2-3+ edema. No spider tenderness to palp.    Lymphadenopathy:      Cervical: No cervical adenopathy.   Skin:     Findings: No rash.   Neurological:      Mental Status: She is alert and oriented to person, place, and time.      Comments: Moves all 4 extremities fine.   Psychiatric:         Behavior: Behavior normal.         Thought Content: Thought content normal.         Assessment:       1. Dyspnea on exertion    2. Morbid obesity with BMI of 50.0-59.9, adult    3. Generalized anxiety disorder    4. Situational anxiety    5. Panic attacks    6. Sleep disorder    7. Chest discomfort    8. Bilateral lower extremity edema    9. Other hyperlipidemia    10. Essential hypertension    11. JUANCHO on CPAP        Plan:       Dyspnea on exertion:  Needs to watch her caloric intake and take in smaller portions for her meals.   Needs to work also add getting her weight down has gained 2 lb since last visit 01/12/2022.  Present weight 302 lb 9 oz at 5 ft 3.  -     IN OFFICE EKG 12-LEAD (to Muse)  -     busPIRone (BUSPAR) 5 MG Tab; 5-7.5 mg p.o. t.i.d. as needed for anxiety or panic attacks/chest discomfort.  Can use if needed for sleep 1 hour after trazodone.  Please provide generic  Dispense: 30 tablet; Refill: 1    Morbid obesity with BMI of 50.0-59.9, adult: Caloric restriction w regular exercise and weight reduction.  -     furosemide (LASIX) 20 MG tablet; Take 1 tablet (20 mg total) by mouth once daily. Generic  Dispense: 30 tablet; Refill: 2    Generalized anxiety disorder: Limit caffeine intake with stress reduction and regular exercise as tolerated.  -     sertraline (ZOLOFT) 50 MG tablet; Take a half 50 mg tablet p.o. daily for 1 week then increase to 50 mg daily thereafter.  Please provide generic.  Dispense: 30 tablet; Refill: 2  -     traZODone (DESYREL) 50 MG tablet; Take 1 tablet (50 mg total) by mouth nightly as needed for Insomnia. Please provide generic  Dispense: 30 tablet; Refill: 1  -     busPIRone (BUSPAR) 5 MG Tab; 5-7.5 mg p.o. t.i.d. as needed for anxiety or panic attacks/chest discomfort.  Can use if needed for sleep 1 hour after trazodone.  Please provide generic  Dispense: 30 tablet; Refill: 1    Situational anxiety: Limit caffeine intake with stress reduction and regular exercise as tolerated.  -     sertraline (ZOLOFT) 50 MG tablet; Take a half 50 mg tablet p.o. daily for 1 week then increase to 50 mg daily thereafter.  Please provide generic.  Dispense: 30 tablet; Refill: 2  -     traZODone (DESYREL) 50 MG tablet; Take 1 tablet (50 mg total) by mouth nightly as needed for Insomnia. Please provide generic  Dispense: 30 tablet; Refill: 1  -     busPIRone (BUSPAR) 5 MG Tab; 5-7.5 mg p.o. t.i.d. as needed for anxiety or panic attacks/chest discomfort.  Can use if needed for sleep 1 hour after trazodone.  Please  provide generic  Dispense: 30 tablet; Refill: 1    Panic attacks:  Limit caffeine intake;  -     sertraline (ZOLOFT) 50 MG tablet; Take a half 50 mg tablet p.o. daily for 1 week then increase to 50 mg daily thereafter.  Please provide generic.  Dispense: 30 tablet; Refill: 2  -     traZODone (DESYREL) 50 MG tablet; Take 1 tablet (50 mg total) by mouth nightly as needed for Insomnia. Please provide generic  Dispense: 30 tablet; Refill: 1  -     busPIRone (BUSPAR) 5 MG Tab; 5-7.5 mg p.o. t.i.d. as needed for anxiety or panic attacks/chest discomfort.  Can use if needed for sleep 1 hour after trazodone.  Please provide generic  Dispense: 30 tablet; Refill: 1    Sleep disorder:  Turn down blue light exposure from her [employer] computer or iPad after mid afternoon.  Limit caffeine intake after mid afternoon.  Can use BuSpar 1 hour after trazodone if used for sleep and still needs help sleeping.  -     sertraline (ZOLOFT) 50 MG tablet; Take a half 50 mg tablet p.o. daily for 1 week then increase to 50 mg daily thereafter.  Please provide generic.  Dispense: 30 tablet; Refill: 2  -     traZODone (DESYREL) 50 MG tablet; Take 1 tablet (50 mg total) by mouth nightly as needed for Insomnia. Please provide generic  Dispense: 30 tablet; Refill: 1  -     busPIRone (BUSPAR) 5 MG Tab; 5-7.5 mg p.o. t.i.d. as needed for anxiety or panic attacks/chest discomfort.  Can use if needed for sleep 1 hour after trazodone.  Please provide generic  Dispense: 30 tablet; Refill: 1    Chest discomfort; has mild right costochondritis; thermal heat applications; Tylenol arthritis for any pain or discomfort; had ASA 81 mg p.o. daily  -     IN OFFICE EKG 12-LEAD (to Muse)  -     sertraline (ZOLOFT) 50 MG tablet; Take a half 50 mg tablet p.o. daily for 1 week then increase to 50 mg daily thereafter.  Please provide generic.  Dispense: 30 tablet; Refill: 2  -     busPIRone (BUSPAR) 5 MG Tab; 5-7.5 mg p.o. t.i.d. as needed for anxiety or panic  attacks/chest discomfort.  Can use if needed for sleep 1 hour after trazodone.  Please provide generic  Dispense: 30 tablet; Refill: 1    Bilateral lower extremity edema; Maintain less than 2 g sodium diet; elevate lower extremities at home; use compression stockings if possible.  -     furosemide (LASIX) 20 MG tablet; Take 1 tablet (20 mg total) by mouth once daily. Generic  Dispense: 30 tablet; Refill: 2    Other hyperlipidemia; Maintain low fat high fiber diet, exercise regularly. Weight reduction where indicated. Cont present statin    Essential hypertension; .Maintain < 2 Gm Na a day diet, and monitor BP at home; keep a log.  Continue present management renewed the following medications.  Needs to monitor blood pressure at home and keep a log for office review.  -     triamterene-hydrochlorothiazide 37.5-25 mg (DYAZIDE) 37.5-25 mg per capsule; Take 1 capsule by mouth every morning. Take every day at noon  Dispense: 90 capsule; Refill: 3    JUANCHO on CPAP:  Uses CPAP at night and helps.

## 2022-03-25 NOTE — PATIENT INSTRUCTIONS
Dyspnea on exertion:  Needs to watch her caloric intake and take in smaller portions for her meals.  Needs to work also add getting her weight down has gained 2 lb since last visit 01/12/2022.  Present weight 302 lb 9 oz at 5 ft 3.  -     IN OFFICE EKG 12-LEAD (to Muse)  -     busPIRone (BUSPAR) 5 MG Tab; 5-7.5 mg p.o. t.i.d. as needed for anxiety or panic attacks/chest discomfort.  Can use if needed for sleep 1 hour after trazodone.  Please provide generic  Dispense: 30 tablet; Refill: 1    Morbid obesity with BMI of 50.0-59.9, adult: Caloric restriction w regular exercise and weight reduction.  -     furosemide (LASIX) 20 MG tablet; Take 1 tablet (20 mg total) by mouth once daily. Generic  Dispense: 30 tablet; Refill: 2    Generalized anxiety disorder: Limit caffeine intake with stress reduction and regular exercise as tolerated.  -     sertraline (ZOLOFT) 50 MG tablet; Take a half 50 mg tablet p.o. daily for 1 week then increase to 50 mg daily thereafter.  Please provide generic.  Dispense: 30 tablet; Refill: 2  -     traZODone (DESYREL) 50 MG tablet; Take 1 tablet (50 mg total) by mouth nightly as needed for Insomnia. Please provide generic  Dispense: 30 tablet; Refill: 1  -     busPIRone (BUSPAR) 5 MG Tab; 5-7.5 mg p.o. t.i.d. as needed for anxiety or panic attacks/chest discomfort.  Can use if needed for sleep 1 hour after trazodone.  Please provide generic  Dispense: 30 tablet; Refill: 1    Situational anxiety: Limit caffeine intake with stress reduction and regular exercise as tolerated.  -     sertraline (ZOLOFT) 50 MG tablet; Take a half 50 mg tablet p.o. daily for 1 week then increase to 50 mg daily thereafter.  Please provide generic.  Dispense: 30 tablet; Refill: 2  -     traZODone (DESYREL) 50 MG tablet; Take 1 tablet (50 mg total) by mouth nightly as needed for Insomnia. Please provide generic  Dispense: 30 tablet; Refill: 1  -     busPIRone (BUSPAR) 5 MG Tab; 5-7.5 mg p.o. t.i.d. as needed for  anxiety or panic attacks/chest discomfort.  Can use if needed for sleep 1 hour after trazodone.  Please provide generic  Dispense: 30 tablet; Refill: 1    Panic attacks:  Limit caffeine intake;  -     sertraline (ZOLOFT) 50 MG tablet; Take a half 50 mg tablet p.o. daily for 1 week then increase to 50 mg daily thereafter.  Please provide generic.  Dispense: 30 tablet; Refill: 2  -     traZODone (DESYREL) 50 MG tablet; Take 1 tablet (50 mg total) by mouth nightly as needed for Insomnia. Please provide generic  Dispense: 30 tablet; Refill: 1  -     busPIRone (BUSPAR) 5 MG Tab; 5-7.5 mg p.o. t.i.d. as needed for anxiety or panic attacks/chest discomfort.  Can use if needed for sleep 1 hour after trazodone.  Please provide generic  Dispense: 30 tablet; Refill: 1    Sleep disorder:  Turn down blue light exposure from her [employer] computer or iPad after mid afternoon.  Limit caffeine intake after mid afternoon.  Can use BuSpar 1 hour after trazodone if used for sleep and still needs help sleeping.  -     sertraline (ZOLOFT) 50 MG tablet; Take a half 50 mg tablet p.o. daily for 1 week then increase to 50 mg daily thereafter.  Please provide generic.  Dispense: 30 tablet; Refill: 2  -     traZODone (DESYREL) 50 MG tablet; Take 1 tablet (50 mg total) by mouth nightly as needed for Insomnia. Please provide generic  Dispense: 30 tablet; Refill: 1  -     busPIRone (BUSPAR) 5 MG Tab; 5-7.5 mg p.o. t.i.d. as needed for anxiety or panic attacks/chest discomfort.  Can use if needed for sleep 1 hour after trazodone.  Please provide generic  Dispense: 30 tablet; Refill: 1    Chest discomfort; has mild right costochondritis; thermal heat applications; Tylenol arthritis for any pain or discomfort.  ASA 81 mg daily  -     IN OFFICE EKG 12-LEAD (to Muse)  -     sertraline (ZOLOFT) 50 MG tablet; Take a half 50 mg tablet p.o. daily for 1 week then increase to 50 mg daily thereafter.  Please provide generic.  Dispense: 30 tablet; Refill: 2  -      busPIRone (BUSPAR) 5 MG Tab; 5-7.5 mg p.o. t.i.d. as needed for anxiety or panic attacks/chest discomfort.  Can use if needed for sleep 1 hour after trazodone.  Please provide generic  Dispense: 30 tablet; Refill: 1    Bilateral lower extremity edema; Maintain less than 2 g sodium diet; elevate lower extremities at home; use compression stockings if possible.  -     furosemide (LASIX) 20 MG tablet; Take 1 tablet (20 mg total) by mouth once daily. Generic  Dispense: 30 tablet; Refill: 2    Other hyperlipidemia; Maintain low fat high fiber diet, exercise regularly. Weight reduction where indicated. Cont present statin    Essential hypertension; .Maintain < 2 Gm Na a day diet, and monitor BP at home; keep a log.  Continue present management renewed the following medications.  Needs to monitor blood pressure at home and keep a log for office review.  -     triamterene-hydrochlorothiazide 37.5-25 mg (DYAZIDE) 37.5-25 mg per capsule; Take 1 capsule by mouth every morning. Take every day at noon  Dispense: 90 capsule; Refill: 3    JUANCHO on CPAP:  Uses CPAP at night and helps.

## 2022-03-28 ENCOUNTER — OFFICE VISIT (OUTPATIENT)
Dept: OTOLARYNGOLOGY | Facility: CLINIC | Age: 62
End: 2022-03-28
Payer: COMMERCIAL

## 2022-03-28 ENCOUNTER — TELEPHONE (OUTPATIENT)
Dept: OTOLARYNGOLOGY | Facility: CLINIC | Age: 62
End: 2022-03-28

## 2022-03-28 VITALS — WEIGHT: 293 LBS | BODY MASS INDEX: 51.91 KG/M2 | TEMPERATURE: 98 F | HEIGHT: 63 IN

## 2022-03-28 DIAGNOSIS — R23.9 SKIN CHANGE: Primary | ICD-10-CM

## 2022-03-28 PROCEDURE — 99203 PR OFFICE/OUTPT VISIT, NEW, LEVL III, 30-44 MIN: ICD-10-PCS | Mod: S$GLB,,, | Performed by: PHYSICIAN ASSISTANT

## 2022-03-28 PROCEDURE — 3008F PR BODY MASS INDEX (BMI) DOCUMENTED: ICD-10-PCS | Mod: CPTII,S$GLB,, | Performed by: PHYSICIAN ASSISTANT

## 2022-03-28 PROCEDURE — 99203 OFFICE O/P NEW LOW 30 MIN: CPT | Mod: S$GLB,,, | Performed by: PHYSICIAN ASSISTANT

## 2022-03-28 PROCEDURE — 99999 PR PBB SHADOW E&M-EST. PATIENT-LVL IV: ICD-10-PCS | Mod: PBBFAC,,, | Performed by: PHYSICIAN ASSISTANT

## 2022-03-28 PROCEDURE — 99999 PR PBB SHADOW E&M-EST. PATIENT-LVL IV: CPT | Mod: PBBFAC,,, | Performed by: PHYSICIAN ASSISTANT

## 2022-03-28 PROCEDURE — 1159F MED LIST DOCD IN RCRD: CPT | Mod: CPTII,S$GLB,, | Performed by: PHYSICIAN ASSISTANT

## 2022-03-28 PROCEDURE — 1160F PR REVIEW ALL MEDS BY PRESCRIBER/CLIN PHARMACIST DOCUMENTED: ICD-10-PCS | Mod: CPTII,S$GLB,, | Performed by: PHYSICIAN ASSISTANT

## 2022-03-28 PROCEDURE — 1160F RVW MEDS BY RX/DR IN RCRD: CPT | Mod: CPTII,S$GLB,, | Performed by: PHYSICIAN ASSISTANT

## 2022-03-28 PROCEDURE — 1159F PR MEDICATION LIST DOCUMENTED IN MEDICAL RECORD: ICD-10-PCS | Mod: CPTII,S$GLB,, | Performed by: PHYSICIAN ASSISTANT

## 2022-03-28 PROCEDURE — 3008F BODY MASS INDEX DOCD: CPT | Mod: CPTII,S$GLB,, | Performed by: PHYSICIAN ASSISTANT

## 2022-03-28 NOTE — PROGRESS NOTES
[facility]    Subjective:      Patient: Concepcion Oneill Patient PCP: Ernesto Mondragon MD         :  1960     Sex:  female      MRN:  [id number]          Date of Visit: 2022      Chief Complaint: white clusters in right nostril    Patient ID: Concepcion Oneill is a 62 y.o. female who was self-referred for white clusters in right nostril. Pt previously seen by ENT is [place] who prescribed her mupirocin twice daily for 14 days which helped, but she continues to have recurrence.. Pt states that this has been a recurrent issue x 6-7 years. Pt states that she is here for nose check. Pt currently uses CPAP for JUANCHO-pt is using distilled water. Pt denies fever/chills. Pt denies fever/chills, drenching night sweats, or unintentional weight loss. Pt denies personal h/o skin cancer.     Review of Systems   Constitutional: Negative for chills and fever.   HENT:        White clusters in anterior nasal septum      Past Medical History  She has a past medical history of Arthritis, Bronchitis in child, Obesity, and Sleep apnea.    Family History  Her family history includes Breast cancer in her maternal aunt; Cancer in her other and paternal uncle; Diabetes in her sister and sister; Heart disease in her maternal aunt.    Past Surgical History:   Procedure Laterality Date     SECTION      LGA; shoulders too big.     COLONOSCOPY      COLONOSCOPY N/A 9/15/2020    Procedure: COLONOSCOPY;  Surgeon: Phan Chakraborty MD;  Location: [facility];  Service: Endoscopy;  Laterality: N/A;    HYSTERECTOMY      w BSO; pregnant in tube; then  when pregnant in ovary later had hysterectomy.      Social History     Tobacco Use    Smoking status: Never Smoker    Smokeless tobacco: Never Used   Substance and Sexual Activity    Alcohol use: Not Currently     Comment: Just for Holidays    Drug use: Never    Sexual activity: Yes     Partners: Male     Medications  She has a current medication list which  includes the following prescription(s): ascorbic acid (vitamin c), buspirone, fluticasone propionate, furosemide, glucosamine hcl/chondroitin ambrose, loratadine, multivitamin with minerals, omega-3 fatty acids/fish oil, pantoprazole, pravastatin, sertraline, trazodone, triamterene-hydrochlorothiazide 37.5-25 mg, and ubidecarenone.    Review of patient's allergies indicates:   Allergen Reactions    Nitrofurantoin      Sec dose led to cramps and nausea so was discontinued    Atorvastatin      Led to myalgias so was discontinued; not benefited much by Co Q10 supplementation     All medications, allergies, and past history have been reviewed.    Objective:      Vitals:  Vitals - 1 value per visit 3/25/2022 3/28/2022 3/28/2022   SYSTOLIC 124 - -   DIASTOLIC 86 - -   Pulse 69 - -   Temp - - 97.9   Resp - - -   SPO2 100 - -   Weight (lb) 302.58 - 301.37   Weight (kg) 137.25 - 136.7   Height 63 - 63   BMI (Calculated) 53.6 - 53.4   VISIT REPORT - - -   Pain Score  - 0 -       Body surface area is 2.47 meters squared.  Physical Exam  Constitutional:       General: She is not in acute distress.     Appearance: Normal appearance. She is not ill-appearing.   HENT:      Head: Normocephalic and atraumatic.      Right Ear: Tympanic membrane, ear canal and external ear normal.      Left Ear: Tympanic membrane, ear canal and external ear normal.      Nose: Nose normal.      Comments: Skin change with white appearance on right anterior nasal septum     Mouth/Throat:      Lips: Pink. No lesions.      Mouth: Mucous membranes are moist. No oral lesions.      Tongue: No lesions.      Palate: No lesions.      Pharynx: Oropharynx is clear. Uvula midline. No pharyngeal swelling, oropharyngeal exudate, posterior oropharyngeal erythema or uvula swelling.   Eyes:      General:         Right eye: No discharge.         Left eye: No discharge.      Extraocular Movements: Extraocular movements intact.      Conjunctiva/sclera: Conjunctivae normal.    Pulmonary:      Effort: Pulmonary effort is normal.   Neurological:      General: No focal deficit present.      Mental Status: She is alert and oriented to person, place, and time. Mental status is at baseline.   Psychiatric:         Mood and Affect: Mood normal.         Behavior: Behavior normal.         Thought Content: Thought content normal.         Judgment: Judgment normal.       Labs:  WBC   Date Value Ref Range Status   07/01/2021 6.46 3.90 - 12.70 K/uL Final     Eosinophil %   Date Value Ref Range Status   07/01/2021 2.3 0.0 - 8.0 % Final     Eos #   Date Value Ref Range Status   07/01/2021 0.2 0.0 - 0.5 K/uL Final     Platelets   Date Value Ref Range Status   07/01/2021 299 150 - 450 K/uL Final     Glucose   Date Value Ref Range Status   01/19/2022 94 70 - 110 mg/dL Final     All lab results, imaging results, and data have been reviewed.    Assessment:        ICD-10-CM ICD-9-CM   1. Skin change  R23.9 782.9            Plan:      Skin change  -Skin change with white appearance on right anterior nasal septum. Referral to Dr. Anna Whitman for further evaluation for possible biopsy.

## 2022-03-29 ENCOUNTER — TELEPHONE (OUTPATIENT)
Dept: OTOLARYNGOLOGY | Facility: CLINIC | Age: 62
End: 2022-03-29
Payer: COMMERCIAL

## 2022-03-29 ENCOUNTER — TELEPHONE (OUTPATIENT)
Dept: DERMATOLOGY | Facility: CLINIC | Age: 62
End: 2022-03-29
Payer: COMMERCIAL

## 2022-03-29 DIAGNOSIS — J34.89 LESION OF NOSE: Primary | ICD-10-CM

## 2022-03-29 NOTE — TELEPHONE ENCOUNTER
Tried to reach pt. No answer, will try again later and I left a message on answering machine.    Contacting to inform pt of openings in dept.

## 2022-03-29 NOTE — TELEPHONE ENCOUNTER
----- Message from Jordi Colvin MA sent at 3/29/2022  3:36 PM CDT -----  Contact: NEHA RODRÍGUEZ [[id number]]  Type: Needs Medical Advice    Who Called:NEHA RODRÍGUEZ [[id number]]  Best Call Back Number: [phone]  Inquiry/Question: Would you kindly call NEHA RODRÍGUEZ [[id number]] regarding a missed call from the clinic to discuss open appts Thank you~

## 2022-03-29 NOTE — TELEPHONE ENCOUNTER
Contacted pt per provider note below. With pt, I discussed Dr. Whitman's recommendation with seeing dermatology for skin lesion in nose. Pt acknowledged and wanted to be scheduled with Dr. Hope in [place]. I was having difficulty scheduling on her schedule. Pt stated that she will call Dr. Hope's office for scheduling.         ----- Message from Don Nguyen PA-C sent at 3/29/2022 12:11 PM CDT -----  Regarding: RE: Referral  Thank you so much. Sorry! I am placing referral for dermatology. Have a great day!    Rachel, can you please call pt and have her set up with derm? Thank you!  ----- Message -----  From: Rachel Green MA  Sent: 3/29/2022   9:31 AM CDT  To: Don Nguyen PA-C  Subject: FW: Referral                                     FYI. Read following note. Thanks.   ----- Message -----  From: Alyssa Mccabe RN  Sent: 3/29/2022   9:21 AM CDT  To: [facility] Staff  Subject: Referral                                         Good Morning,    Thank you for sending referral to Dr. Whitman. After reviewing the chart, Dr. Whitman suggests a referral to Dermatology for skin biopsy. According to the notes, patient has seen Dr. Siri Hope in the past. For now we will cancel the referral to Dr. Whitman and if there are any questions, please contact the office.    Thanks Again,  ARIEL Shah    Oncology Nurse Navigator  [phone]

## 2022-03-29 NOTE — TELEPHONE ENCOUNTER
----- Message from Sharyn KHOI Kaur sent at 3/29/2022  1:19 PM CDT -----  Regarding: appointment  Contact: patient  Type:  Sooner Apoointment Request    Caller is requesting a sooner appointment.  Caller declined first available appointment listed below.  Caller will not accept being placed on the waitlist and is requesting a message be sent to doctor.    Name of Caller:  patient  When is the first available appointment?    Symptoms:  white cluster in nose  Best Call Back Number:  [phone] (home)   Additional Information:  Patient was referred by a ENT John Nguyen to see dermatology. Patient was a former patient of Dr Hope. Please call patient to advise.Thanks!

## 2022-04-04 ENCOUNTER — PATIENT OUTREACH (OUTPATIENT)
Dept: ADMINISTRATIVE | Facility: OTHER | Age: 62
End: 2022-04-04
Payer: COMMERCIAL

## 2022-04-04 ENCOUNTER — OFFICE VISIT (OUTPATIENT)
Dept: UROLOGY | Facility: CLINIC | Age: 62
End: 2022-04-04
Payer: COMMERCIAL

## 2022-04-04 VITALS — WEIGHT: 293 LBS | BODY MASS INDEX: 51.91 KG/M2 | HEIGHT: 63 IN

## 2022-04-04 DIAGNOSIS — R10.2 SUPRAPUBIC PRESSURE: ICD-10-CM

## 2022-04-04 DIAGNOSIS — N39.0 RECURRENT UTI (URINARY TRACT INFECTION): Primary | ICD-10-CM

## 2022-04-04 DIAGNOSIS — R30.0 DYSURIA: ICD-10-CM

## 2022-04-04 DIAGNOSIS — N95.2 ATROPHIC VAGINITIS: ICD-10-CM

## 2022-04-04 LAB
BILIRUB SERPL-MCNC: NORMAL MG/DL
BLOOD URINE, POC: NORMAL
CLARITY, POC UA: CLEAR
COLOR, POC UA: YELLOW
GLUCOSE UR QL STRIP: NORMAL
KETONES UR QL STRIP: NORMAL
LEUKOCYTE ESTERASE URINE, POC: NORMAL
NITRITE, POC UA: NORMAL
PH, POC UA: 6
PROTEIN, POC: NORMAL
SPECIFIC GRAVITY, POC UA: 1.01
UROBILINOGEN, POC UA: 0.2

## 2022-04-04 PROCEDURE — 99204 PR OFFICE/OUTPT VISIT, NEW, LEVL IV, 45-59 MIN: ICD-10-PCS | Mod: S$GLB,,,

## 2022-04-04 PROCEDURE — 99999 PR PBB SHADOW E&M-EST. PATIENT-LVL IV: ICD-10-PCS | Mod: PBBFAC,,,

## 2022-04-04 PROCEDURE — 1160F PR REVIEW ALL MEDS BY PRESCRIBER/CLIN PHARMACIST DOCUMENTED: ICD-10-PCS | Mod: CPTII,S$GLB,,

## 2022-04-04 PROCEDURE — 99204 OFFICE O/P NEW MOD 45 MIN: CPT | Mod: S$GLB,,,

## 2022-04-04 PROCEDURE — 99999 PR PBB SHADOW E&M-EST. PATIENT-LVL IV: CPT | Mod: PBBFAC,,,

## 2022-04-04 PROCEDURE — 3008F BODY MASS INDEX DOCD: CPT | Mod: CPTII,S$GLB,,

## 2022-04-04 PROCEDURE — 1159F MED LIST DOCD IN RCRD: CPT | Mod: CPTII,S$GLB,,

## 2022-04-04 PROCEDURE — 3008F PR BODY MASS INDEX (BMI) DOCUMENTED: ICD-10-PCS | Mod: CPTII,S$GLB,,

## 2022-04-04 PROCEDURE — 81002 POCT URINE DIPSTICK WITHOUT MICROSCOPE: ICD-10-PCS | Mod: S$GLB,,,

## 2022-04-04 PROCEDURE — 81002 URINALYSIS NONAUTO W/O SCOPE: CPT | Mod: S$GLB,,,

## 2022-04-04 PROCEDURE — 87086 URINE CULTURE/COLONY COUNT: CPT

## 2022-04-04 PROCEDURE — 1160F RVW MEDS BY RX/DR IN RCRD: CPT | Mod: CPTII,S$GLB,,

## 2022-04-04 PROCEDURE — 1159F PR MEDICATION LIST DOCUMENTED IN MEDICAL RECORD: ICD-10-PCS | Mod: CPTII,S$GLB,,

## 2022-04-04 RX ORDER — ESTRADIOL 0.1 MG/G
1 CREAM VAGINAL
Qty: 12 G | Refills: 6 | Status: SHIPPED | OUTPATIENT
Start: 2022-04-04 | End: 2022-11-01

## 2022-04-04 NOTE — PROGRESS NOTES
[facility] Note  Staff: Jessica Monique FNP-C    PCP: MD Alanna    Chief Complaint: Recurrent UTI    Subjective:        HPI: Concepcion Oneill is a 62 y.o. female NEW PATIENT presents today for recurrent UTIs. States she gets an infection every 3 months. Last visit was 2021 to ER, leukocytes seen on UA but no culture was ordered. Last urine culture seen was in 2020, positive for PROTEUS MIRABILIS. Pt does not have any symptoms presently. Symptoms during acute infection include burning and pressure while urinating and sometimes lower back pain. She also complains of burning to her vagina, dryness, and pain with intercourse.     Questions asked pt during office visit today:  NTF: 3 x night  Urgency:No, incontinence with urgency? No;   Incontinence with laughing or straining: No;   DysuriaYes - during infection  Gross HematuriaNo  History of UTI: Yes    History of Kidney Stones?:  No    Constipation issues?:  No    REVIEW OF SYSTEMS:  Review of Systems   Constitutional: Negative.    HENT: Negative.    Eyes: Negative.    Respiratory: Negative.    Cardiovascular: Negative.    Gastrointestinal: Negative.    Genitourinary: Positive for dysuria (with UTI).        Vaginal burning and dryness   Musculoskeletal: Negative.    Skin: Negative.    Neurological: Negative.    Endo/Heme/Allergies: Negative.    Psychiatric/Behavioral: Negative.        PMHx:  Past Medical History:   Diagnosis Date    Arthritis     Bronchitis in child     Obesity     Sleep apnea     uses cpap       PSHx:  Past Surgical History:   Procedure Laterality Date     SECTION      LGA; shoulders too big.     COLONOSCOPY      COLONOSCOPY N/A 9/15/2020    Procedure: COLONOSCOPY;  Surgeon: Phan Chakraborty MD;  Location: [facility];  Service: Endoscopy;  Laterality: N/A;    HYSTERECTOMY      w BSO; pregnant in tube; then  when pregnant in ovary later had hysterectomy.        Fam Hx:   malignancies: Yes -  maternal grandmother kidney CA , gyn malignancies: No   kidney stones: No     Soc Hx:  , lives in [place]    Allergies:  Nitrofurantoin and Atorvastatin    Medications: reviewed     Objective:   There were no vitals filed for this visit.    Physical Exam  Constitutional:       Appearance: Normal appearance.   HENT:      Head: Normocephalic.      Mouth/Throat:      Mouth: Mucous membranes are moist.   Eyes:      Pupils: Pupils are equal, round, and reactive to light.   Pulmonary:      Effort: Pulmonary effort is normal.   Abdominal:      Palpations: Abdomen is soft.      Tenderness: There is no abdominal tenderness. There is no right CVA tenderness or left CVA tenderness.   Musculoskeletal:         General: Normal range of motion.      Cervical back: Normal range of motion and neck supple.   Skin:     General: Skin is warm and dry.   Neurological:      Mental Status: She is alert and oriented to person, place, and time.   Psychiatric:         Mood and Affect: Mood normal.         Behavior: Behavior normal.         LABS REVIEW:  UA today:   Color:Clear, Yellow  Spec. Grav.  1.010  PH  6.0  Negative for nitrates, protein, glucose, ketones, urobili, bili, and blood.  Positive for trace leuk    Assessment:       1. Recurrent UTI (urinary tract infection)    2. Suprapubic pressure    3. Dysuria    4. Atrophic vaginitis          Plan:     1.  Urine sent for culture, will call pt with results and address accordingly, pt verbalized understanding.  2. We discussed proper fluid intake but decreasing her intake in the evening to minimize her nightly interruptions. Pt verbalized understanding.   For your recurrent UTIs:  Behavioral changes to help decrease UTI recurrences   -increase water intake (6 to 8 bottles water per day)   -don't hold urine, void every 2 to 3 hours   -prevent constipation (miralax capful daily if necessary) to have a bowel movement daily and keep stools soft  -cut down on caffeine,drink mainly  water  -no douching   -void immediately after sexual intercourse  -wipe front to back     OTC meds to try (go to the pharmacist and ask where they are, they are over the counter)  -cranberry pills 500mg tabs TID, can buy over the counter  -take a probiotic daily designed for vaginal and urinary health  -D-Mannose once daily over the counter    IMPORTANT: If you feel like you have a UTI coming on, call our office and talk to one of the nurses. We will have you drop off a urine here in clinic or at one of our [facility] facilities. I do not typically like to write for antibiotics unless we have a urine culture. Avoid going to urgent care. We need to start documenting your urine cultures here in our office to see if they are really recurrent UTIs or sx of UTIs.     If you have fever and it doesn't improve with tylenol or ibuprofen or if you have flank pain associated with the uti symptoms go to the ER.     3. If you do continue to have positive urine cultures then we may proceed with doing a cystoscopy (to look in your bladder) and an imaging study.     F/u as needed    [facility]: Active    Jessica Monique, PATSY

## 2022-04-04 NOTE — PROGRESS NOTES
Health Maintenance Due   Topic Date Due    Hepatitis C Screening  Never done    HIV Screening  Never done    TETANUS VACCINE  Never done    Shingles Vaccine (1 of 2) Never done    COVID-19 Vaccine (3 - Booster for Pfizer series) 08/17/2021     Updates were requested from care everywhere.  Chart was reviewed for overdue [facility]active [facility] ([facility]) topics (CRS, Breast Cancer Screening, Eye exam)  Health Maintenance has been updated.  LINKS immunization registry triggered.  Immunizations were reconciled.

## 2022-04-05 ENCOUNTER — TELEPHONE (OUTPATIENT)
Dept: UROLOGY | Facility: CLINIC | Age: 62
End: 2022-04-05
Payer: COMMERCIAL

## 2022-04-05 NOTE — TELEPHONE ENCOUNTER
----- Message from Maximino Ding sent at 4/5/2022  9:14 AM CDT -----  Type: Needs Medical Advice  Who Called:  Patient      Best Call Back Number: [phone]  Additional Information: Patient states that she would like a callback regarding questions about her medications/.

## 2022-04-06 LAB — BACTERIA UR CULT: NORMAL

## 2022-04-18 DIAGNOSIS — F41.1 GENERALIZED ANXIETY DISORDER: ICD-10-CM

## 2022-04-18 DIAGNOSIS — F41.8 SITUATIONAL ANXIETY: ICD-10-CM

## 2022-04-18 DIAGNOSIS — F41.0 PANIC ATTACKS: ICD-10-CM

## 2022-04-18 DIAGNOSIS — G47.9 SLEEP DISORDER: ICD-10-CM

## 2022-04-18 NOTE — TELEPHONE ENCOUNTER
No new care gaps identified.  Powered by [employer]. Reference number: [id number].   4/18/2022 9:35:02 AM CDT

## 2022-04-19 NOTE — TELEPHONE ENCOUNTER
Refill Routing Note   Medication(s) are not appropriate for processing by [facility] for the following reason(s):      - Medication is a new start (<3 months)    [facility] action(s):  Defer          Medication reconciliation completed: No     Appointments  past 12m or future 3m with PCP    Date Provider   Last Visit   3/25/2022 Ernesto Mondragon MD   Next Visit   6/13/2022 Ernesto Mondragon MD   ED visits in past 90 days: 0        Note composed:7:17 PM 04/18/2022

## 2022-04-20 RX ORDER — TRAZODONE HYDROCHLORIDE 50 MG/1
50 TABLET ORAL NIGHTLY PRN
Qty: 30 TABLET | Refills: 1 | Status: SHIPPED | OUTPATIENT
Start: 2022-04-20 | End: 2022-05-05

## 2022-04-29 ENCOUNTER — PATIENT MESSAGE (OUTPATIENT)
Dept: CARDIOLOGY | Facility: CLINIC | Age: 62
End: 2022-04-29
Payer: COMMERCIAL

## 2022-05-02 ENCOUNTER — OFFICE VISIT (OUTPATIENT)
Dept: CARDIOLOGY | Facility: CLINIC | Age: 62
End: 2022-05-02
Payer: COMMERCIAL

## 2022-05-02 VITALS
HEIGHT: 63 IN | WEIGHT: 293 LBS | SYSTOLIC BLOOD PRESSURE: 146 MMHG | DIASTOLIC BLOOD PRESSURE: 77 MMHG | BODY MASS INDEX: 51.91 KG/M2 | HEART RATE: 85 BPM

## 2022-05-02 DIAGNOSIS — Z01.31 ENCOUNTER FOR EXAMINATION OF BLOOD PRESSURE WITH ABNORMAL FINDINGS: ICD-10-CM

## 2022-05-02 DIAGNOSIS — R07.9 CHEST PAIN, UNSPECIFIED TYPE: Primary | ICD-10-CM

## 2022-05-02 DIAGNOSIS — E78.49 OTHER HYPERLIPIDEMIA: ICD-10-CM

## 2022-05-02 DIAGNOSIS — R07.89 CHEST DISCOMFORT: ICD-10-CM

## 2022-05-02 DIAGNOSIS — G47.33 OSA ON CPAP: ICD-10-CM

## 2022-05-02 DIAGNOSIS — I10 ESSENTIAL HYPERTENSION: ICD-10-CM

## 2022-05-02 DIAGNOSIS — R07.89 ATYPICAL CHEST PAIN: ICD-10-CM

## 2022-05-02 PROCEDURE — 1160F PR REVIEW ALL MEDS BY PRESCRIBER/CLIN PHARMACIST DOCUMENTED: ICD-10-PCS | Mod: CPTII,S$GLB,, | Performed by: INTERNAL MEDICINE

## 2022-05-02 PROCEDURE — 3008F PR BODY MASS INDEX (BMI) DOCUMENTED: ICD-10-PCS | Mod: CPTII,S$GLB,, | Performed by: INTERNAL MEDICINE

## 2022-05-02 PROCEDURE — 3078F DIAST BP <80 MM HG: CPT | Mod: CPTII,S$GLB,, | Performed by: INTERNAL MEDICINE

## 2022-05-02 PROCEDURE — 99999 PR PBB SHADOW E&M-EST. PATIENT-LVL III: ICD-10-PCS | Mod: PBBFAC,,, | Performed by: INTERNAL MEDICINE

## 2022-05-02 PROCEDURE — 3077F PR MOST RECENT SYSTOLIC BLOOD PRESSURE >= 140 MM HG: ICD-10-PCS | Mod: CPTII,S$GLB,, | Performed by: INTERNAL MEDICINE

## 2022-05-02 PROCEDURE — 99999 PR PBB SHADOW E&M-EST. PATIENT-LVL III: CPT | Mod: PBBFAC,,, | Performed by: INTERNAL MEDICINE

## 2022-05-02 PROCEDURE — 3008F BODY MASS INDEX DOCD: CPT | Mod: CPTII,S$GLB,, | Performed by: INTERNAL MEDICINE

## 2022-05-02 PROCEDURE — 3077F SYST BP >= 140 MM HG: CPT | Mod: CPTII,S$GLB,, | Performed by: INTERNAL MEDICINE

## 2022-05-02 PROCEDURE — 3078F PR MOST RECENT DIASTOLIC BLOOD PRESSURE < 80 MM HG: ICD-10-PCS | Mod: CPTII,S$GLB,, | Performed by: INTERNAL MEDICINE

## 2022-05-02 PROCEDURE — 99204 OFFICE O/P NEW MOD 45 MIN: CPT | Mod: S$GLB,,, | Performed by: INTERNAL MEDICINE

## 2022-05-02 PROCEDURE — 1159F PR MEDICATION LIST DOCUMENTED IN MEDICAL RECORD: ICD-10-PCS | Mod: CPTII,S$GLB,, | Performed by: INTERNAL MEDICINE

## 2022-05-02 PROCEDURE — 1160F RVW MEDS BY RX/DR IN RCRD: CPT | Mod: CPTII,S$GLB,, | Performed by: INTERNAL MEDICINE

## 2022-05-02 PROCEDURE — 1159F MED LIST DOCD IN RCRD: CPT | Mod: CPTII,S$GLB,, | Performed by: INTERNAL MEDICINE

## 2022-05-02 PROCEDURE — 99204 PR OFFICE/OUTPT VISIT, NEW, LEVL IV, 45-59 MIN: ICD-10-PCS | Mod: S$GLB,,, | Performed by: INTERNAL MEDICINE

## 2022-05-02 NOTE — PROGRESS NOTES
Subjective:    Patient ID:  Concepcion Oneill is a 62 y.o. female who presents for evaluation of No chief complaint on file.      Problem List Items Addressed This Visit        Cardiac/Vascular    Essential hypertension    Other hyperlipidemia       Other    JUANCHO on CPAP      Other Visit Diagnoses     Chest pain, unspecified type    -  Primary    Chest discomfort              HPI    Referred by Dr. Mondragon    The patient states that she starting having issues with trouble breathing and aching in the chest. Breathing worse with exertion. Also placed on lasix daily which is helping tremendously.     Frequency of Lasix use - Daily  Motivated to start exercising    Personal history of heart attack or stroke - None that she is aware of  Family history of heart disease - None that she is aware of    Past Medical History:   Diagnosis Date    Arthritis     Bronchitis in child     Obesity     Sleep apnea     uses cpap       Past Surgical History:   Procedure Laterality Date     SECTION      LGA; shoulders too big.     COLONOSCOPY      COLONOSCOPY N/A 9/15/2020    Procedure: COLONOSCOPY;  Surgeon: Phan Chakraborty MD;  Location: [facility];  Service: Endoscopy;  Laterality: N/A;    HYSTERECTOMY      w BSO; pregnant in tube; then  when pregnant in ovary later had hysterectomy.        Family History   Problem Relation Age of Onset    Diabetes Sister     Heart disease Maternal Aunt     Breast cancer Maternal Aunt     Diabetes Sister     Cancer Other     Cancer Paternal Uncle         colon cancer       Social History     Socioeconomic History    Marital status:      Spouse name: Tavo    Number of children: 1   Occupational History    Occupation: Housewife.   Tobacco Use    Smoking status: Never Smoker    Smokeless tobacco: Never Used   Substance and Sexual Activity    Alcohol use: Not Currently     Comment: Just for Holidays    Drug use: Never    Sexual activity: Yes      "Partners: Male       Review of patient's allergies indicates:   Allergen Reactions    Nitrofurantoin      Sec dose led to cramps and nausea so was discontinued    Atorvastatin      Led to myalgias so was discontinued; not benefited much by Co Q10 supplementation       Review of Systems   Constitutional: Negative for decreased appetite, fever and malaise/fatigue.   Eyes: Negative for blurred vision.   Cardiovascular: Negative for chest pain, dyspnea on exertion, irregular heartbeat and leg swelling.   Respiratory: Negative for cough, hemoptysis, shortness of breath and wheezing.    Endocrine: Negative for cold intolerance and heat intolerance.   Hematologic/Lymphatic: Negative for bleeding problem.   Musculoskeletal: Negative for muscle weakness and myalgias.   Gastrointestinal: Negative for abdominal pain, constipation and diarrhea.   Genitourinary: Negative for bladder incontinence.   Neurological: Negative for dizziness and weakness.   Psychiatric/Behavioral: Negative for depression.        Objective:     Vitals:    05/02/22 1258   BP: (!) 146/77   BP Location: Right arm   Patient Position: Sitting   BP Method: Large (Automatic)   Pulse: 85   Weight: 133.4 kg (294 lb 1.5 oz)   Height: 5' 3" (1.6 m)        Physical Exam  Vitals and nursing note reviewed.   Constitutional:       General: She is not in acute distress.     Appearance: She is well-developed.   HENT:      Head: Normocephalic and atraumatic.   Neck:      Vascular: No JVD.   Cardiovascular:      Rate and Rhythm: Normal rate and regular rhythm.      Heart sounds: Normal heart sounds. No murmur heard.    No friction rub. No gallop.   Pulmonary:      Effort: Pulmonary effort is normal. No respiratory distress.      Breath sounds: Normal breath sounds. No wheezing or rales.   Abdominal:      General: Bowel sounds are normal.      Palpations: Abdomen is soft.      Tenderness: There is no abdominal tenderness. There is no guarding or rebound. "   Musculoskeletal:         General: No tenderness.      Cervical back: Neck supple.   Skin:     General: Skin is warm and dry.   Neurological:      Mental Status: She is alert and oriented to person, place, and time.   Psychiatric:         Behavior: Behavior normal.             Current Outpatient Medications on File Prior to Visit   Medication Sig    ascorbic acid, vitamin C, (VITAMIN C) 1000 MG tablet Take 1,000 mg by mouth once daily.    busPIRone (BUSPAR) 5 MG Tab 5-7.5 mg p.o. t.i.d. as needed for anxiety or panic attacks/chest discomfort.  Can use if needed for sleep 1 hour after trazodone.  Please provide generic    estradioL (ESTRACE) 0.01 % (0.1 mg/gram) vaginal cream Place 1 g vaginally 3 (three) times a week.    fluticasone propionate (FLONASE) 50 mcg/actuation nasal spray 1-2 sprays a day for congestion    furosemide (LASIX) 20 MG tablet Take 1 tablet (20 mg total) by mouth once daily. Generic    glucosamine HCl/chondroitin ambrose (GLUCOSAMINE-CHONDROITIN ORAL) Take by mouth 2 (two) times daily as needed.     loratadine (CLARITIN) 10 mg tablet TAKE 1 TABLET (10 MG TOTAL) BY MOUTH DAILY AS NEEDED FOR ALLERGIES (CONGESTION).    multivitamin with minerals tablet Take 1 tablet by mouth once daily.    omega-3 fatty acids/fish oil (FISH OIL OMEGA 3-6-9 ORAL) Take by mouth 2 (two) times a day.    pantoprazole (PROTONIX) 40 MG tablet 40 mg po daily as needed for reflux. (Patient taking differently: Take 40 mg by mouth daily as needed. for reflux.)    pravastatin (PRAVACHOL) 40 MG tablet TAKE 1 TABLET BY MOUTH EVERY DAY IN THE EVENING    sertraline (ZOLOFT) 50 MG tablet Take a half 50 mg tablet p.o. daily for 1 week then increase to 50 mg daily thereafter.  Please provide generic.    traZODone (DESYREL) 50 MG tablet TAKE 1 TABLET (50 MG TOTAL) BY MOUTH NIGHTLY AS NEEDED FOR INSOMNIA. PLEASE PROVIDE GENERIC    triamterene-hydrochlorothiazide 37.5-25 mg (DYAZIDE) 37.5-25 mg per capsule Take 1 capsule by  mouth every morning. Take every day at noon    ubidecarenone (COQ-10 ORAL) Take by mouth once daily.     No current facility-administered medications on file prior to visit.       Lipid Panel:   Lab Results   Component Value Date    CHOL 175 01/19/2022    HDL 49 01/19/2022    LDLCALC 105.2 01/19/2022    TRIG 104 01/19/2022    CHOLHDL 28.0 01/19/2022         The 10-year ASCVD risk score (Sandwichjc FUENTES Jr., et al., 2013) is: 7.6%    Values used to calculate the score:      Age: 62 years      Sex: Female      Is Non- : No      Diabetic: No      Tobacco smoker: No      Systolic Blood Pressure: 146 mmHg      Is BP treated: Yes      HDL Cholesterol: 49 mg/dL      Total Cholesterol: 175 mg/dL    All pertinent labs, imaging, and EKGs reviewed.  Patient's most recent EKG tracing was personally interpreted by this provider.    Assessment:       1. Chest pain, unspecified type    2. Essential hypertension    3. Other hyperlipidemia    4. Chest discomfort    5. JUANCHO on CPAP         Plan:     Symptoms of atypical chest pain  BP borderline today  Most recent echocardiogram reviewed personally     Echocardiogram   Nuclear stress test   Continue Lasix 20mg PO Daily  Weight loss discussed    Continue other cardiac medications  Mediterranean Diet/Cardiovascular Exercise Program    Patient queried and all questions were answered.    F/u in 6 months to reassess weight      Signed:    Benja Hazel MD  5/2/2022 7:47 AM

## 2022-05-23 ENCOUNTER — TELEPHONE (OUTPATIENT)
Dept: DERMATOLOGY | Facility: CLINIC | Age: 62
End: 2022-05-23
Payer: COMMERCIAL

## 2022-05-23 NOTE — TELEPHONE ENCOUNTER
----- Message from Fiordaliza Chaney sent at 5/23/2022  8:58 AM CDT -----  Contact: self  Patient has an appt on Friday 5/27/2022 and she has a virus so she wants to know if you want to reschedule her biopsy inside her nose and she is on medicine but should be finished by Friday. Call back to advise at. [phone] (home) and thanks

## 2022-05-26 ENCOUNTER — TELEPHONE (OUTPATIENT)
Dept: DERMATOLOGY | Facility: CLINIC | Age: 62
End: 2022-05-26
Payer: COMMERCIAL

## 2022-05-26 NOTE — TELEPHONE ENCOUNTER
----- Message from Charliasmita Freddie sent at 5/26/2022  8:12 AM CDT -----  Contact: pt at [phone]  Type: Needs Medical Advice  Who Called:  pt  Best Call Back Number: [phone]  Additional Information: pt is calling the office to have her appt on 5/27/22 rescheduled as she isnt feeling well. Please call back and advise.

## 2022-05-27 ENCOUNTER — TELEPHONE (OUTPATIENT)
Dept: CARDIOLOGY | Facility: CLINIC | Age: 62
End: 2022-05-27
Payer: COMMERCIAL

## 2022-05-27 NOTE — TELEPHONE ENCOUNTER
----- Message from Lorri Henry, Patient Care Assistant sent at 5/27/2022  8:37 AM CDT -----  Regarding: appointment  Type: Needs Medical Advice  Who Called:  pt   Best Call Back Number: [phone] (home)     Additional Information: pt states she would like a callback regarding rescheduling her appointment 5/30/22. Thanks!

## 2022-06-06 ENCOUNTER — OFFICE VISIT (OUTPATIENT)
Dept: DERMATOLOGY | Facility: CLINIC | Age: 62
End: 2022-06-06
Payer: COMMERCIAL

## 2022-06-06 VITALS — BODY MASS INDEX: 51.91 KG/M2 | WEIGHT: 293 LBS | RESPIRATION RATE: 18 BRPM | HEIGHT: 63 IN

## 2022-06-06 DIAGNOSIS — J34.89 NASAL SEPTAL DEFECT: Primary | ICD-10-CM

## 2022-06-06 PROCEDURE — 1160F RVW MEDS BY RX/DR IN RCRD: CPT | Mod: CPTII,S$GLB,, | Performed by: STUDENT IN AN ORGANIZED HEALTH CARE EDUCATION/TRAINING PROGRAM

## 2022-06-06 PROCEDURE — 3008F PR BODY MASS INDEX (BMI) DOCUMENTED: ICD-10-PCS | Mod: CPTII,S$GLB,, | Performed by: STUDENT IN AN ORGANIZED HEALTH CARE EDUCATION/TRAINING PROGRAM

## 2022-06-06 PROCEDURE — 1159F PR MEDICATION LIST DOCUMENTED IN MEDICAL RECORD: ICD-10-PCS | Mod: CPTII,S$GLB,, | Performed by: STUDENT IN AN ORGANIZED HEALTH CARE EDUCATION/TRAINING PROGRAM

## 2022-06-06 PROCEDURE — 99213 PR OFFICE/OUTPT VISIT, EST, LEVL III, 20-29 MIN: ICD-10-PCS | Mod: S$GLB,,, | Performed by: STUDENT IN AN ORGANIZED HEALTH CARE EDUCATION/TRAINING PROGRAM

## 2022-06-06 PROCEDURE — 1160F PR REVIEW ALL MEDS BY PRESCRIBER/CLIN PHARMACIST DOCUMENTED: ICD-10-PCS | Mod: CPTII,S$GLB,, | Performed by: STUDENT IN AN ORGANIZED HEALTH CARE EDUCATION/TRAINING PROGRAM

## 2022-06-06 PROCEDURE — 99999 PR PBB SHADOW E&M-EST. PATIENT-LVL III: ICD-10-PCS | Mod: PBBFAC,,, | Performed by: STUDENT IN AN ORGANIZED HEALTH CARE EDUCATION/TRAINING PROGRAM

## 2022-06-06 PROCEDURE — 99999 PR PBB SHADOW E&M-EST. PATIENT-LVL III: CPT | Mod: PBBFAC,,, | Performed by: STUDENT IN AN ORGANIZED HEALTH CARE EDUCATION/TRAINING PROGRAM

## 2022-06-06 PROCEDURE — 1159F MED LIST DOCD IN RCRD: CPT | Mod: CPTII,S$GLB,, | Performed by: STUDENT IN AN ORGANIZED HEALTH CARE EDUCATION/TRAINING PROGRAM

## 2022-06-06 PROCEDURE — 99213 OFFICE O/P EST LOW 20 MIN: CPT | Mod: S$GLB,,, | Performed by: STUDENT IN AN ORGANIZED HEALTH CARE EDUCATION/TRAINING PROGRAM

## 2022-06-06 PROCEDURE — 3008F BODY MASS INDEX DOCD: CPT | Mod: CPTII,S$GLB,, | Performed by: STUDENT IN AN ORGANIZED HEALTH CARE EDUCATION/TRAINING PROGRAM

## 2022-06-06 NOTE — PROGRESS NOTES
Subjective:       Patient ID:  Concepcion Oneill is a 62 y.o. female who presents for   Chief Complaint   Patient presents with    Lesion     Patient present for lesion. LOV 3/18/21 by TP.     Referred by ENT PA with c/f bumps inside her nose X years that come and go. She notes thatwhen the pop up, they burn. Has trialed mupirocin in past without improvement. Denies any fmhx ENT malignancy, denies fevers, chills, night sweats, unintentional weight loss, swollen LNs.    no Phx of NMSC.  yes Fhx of melanoma.  + brother     Yes fhx of breast cancer  + maternal great aunt     No fhx of pancreatic cancer.    Past Medical History:  No date: Arthritis  No date: Bronchitis in child  No date: Obesity  No date: Sleep apnea      Comment:  uses cpap        Review of Systems   Constitutional: Negative for fever, chills, weight loss, fatigue, night sweats and malaise.   HENT: Negative for headaches.    Respiratory: Negative for cough and shortness of breath.    Gastrointestinal: Negative for indigestion.   Skin: Positive for activity-related sunscreen use. Negative for daily sunscreen use, recent sunburn and wears hat.   Neurological: Negative for headaches.   Hematologic/Lymphatic: Negative for adenopathy. Does not bruise/bleed easily.        Objective:    Physical Exam   Constitutional: She appears well-developed and well-nourished. No distress.   HENT:   Nose:       Neurological: She is alert and oriented to person, place, and time. She is not disoriented.   Psychiatric: She has a normal mood and affect.   Skin:   Areas Examined (abnormalities noted in diagram):   Scalp / Hair Palpated and Inspected  Head / Face Inspection Performed  Neck Inspection Performed  Chest / Axilla Inspection Performed  Abdomen Inspection Performed  Genitals / Buttocks / Groin Inspection Performed  Back Inspection Performed  RUE Inspected  LUE Inspection Performed  RLE Inspected  LLE Inspection Performed  Nails and Digits Inspection Performed               Diagram Legend     Erythematous scaling macule/papule c/w actinic keratosis       Vascular papule c/w angioma      Pigmented verrucoid papule/plaque c/w seborrheic keratosis      Yellow umbilicated papule c/w sebaceous hyperplasia      Irregularly shaped tan macule c/w lentigo     1-2 mm smooth white papules consistent with Milia      Movable subcutaneous cyst with punctum c/w epidermal inclusion cyst      Subcutaneous movable cyst c/w pilar cyst      Firm pink to brown papule c/w dermatofibroma      Pedunculated fleshy papule(s) c/w skin tag(s)      Evenly pigmented macule c/w junctional nevus     Mildly variegated pigmented, slightly irregular-bordered macule c/w mildly atypical nevus      Flesh colored to evenly pigmented papule c/w intradermal nevus       Pink pearly papule/plaque c/w basal cell carcinoma      Erythematous hyperkeratotic cursted plaque c/w SCC      Surgical scar with no sign of skin cancer recurrence      Open and closed comedones      Inflammatory papules and pustules      Verrucoid papule consistent consistent with wart     Erythematous eczematous patches and plaques     Dystrophic onycholytic nail with subungual debris c/w onychomycosis     Umbilicated papule    Erythematous-base heme-crusted tan verrucoid plaque consistent with inflamed seborrheic keratosis     Erythematous Silvery Scaling Plaque c/w Psoriasis     See annotation      Assessment / Plan:        Nasal septal defect  No e/o changes on exam and magnified inspection today of nasal vestibule.  - Discussed and reassured about no evidence of gross malignancy  - Will trial 1 mo Flonase daily  - If pt with continued burning/papules, will refer to Dr. Whitman specifically for further inspection of nasal septum.           No follow-ups on file.

## 2022-06-13 ENCOUNTER — OFFICE VISIT (OUTPATIENT)
Dept: FAMILY MEDICINE | Facility: CLINIC | Age: 62
End: 2022-06-13
Payer: COMMERCIAL

## 2022-06-13 VITALS
OXYGEN SATURATION: 99 % | DIASTOLIC BLOOD PRESSURE: 66 MMHG | BODY MASS INDEX: 50.02 KG/M2 | HEART RATE: 66 BPM | WEIGHT: 282.31 LBS | SYSTOLIC BLOOD PRESSURE: 124 MMHG | HEIGHT: 63 IN

## 2022-06-13 DIAGNOSIS — D75.89 MACROCYTOSIS: ICD-10-CM

## 2022-06-13 DIAGNOSIS — Z01.89 ENCOUNTER FOR LABORATORY TEST: ICD-10-CM

## 2022-06-13 DIAGNOSIS — R06.09 DYSPNEA ON EXERTION: ICD-10-CM

## 2022-06-13 DIAGNOSIS — G47.33 OSA ON CPAP: ICD-10-CM

## 2022-06-13 DIAGNOSIS — E66.01 MORBID OBESITY: ICD-10-CM

## 2022-06-13 DIAGNOSIS — I10 ESSENTIAL HYPERTENSION: Primary | ICD-10-CM

## 2022-06-13 DIAGNOSIS — R07.89 CHEST DISCOMFORT: ICD-10-CM

## 2022-06-13 DIAGNOSIS — Z83.3 FAMILY HISTORY OF DIABETES MELLITUS IN MOTHER: ICD-10-CM

## 2022-06-13 DIAGNOSIS — F41.9 ANXIETY: ICD-10-CM

## 2022-06-13 DIAGNOSIS — G47.9 SLEEP DISORDER: ICD-10-CM

## 2022-06-13 DIAGNOSIS — F41.8 SITUATIONAL ANXIETY: ICD-10-CM

## 2022-06-13 DIAGNOSIS — F41.0 PANIC ATTACKS: ICD-10-CM

## 2022-06-13 DIAGNOSIS — R60.0 BILATERAL LOWER EXTREMITY EDEMA: ICD-10-CM

## 2022-06-13 DIAGNOSIS — E78.5 DYSLIPIDEMIA: ICD-10-CM

## 2022-06-13 DIAGNOSIS — E78.49 OTHER HYPERLIPIDEMIA: ICD-10-CM

## 2022-06-13 PROCEDURE — 3074F PR MOST RECENT SYSTOLIC BLOOD PRESSURE < 130 MM HG: ICD-10-PCS | Mod: CPTII,S$GLB,, | Performed by: INTERNAL MEDICINE

## 2022-06-13 PROCEDURE — 99999 PR PBB SHADOW E&M-EST. PATIENT-LVL III: CPT | Mod: PBBFAC,,, | Performed by: INTERNAL MEDICINE

## 2022-06-13 PROCEDURE — 99999 PR PBB SHADOW E&M-EST. PATIENT-LVL III: ICD-10-PCS | Mod: PBBFAC,,, | Performed by: INTERNAL MEDICINE

## 2022-06-13 PROCEDURE — 3044F HG A1C LEVEL LT 7.0%: CPT | Mod: CPTII,S$GLB,, | Performed by: INTERNAL MEDICINE

## 2022-06-13 PROCEDURE — 99214 PR OFFICE/OUTPT VISIT, EST, LEVL IV, 30-39 MIN: ICD-10-PCS | Mod: S$GLB,,, | Performed by: INTERNAL MEDICINE

## 2022-06-13 PROCEDURE — 3074F SYST BP LT 130 MM HG: CPT | Mod: CPTII,S$GLB,, | Performed by: INTERNAL MEDICINE

## 2022-06-13 PROCEDURE — 99214 OFFICE O/P EST MOD 30 MIN: CPT | Mod: S$GLB,,, | Performed by: INTERNAL MEDICINE

## 2022-06-13 PROCEDURE — 3044F PR MOST RECENT HEMOGLOBIN A1C LEVEL <7.0%: ICD-10-PCS | Mod: CPTII,S$GLB,, | Performed by: INTERNAL MEDICINE

## 2022-06-13 PROCEDURE — 3078F PR MOST RECENT DIASTOLIC BLOOD PRESSURE < 80 MM HG: ICD-10-PCS | Mod: CPTII,S$GLB,, | Performed by: INTERNAL MEDICINE

## 2022-06-13 PROCEDURE — 3078F DIAST BP <80 MM HG: CPT | Mod: CPTII,S$GLB,, | Performed by: INTERNAL MEDICINE

## 2022-06-13 PROCEDURE — 3008F PR BODY MASS INDEX (BMI) DOCUMENTED: ICD-10-PCS | Mod: CPTII,S$GLB,, | Performed by: INTERNAL MEDICINE

## 2022-06-13 PROCEDURE — 3008F BODY MASS INDEX DOCD: CPT | Mod: CPTII,S$GLB,, | Performed by: INTERNAL MEDICINE

## 2022-06-13 RX ORDER — PSEUDOEPHED/CODEINE/TRIPROLIDN 30-10-1.25
1 SYRUP ORAL DAILY
COMMUNITY

## 2022-06-13 RX ORDER — CHOLECALCIFEROL (VITAMIN D3) 25 MCG
1000 TABLET ORAL DAILY
COMMUNITY

## 2022-06-13 RX ORDER — POTASSIUM &MAGNESIUM ASPARTATE 250-250 MG
CAPSULE ORAL DAILY
COMMUNITY

## 2022-06-13 RX ORDER — ASPIRIN 81 MG/1
81 TABLET ORAL DAILY
COMMUNITY

## 2022-06-13 RX ORDER — BUSPIRONE HYDROCHLORIDE 5 MG/1
TABLET ORAL
Qty: 30 TABLET | Refills: 2 | Status: SHIPPED | OUTPATIENT
Start: 2022-06-13 | End: 2022-07-01

## 2022-06-13 NOTE — PATIENT INSTRUCTIONS
Essential hypertension; Maintain < 2 Gm Na a day diet, and monitor BP at home; keep a log.  -     Lipid Panel; Future; Expected date: 06/13/2022  -     Magnesium; Future; Expected date: 06/13/2022  -     Comprehensive Metabolic Panel; Future; Expected date: 06/13/2022  -     CBC Auto Differential; Future; Expected date: 06/13/2022  -     T4, Free; Future; Expected date: 06/13/2022  -     TSH; Future; Expected date: 06/13/2022    Other hyperlipidemia: Maintain low fat high fiber diet, exercise regularly. Weight reduction where indicated. Cont pravastatin.      JUANCHO on CPAP; Uses CPAP nightly and helps.  Weight reduction where indicated.    Dyslipidemia: Maintain low fat high fiber diet, exercise regularly. Weight reduction where indicated.    Morbid obesity; Caloric restriction w regular exercise and weight reduction.  -     Lipid Panel; Future; Expected date: 06/13/2022  -     Comprehensive Metabolic Panel; Future; Expected date: 06/13/2022  -     T4, Free; Future; Expected date: 06/13/2022  -     TSH; Future; Expected date: 06/13/2022  -     Hemoglobin A1C; Future; Expected date: 06/13/2022    Family history of diabetes mellitus in mother  -     Comprehensive Metabolic Panel; Future; Expected date: 06/13/2022  -     Hemoglobin A1C; Future; Expected date: 06/13/2022    Macrocytosis: Women's 50+ one a day.   -     CBC Auto Differential; Future; Expected date: 06/13/2022    Bilateral lower extremity edema; Maintain less than 2 g sodium diet; elevate lower extremities at home; use compression stockings if possible. Cont dyazide-25 one a day.     Encounter for laboratory test    Dyspnea on [id number]: seems to have cleared w buspar and dyazide-25.     Panic attacks/chest discomfort; seems to have cleared as well; for echo and EST w card Dr. Rivas

## 2022-06-13 NOTE — PROGRESS NOTES
Subjective:       Patient ID: Concepcion Oneill is a 62 y.o. female.    Chief Complaint: Follow-up    HPI Pt here for reassessment and go over lab results.  Essential hypertension; Maintain < 2 Gm Na a day diet, and monitor BP at home; keep a log.  Here today blood pressure is controlled 124/66 with pulse 66.   -     Lipid Panel; Future; Expected date: 06/13/2022  -     Magnesium; Future; Expected date: 06/13/2022  -     Comprehensive Metabolic Panel; Future; Expected date: 06/13/2022  -     CBC Auto Differential; Future; Expected date: 06/13/2022  -     T4, Free; Future; Expected date: 06/13/2022  -     TSH; Future; Expected date: 06/13/2022  Other hyperlipidemia: Maintain low fat high fiber diet, exercise regularly. Weight reduction where indicated. Cont pravastatin.   JUANCHO on CPAP; Uses CPAP nightly and helps.  Weight reduction where indicated.  Dyslipidemia: Maintain low fat high fiber diet, exercise regularly. Weight reduction where indicated.  Morbid obesity; Caloric restriction w regular exercise and weight reduction.  -     Lipid Panel; Future; Expected date: 06/13/2022  -     Comprehensive Metabolic Panel; Future; Expected date: 06/13/2022  -     T4, Free; Future; Expected date: 06/13/2022  -     TSH; Future; Expected date: 06/13/2022  -     Hemoglobin A1C; Future; Expected date: 06/13/2022  Family history of diabetes mellitus in mother  -     Comprehensive Metabolic Panel; Future; Expected date: 06/13/2022  -     Hemoglobin A1C; Future; Expected date: 06/13/2022  Macrocytosis: Women's 50+ one a day.   -     CBC Auto Differential; Future; Expected date: 06/13/2022  Bilateral lower extremity edema; Maintain less than 2 g sodium diet; elevate lower extremities at home; use compression stockings if possible. Cont dyazide-25 one a day.   Encounter for laboratory test:  Labs reviewed with patient and ordered for follow-up.  Dyspnea on exertion: seems to have cleared w buspar and dyazide-25.   Panic attacks/chest  "discomfort; seems to have cleared as well; for echo and EST w card Dr.  Total time:  11:12 a.m. through 11:50 a.m..  Greater than 50% time spent in discussion, counseling, and review.  Labs reviewed and discussed with patient and ordered for follow-up.  Medications also reviewed and addressed.  Various different diagnosis is discussed with patient at length as well as plan of care.      Review of Systems   Constitutional: Negative for appetite change and fever.   HENT: Negative for congestion, postnasal drip, rhinorrhea and sinus pressure.    Eyes: Negative for discharge and itching.   Respiratory: Negative for cough, chest tightness, shortness of breath and wheezing.    Cardiovascular: Negative for chest pain, palpitations and leg swelling.   Gastrointestinal: Negative for abdominal distention, abdominal pain, blood in stool, constipation, diarrhea, nausea and vomiting.   Endocrine: Negative for polydipsia, polyphagia and polyuria.   Genitourinary: Negative for dysuria and hematuria.   Musculoskeletal: Negative for arthralgias and myalgias.   Skin: Negative for rash.   Allergic/Immunologic: Negative for environmental allergies and food allergies.   Neurological: Negative for tremors, seizures and syncope.   Hematological: Negative for adenopathy. Does not bruise/bleed easily.   Psychiatric/Behavioral: Negative for dysphoric mood. The patient is not nervous/anxious.       Objective:        Vitals:    06/13/22 1044   BP: 124/66   Pulse: 66   SpO2: 99%   Weight: 128 kg (282 lb 4.8 oz)   Height: 5' 3" (1.6 m)       BMI Readings from Last 3 Encounters:   06/13/22 50.01 kg/m²   06/06/22 52.10 kg/m²   05/02/22 52.10 kg/m²        Wt Readings from Last 3 Encounters:   06/13/22 1044 128 kg (282 lb 4.8 oz)   06/06/22 0836 133.4 kg (294 lb 1.5 oz)   05/02/22 1258 133.4 kg (294 lb 1.5 oz)        BP Readings from Last 3 Encounters:   06/13/22 124/66   05/02/22 (!) 146/77   03/25/22 124/86        There are no preventive care " reminders to display for this patient.     Health Maintenance Due   Topic Date Due    Hepatitis C Screening  Never done    HIV Screening  Never done    TETANUS VACCINE  Never done    Shingles Vaccine (1 of 2) Never done    COVID-19 Vaccine (3 - Booster for Pfizer series) 2021         Past Medical History:   Diagnosis Date    Arthritis     Bronchitis in child     Obesity     Sleep apnea     uses cpap       Past Surgical History:   Procedure Laterality Date     SECTION      LGA; shoulders too big.     COLONOSCOPY      COLONOSCOPY N/A 9/15/2020    Procedure: COLONOSCOPY;  Surgeon: Phan Chakraborty MD;  Location: [facility];  Service: Endoscopy;  Laterality: N/A;    HYSTERECTOMY      w BSO; pregnant in tube; then  when pregnant in ovary later had hysterectomy.        Social History     Tobacco Use    Smoking status: Never Smoker    Smokeless tobacco: Never Used   Substance Use Topics    Alcohol use: Not Currently     Comment: Just for Holidays    Drug use: Never       Family History   Problem Relation Age of Onset    Diabetes Sister     Heart disease Maternal Aunt     Breast cancer Maternal Aunt     Diabetes Sister     Cancer Other     Cancer Paternal Uncle         colon cancer       Review of patient's allergies indicates:   Allergen Reactions    Nitrofurantoin      Sec dose led to cramps and nausea so was discontinued    Atorvastatin      Led to myalgias so was discontinued; not benefited much by Co Q10 supplementation       Current Outpatient Medications on File Prior to Visit   Medication Sig Dispense Refill    ascorbic acid, vitamin C, (VITAMIN C) 1000 MG tablet Take 1,000 mg by mouth once daily.      aspirin (ECOTRIN) 81 MG EC tablet Take 81 mg by mouth once daily.      calcium carbonate 650 mg calcium (1,625 mg) tablet Take 1 tablet by mouth once daily.      cranberry 500 mg Cap Take by mouth.      fluticasone propionate (FLONASE) 50 mcg/actuation nasal  spray 1-2 sprays a day for congestion 16 g 2    furosemide (LASIX) 20 MG tablet Take 1 tablet (20 mg total) by mouth once daily. Generic 30 tablet 2    glucosamine HCl/chondroitin ambrose (GLUCOSAMINE-CHONDROITIN ORAL) Take by mouth 2 (two) times daily as needed.       Lactobacillus rhamnosus GG (CULTURELLE) 10 billion cell capsule Take 1 capsule by mouth once daily.      multivitamin with minerals tablet Take 1 tablet by mouth once daily.      omega-3 fatty acids/fish oil (FISH OIL OMEGA 3-6-9 ORAL) Take by mouth 2 (two) times a day.      pantoprazole (PROTONIX) 40 MG tablet 40 mg po daily as needed for reflux. (Patient taking differently: Take 40 mg by mouth daily as needed. for reflux.) 90 tablet 1    pravastatin (PRAVACHOL) 40 MG tablet TAKE 1 TABLET BY MOUTH EVERY DAY IN THE EVENING 90 tablet 1    triamterene-hydrochlorothiazide 37.5-25 mg (DYAZIDE) 37.5-25 mg per capsule Take 1 capsule by mouth every morning. Take every day at noon 90 capsule 3    ubidecarenone (COQ-10 ORAL) Take by mouth once daily.      vitamin D (VITAMIN D3) 1000 units Tab Take 1,000 Units by mouth once daily.      [DISCONTINUED] busPIRone (BUSPAR) 5 MG Tab 5-7.5 mg p.o. t.i.d. as needed for anxiety or panic attacks/chest discomfort.  Can use if needed for sleep 1 hour after trazodone.  Please provide generic 30 tablet 1    estradioL (ESTRACE) 0.01 % (0.1 mg/gram) vaginal cream Place 1 g vaginally 3 (three) times a week. (Patient not taking: Reported on 6/13/2022) 12 g 6    loratadine (CLARITIN) 10 mg tablet TAKE 1 TABLET (10 MG TOTAL) BY MOUTH DAILY AS NEEDED FOR ALLERGIES (CONGESTION). (Patient not taking: Reported on 6/13/2022) 90 tablet 1    sertraline (ZOLOFT) 50 MG tablet Take a half 50 mg tablet p.o. daily for 1 week then increase to 50 mg daily thereafter.  Please provide generic. (Patient not taking: Reported on 6/13/2022) 30 tablet 2    traZODone (DESYREL) 50 MG tablet TAKE 1 TABLET (50 MG TOTAL) BY MOUTH NIGHTLY AS  NEEDED FOR INSOMNIA. PLEASE PROVIDE GENERIC (Patient not taking: Reported on 6/13/2022) 90 tablet 0     No current facility-administered medications on file prior to visit.       Physical Exam  Vitals reviewed.   Constitutional:       Appearance: She is well-developed.   HENT:      Head: Normocephalic and atraumatic.   Neck:      Thyroid: No thyromegaly.   Cardiovascular:      Rate and Rhythm: Normal rate and regular rhythm.      Heart sounds: Normal heart sounds. No murmur heard.    No gallop.   Pulmonary:      Effort: Pulmonary effort is normal. No respiratory distress.      Breath sounds: Normal breath sounds. No wheezing or rales.   Abdominal:      General: Bowel sounds are normal. There is no distension.      Palpations: Abdomen is soft.      Tenderness: There is no abdominal tenderness. There is no guarding or rebound.   Musculoskeletal:         General: Normal range of motion.      Cervical back: Normal range of motion and neck supple.      Right lower leg: Edema present.      Left lower leg: Edema present.      Comments: Lower pretib at 2+ w calfs at 3+ edema w no calf tenderness to palp.    Lymphadenopathy:      Cervical: No cervical adenopathy.   Skin:     Findings: No rash.   Neurological:      Mental Status: She is alert and oriented to person, place, and time.      Comments: Moves all 4 extremities fine.   Psychiatric:         Behavior: Behavior normal.         Thought Content: Thought content normal.         Assessment:       1. Essential hypertension    2. Other hyperlipidemia    3. JUANCHO on CPAP    4. Dyslipidemia    5. Morbid obesity    6. Family history of diabetes mellitus in mother    7. Macrocytosis    8. Bilateral lower extremity edema    9. Encounter for laboratory test    10. Anxiety    11. Situational anxiety    12. Sleep disorder    13. Dyspnea on exertion    14. Panic attacks    15. Chest discomfort        Plan:       Essential hypertension; Maintain < 2 Gm Na a day diet, and monitor BP at  home; keep a log.  Here today blood pressure is controlled 124/66 with pulse 66.   -     Lipid Panel; Future; Expected date: 06/13/2022  -     Magnesium; Future; Expected date: 06/13/2022  -     Comprehensive Metabolic Panel; Future; Expected date: 06/13/2022  -     CBC Auto Differential; Future; Expected date: 06/13/2022  -     T4, Free; Future; Expected date: 06/13/2022  -     TSH; Future; Expected date: 06/13/2022    Other hyperlipidemia: Maintain low fat high fiber diet, exercise regularly. Weight reduction where indicated. Cont pravastatin.      JUANCHO on CPAP; Uses CPAP nightly and helps.  Weight reduction attempts to continue    Dyslipidemia: Maintain low fat high fiber diet, exercise regularly. Weight reduction where indicated.    Morbid obesity; Caloric restriction w regular exercise and weight reduction.  Adherence to his diet above  -     Lipid Panel; Future; Expected date: 06/13/2022  -     Comprehensive Metabolic Panel; Future; Expected date: 06/13/2022  -     T4, Free; Future; Expected date: 06/13/2022  -     TSH; Future; Expected date: 06/13/2022  -     Hemoglobin A1C; Future; Expected date: 06/13/2022    Family history of diabetes mellitus in mother  -     Comprehensive Metabolic Panel; Future; Expected date: 06/13/2022  -     Hemoglobin A1C; Future; Expected date: 06/13/2022    Macrocytosis: Women's 50+ one a day.  Limit any alcohol intake  -     CBC Auto Differential; Future; Expected date: 06/13/2022    Bilateral lower extremity edema; Maintain less than 2 g sodium diet; elevate lower extremities at home; use compression stockings if possible. Cont dyazide-25 one a day.     Encounter for laboratory test:  Labs reviewed and discussed with patient at length in ordered for follow-up    Dyspnea on [id number]: seems to have cleared w buspar and dyazide-25.     Panic attacks/chest discomfort; seems to have cleared as well; for echo and EST w card Dr. Rivas; keep follow-up with a cardiologist.

## 2022-06-14 DIAGNOSIS — F41.1 GENERALIZED ANXIETY DISORDER: ICD-10-CM

## 2022-06-14 DIAGNOSIS — E66.01 MORBID OBESITY WITH BMI OF 50.0-59.9, ADULT: ICD-10-CM

## 2022-06-14 DIAGNOSIS — F41.8 SITUATIONAL ANXIETY: ICD-10-CM

## 2022-06-14 DIAGNOSIS — R60.0 BILATERAL LOWER EXTREMITY EDEMA: ICD-10-CM

## 2022-06-14 DIAGNOSIS — F41.0 PANIC ATTACKS: ICD-10-CM

## 2022-06-14 DIAGNOSIS — G47.9 SLEEP DISORDER: ICD-10-CM

## 2022-06-14 DIAGNOSIS — R07.89 CHEST DISCOMFORT: ICD-10-CM

## 2022-06-14 NOTE — TELEPHONE ENCOUNTER
Refill Routing Note   Medication(s) are not appropriate for processing by [facility] for the following reason(s):      - Medication is a new start (<3 months)    [facility] action(s):  Defer          Medication reconciliation completed: No     Appointments  past 12m or future 3m with PCP    Date Provider   Last Visit   6/13/2022 Ernesto Mondragon MD   Next Visit   9/13/2022 Ernesto Mondragon MD   ED visits in past 90 days: 0        Note composed:4:32 PM 06/14/2022

## 2022-06-14 NOTE — TELEPHONE ENCOUNTER
No new care gaps identified.  [facility] Embedded Care Gaps. Reference number: [id number]. 6/14/2022   9:57:09 AM LAURELT

## 2022-06-16 ENCOUNTER — TELEPHONE (OUTPATIENT)
Dept: FAMILY MEDICINE | Facility: CLINIC | Age: 62
End: 2022-06-16
Payer: COMMERCIAL

## 2022-06-16 NOTE — TELEPHONE ENCOUNTER
----- Message from Marilela Herrera sent at 6/16/2022  9:59 AM CDT -----  Type:  RX Refill Request    Who Called: Pt    Refill or New Rx:Refill     RX Name and Strength:furosemide (LASIX) 20 MG tablet  sertraline (ZOLOFT) 50 MG tablet    Preferred Pharmacy with phone number:[id number] - [facility] [address] [address]    Local or Mail Order:Local     Ordering Provider:Ernesto Mondragon MD    Best Call Back Number:[phone]

## 2022-06-17 ENCOUNTER — TELEPHONE (OUTPATIENT)
Dept: FAMILY MEDICINE | Facility: CLINIC | Age: 62
End: 2022-06-17
Payer: COMMERCIAL

## 2022-06-17 ENCOUNTER — LAB VISIT (OUTPATIENT)
Dept: LAB | Facility: HOSPITAL | Age: 62
End: 2022-06-17
Attending: INTERNAL MEDICINE
Payer: COMMERCIAL

## 2022-06-17 DIAGNOSIS — E66.01 MORBID OBESITY: ICD-10-CM

## 2022-06-17 DIAGNOSIS — I10 ESSENTIAL HYPERTENSION: ICD-10-CM

## 2022-06-17 DIAGNOSIS — Z83.3 FAMILY HISTORY OF DIABETES MELLITUS IN MOTHER: ICD-10-CM

## 2022-06-17 DIAGNOSIS — D75.89 MACROCYTOSIS: ICD-10-CM

## 2022-06-17 LAB
ALBUMIN SERPL BCP-MCNC: 3.9 G/DL (ref 3.5–5.2)
ALP SERPL-CCNC: 51 U/L (ref 55–135)
ALT SERPL W/O P-5'-P-CCNC: 29 U/L (ref 10–44)
ANION GAP SERPL CALC-SCNC: 11 MMOL/L (ref 8–16)
AST SERPL-CCNC: 23 U/L (ref 10–40)
BASOPHILS # BLD AUTO: 0.03 K/UL (ref 0–0.2)
BASOPHILS NFR BLD: 0.5 % (ref 0–1.9)
BILIRUB SERPL-MCNC: 0.5 MG/DL (ref 0.1–1)
BUN SERPL-MCNC: 19 MG/DL (ref 8–23)
CALCIUM SERPL-MCNC: 9.2 MG/DL (ref 8.7–10.5)
CHLORIDE SERPL-SCNC: 105 MMOL/L (ref 95–110)
CHOLEST SERPL-MCNC: 165 MG/DL (ref 120–199)
CHOLEST/HDLC SERPL: 3.6 {RATIO} (ref 2–5)
CO2 SERPL-SCNC: 25 MMOL/L (ref 23–29)
CREAT SERPL-MCNC: 0.9 MG/DL (ref 0.5–1.4)
DIFFERENTIAL METHOD: ABNORMAL
EOSINOPHIL # BLD AUTO: 0.1 K/UL (ref 0–0.5)
EOSINOPHIL NFR BLD: 2.1 % (ref 0–8)
ERYTHROCYTE [DISTWIDTH] IN BLOOD BY AUTOMATED COUNT: 14.2 % (ref 11.5–14.5)
EST. GFR  (AFRICAN AMERICAN): >60 ML/MIN/1.73 M^2
EST. GFR  (NON AFRICAN AMERICAN): >60 ML/MIN/1.73 M^2
ESTIMATED AVG GLUCOSE: 105 MG/DL (ref 68–131)
GLUCOSE SERPL-MCNC: 96 MG/DL (ref 70–110)
HBA1C MFR BLD: 5.3 % (ref 4–5.6)
HCT VFR BLD AUTO: 42.3 % (ref 37–48.5)
HDLC SERPL-MCNC: 46 MG/DL (ref 40–75)
HDLC SERPL: 27.9 % (ref 20–50)
HGB BLD-MCNC: 13.2 G/DL (ref 12–16)
IMM GRANULOCYTES # BLD AUTO: 0.02 K/UL (ref 0–0.04)
IMM GRANULOCYTES NFR BLD AUTO: 0.4 % (ref 0–0.5)
LDLC SERPL CALC-MCNC: 99.8 MG/DL (ref 63–159)
LYMPHOCYTES # BLD AUTO: 2.2 K/UL (ref 1–4.8)
LYMPHOCYTES NFR BLD: 38.8 % (ref 18–48)
MAGNESIUM SERPL-MCNC: 2.3 MG/DL (ref 1.6–2.6)
MCH RBC QN AUTO: 31.2 PG (ref 27–31)
MCHC RBC AUTO-ENTMCNC: 31.2 G/DL (ref 32–36)
MCV RBC AUTO: 100 FL (ref 82–98)
MONOCYTES # BLD AUTO: 0.5 K/UL (ref 0.3–1)
MONOCYTES NFR BLD: 8.2 % (ref 4–15)
NEUTROPHILS # BLD AUTO: 2.8 K/UL (ref 1.8–7.7)
NEUTROPHILS NFR BLD: 50 % (ref 38–73)
NONHDLC SERPL-MCNC: 119 MG/DL
NRBC BLD-RTO: 0 /100 WBC
PLATELET # BLD AUTO: 313 K/UL (ref 150–450)
PMV BLD AUTO: 10 FL (ref 9.2–12.9)
POTASSIUM SERPL-SCNC: 3.8 MMOL/L (ref 3.5–5.1)
PROT SERPL-MCNC: 6.6 G/DL (ref 6–8.4)
RBC # BLD AUTO: 4.23 M/UL (ref 4–5.4)
SODIUM SERPL-SCNC: 141 MMOL/L (ref 136–145)
T4 FREE SERPL-MCNC: 0.76 NG/DL (ref 0.71–1.51)
TRIGL SERPL-MCNC: 96 MG/DL (ref 30–150)
TSH SERPL DL<=0.005 MIU/L-ACNC: 2.11 UIU/ML (ref 0.4–4)
WBC # BLD AUTO: 5.62 K/UL (ref 3.9–12.7)

## 2022-06-17 PROCEDURE — 80053 COMPREHEN METABOLIC PANEL: CPT | Performed by: INTERNAL MEDICINE

## 2022-06-17 PROCEDURE — 85025 COMPLETE CBC W/AUTO DIFF WBC: CPT | Performed by: INTERNAL MEDICINE

## 2022-06-17 PROCEDURE — 84443 ASSAY THYROID STIM HORMONE: CPT | Performed by: INTERNAL MEDICINE

## 2022-06-17 PROCEDURE — 84439 ASSAY OF FREE THYROXINE: CPT | Performed by: INTERNAL MEDICINE

## 2022-06-17 PROCEDURE — 36415 COLL VENOUS BLD VENIPUNCTURE: CPT | Mod: PO | Performed by: INTERNAL MEDICINE

## 2022-06-17 PROCEDURE — 83036 HEMOGLOBIN GLYCOSYLATED A1C: CPT | Performed by: INTERNAL MEDICINE

## 2022-06-17 PROCEDURE — 80061 LIPID PANEL: CPT | Performed by: INTERNAL MEDICINE

## 2022-06-17 PROCEDURE — 83735 ASSAY OF MAGNESIUM: CPT | Performed by: INTERNAL MEDICINE

## 2022-06-17 RX ORDER — FUROSEMIDE 20 MG/1
20 TABLET ORAL DAILY
Qty: 90 TABLET | Refills: 1 | Status: SHIPPED | OUTPATIENT
Start: 2022-06-17 | End: 2022-11-03

## 2022-06-17 RX ORDER — SERTRALINE HYDROCHLORIDE 50 MG/1
TABLET, FILM COATED ORAL
Qty: 90 TABLET | Refills: 1 | Status: SHIPPED | OUTPATIENT
Start: 2022-06-17 | End: 2022-11-01 | Stop reason: DRUGHIGH

## 2022-06-17 NOTE — TELEPHONE ENCOUNTER
----- Message from Gena Gonzalez sent at 6/17/2022  9:32 AM CDT -----  Type: Needs Medical Advice  Who Called: Patient  Symptoms (please be specific):   How long has patient had these symptoms:    Pharmacy name and phone #:  [id number] - [place] - [address]   Phone:  [phone]  Fax:  [phone]  Best Call Back Number: [phone]  Additional Information: Pt requesting a call back concerning her refills on Rx furosemide (LASIX) 20 MG tablet 30 tablet ,another Rx not listed in chart,

## 2022-06-19 ENCOUNTER — TELEPHONE (OUTPATIENT)
Dept: FAMILY MEDICINE | Facility: CLINIC | Age: 62
End: 2022-06-19
Payer: COMMERCIAL

## 2022-06-19 PROBLEM — Z83.3 FAMILY HISTORY OF DIABETES MELLITUS IN MOTHER: Status: ACTIVE | Noted: 2022-06-19

## 2022-06-19 PROBLEM — F41.9 ANXIETY: Status: ACTIVE | Noted: 2022-06-19

## 2022-06-19 PROBLEM — F41.0 PANIC ATTACKS: Status: ACTIVE | Noted: 2022-06-19

## 2022-06-19 PROBLEM — G47.9 SLEEP DISORDER: Status: ACTIVE | Noted: 2022-06-19

## 2022-06-19 PROBLEM — F41.8 SITUATIONAL ANXIETY: Status: ACTIVE | Noted: 2022-06-19

## 2022-06-19 PROBLEM — D75.89 MACROCYTOSIS: Status: ACTIVE | Noted: 2022-06-19

## 2022-06-25 DIAGNOSIS — R07.89 CHEST DISCOMFORT: ICD-10-CM

## 2022-06-25 DIAGNOSIS — F41.8 SITUATIONAL ANXIETY: ICD-10-CM

## 2022-06-25 DIAGNOSIS — R06.09 DYSPNEA ON EXERTION: ICD-10-CM

## 2022-06-25 DIAGNOSIS — F41.0 PANIC ATTACKS: ICD-10-CM

## 2022-06-25 DIAGNOSIS — G47.9 SLEEP DISORDER: ICD-10-CM

## 2022-07-01 ENCOUNTER — TELEPHONE (OUTPATIENT)
Dept: FAMILY MEDICINE | Facility: CLINIC | Age: 62
End: 2022-07-01

## 2022-07-01 RX ORDER — BUSPIRONE HYDROCHLORIDE 5 MG/1
TABLET ORAL
Qty: 30 TABLET | Refills: 2 | Status: SHIPPED | OUTPATIENT
Start: 2022-07-01 | End: 2022-07-15

## 2022-07-07 ENCOUNTER — TELEPHONE (OUTPATIENT)
Dept: HEMATOLOGY/ONCOLOGY | Facility: CLINIC | Age: 62
End: 2022-07-07
Payer: COMMERCIAL

## 2022-07-07 NOTE — TELEPHONE ENCOUNTER
----- Message from [facility] sent at 7/7/2022 11:05 AM CDT -----  .Type: Appointment Request     Caller is requesting appointment BUMP INSIDE OF NOSE     Was A Appointment Solution Found When Scheduling? NONE     Best Call Back Number: [phone]    Additional Information:  NONE

## 2022-07-07 NOTE — TELEPHONE ENCOUNTER
Called and spoke with pt.   She stated she has a lesion in her right nostril for several months.  She said it burns at times and she sees it with her magnify mirror. Pt reports to her it looks like a white wart, no oozing, said it seems as if the diameter of it is larger now and burning when it is acting up.  She said she seen Swapnil Yi, but when she seen him he said he did not see it, but recommended her see Dr Whitman if it continues to burn, also per his note on appt 6/6/22 in Epic is to refer to Dr Whitman if cont to have burning and papules in nose.     Told pt I will have Dr Whitman review her chart and someone will call her back with directions in scheduling.   Pt verbalized understanding.

## 2022-07-08 NOTE — TELEPHONE ENCOUNTER
7/8/22  Dr Whitman reviewed pt's chart and stated the pt dont need to see her as ENT surgeon at this time and recommends the pt see ENT in the clinic.  Called and informed the pt and forward call to [facility] main line for pt to request ENT appt.  Pt verbalized understanding.

## 2022-07-14 DIAGNOSIS — R07.89 CHEST DISCOMFORT: ICD-10-CM

## 2022-07-14 DIAGNOSIS — F41.8 SITUATIONAL ANXIETY: ICD-10-CM

## 2022-07-14 DIAGNOSIS — F41.0 PANIC ATTACKS: ICD-10-CM

## 2022-07-14 DIAGNOSIS — G47.9 SLEEP DISORDER: ICD-10-CM

## 2022-07-14 DIAGNOSIS — R06.09 DYSPNEA ON EXERTION: ICD-10-CM

## 2022-07-15 RX ORDER — BUSPIRONE HYDROCHLORIDE 5 MG/1
TABLET ORAL
Qty: 30 TABLET | Refills: 2 | Status: SHIPPED | OUTPATIENT
Start: 2022-07-15 | End: 2022-07-30

## 2022-07-20 ENCOUNTER — OFFICE VISIT (OUTPATIENT)
Dept: OTOLARYNGOLOGY | Facility: CLINIC | Age: 62
End: 2022-07-20
Payer: COMMERCIAL

## 2022-07-20 VITALS — HEIGHT: 63 IN | OXYGEN SATURATION: 97 % | BODY MASS INDEX: 49.3 KG/M2 | WEIGHT: 278.25 LBS | HEART RATE: 73 BPM

## 2022-07-20 DIAGNOSIS — D36.9 SQUAMOUS PAPILLOMA: Primary | ICD-10-CM

## 2022-07-20 DIAGNOSIS — J34.89 NASAL LESION: ICD-10-CM

## 2022-07-20 PROCEDURE — 88342 IMHCHEM/IMCYTCHM 1ST ANTB: CPT | Performed by: PATHOLOGY

## 2022-07-20 PROCEDURE — 88341 PR IHC OR ICC EACH ADD'L SINGLE ANTIBODY  STAINPR: ICD-10-PCS | Mod: 26,,, | Performed by: PATHOLOGY

## 2022-07-20 PROCEDURE — 1160F PR REVIEW ALL MEDS BY PRESCRIBER/CLIN PHARMACIST DOCUMENTED: ICD-10-PCS | Mod: CPTII,S$GLB,, | Performed by: OTOLARYNGOLOGY

## 2022-07-20 PROCEDURE — 88305 TISSUE EXAM BY PATHOLOGIST: CPT | Mod: 26,,, | Performed by: PATHOLOGY

## 2022-07-20 PROCEDURE — 1159F MED LIST DOCD IN RCRD: CPT | Mod: CPTII,S$GLB,, | Performed by: OTOLARYNGOLOGY

## 2022-07-20 PROCEDURE — 30100 INTRANASAL BIOPSY: CPT | Mod: S$GLB,,, | Performed by: OTOLARYNGOLOGY

## 2022-07-20 PROCEDURE — 3008F PR BODY MASS INDEX (BMI) DOCUMENTED: ICD-10-PCS | Mod: CPTII,S$GLB,, | Performed by: OTOLARYNGOLOGY

## 2022-07-20 PROCEDURE — 88341 IMHCHEM/IMCYTCHM EA ADD ANTB: CPT | Performed by: PATHOLOGY

## 2022-07-20 PROCEDURE — 99999 PR PBB SHADOW E&M-EST. PATIENT-LVL IV: ICD-10-PCS | Mod: PBBFAC,,, | Performed by: OTOLARYNGOLOGY

## 2022-07-20 PROCEDURE — 88342 IMHCHEM/IMCYTCHM 1ST ANTB: CPT | Mod: 26,,, | Performed by: PATHOLOGY

## 2022-07-20 PROCEDURE — 99999 PR PBB SHADOW E&M-EST. PATIENT-LVL IV: CPT | Mod: PBBFAC,,, | Performed by: OTOLARYNGOLOGY

## 2022-07-20 PROCEDURE — 99213 OFFICE O/P EST LOW 20 MIN: CPT | Mod: 25,S$GLB,, | Performed by: OTOLARYNGOLOGY

## 2022-07-20 PROCEDURE — 1159F PR MEDICATION LIST DOCUMENTED IN MEDICAL RECORD: ICD-10-PCS | Mod: CPTII,S$GLB,, | Performed by: OTOLARYNGOLOGY

## 2022-07-20 PROCEDURE — 3008F BODY MASS INDEX DOCD: CPT | Mod: CPTII,S$GLB,, | Performed by: OTOLARYNGOLOGY

## 2022-07-20 PROCEDURE — 30100 PR INTRANASAL BIOPSY: ICD-10-PCS | Mod: S$GLB,,, | Performed by: OTOLARYNGOLOGY

## 2022-07-20 PROCEDURE — 88305 TISSUE EXAM BY PATHOLOGIST: CPT | Performed by: PATHOLOGY

## 2022-07-20 PROCEDURE — 99213 PR OFFICE/OUTPT VISIT, EST, LEVL III, 20-29 MIN: ICD-10-PCS | Mod: 25,S$GLB,, | Performed by: OTOLARYNGOLOGY

## 2022-07-20 PROCEDURE — 3044F HG A1C LEVEL LT 7.0%: CPT | Mod: CPTII,S$GLB,, | Performed by: OTOLARYNGOLOGY

## 2022-07-20 PROCEDURE — 1160F RVW MEDS BY RX/DR IN RCRD: CPT | Mod: CPTII,S$GLB,, | Performed by: OTOLARYNGOLOGY

## 2022-07-20 PROCEDURE — 88305 TISSUE EXAM BY PATHOLOGIST: ICD-10-PCS | Mod: 26,,, | Performed by: PATHOLOGY

## 2022-07-20 PROCEDURE — 88341 IMHCHEM/IMCYTCHM EA ADD ANTB: CPT | Mod: 26,,, | Performed by: PATHOLOGY

## 2022-07-20 PROCEDURE — 88342 CHG IMMUNOCYTOCHEMISTRY: ICD-10-PCS | Mod: 26,,, | Performed by: PATHOLOGY

## 2022-07-20 PROCEDURE — 3044F PR MOST RECENT HEMOGLOBIN A1C LEVEL <7.0%: ICD-10-PCS | Mod: CPTII,S$GLB,, | Performed by: OTOLARYNGOLOGY

## 2022-07-20 NOTE — PROGRESS NOTES
"[facility]    Subjective:      Patient: Concepcion Oneill Patient PCP: Ernesto Mondragon MD         :  1960     Sex:  female      MRN:  [id number]          Date of Visit: 2022      Chief Complaint: Growth in nose on right side (States has a growth in right nostril for "years" (inside on the left side of right nostril). Pt states that it "looks like warts". States area is white and crusty. States burns sometimes. Has seen Dermatology and also Mr. Patrick PA-C. )      Patient ID: Concepcion Oneill is a 62 y.o. female lifelong NON-smoker self-referred for nasal lesion.Seen by an ENT years ago and treated with a "cream" without resolution.  No bleeding no pain.  Feels an occasional sense of burning on the right side of the septum at the area of the lesion.  No rapid growth.    Review of Systems   Constitutional: Negative.    HENT: Negative.  Negative for nosebleeds.    Eyes: Negative.    Respiratory: Negative.    Cardiovascular: Negative.    Gastrointestinal: Negative.    Endocrine: Negative.    Genitourinary: Negative.    Musculoskeletal: Negative.    Allergic/Immunologic: Negative.    Neurological: Negative.    Hematological: Negative.    Psychiatric/Behavioral: Negative.         Past Medical History  She has a past medical history of Arthritis, Bronchitis in child, Obesity, and Sleep apnea.    Family / Surgical / Social History  Her family history includes Breast cancer in her maternal aunt; Cancer in her other and paternal uncle; Diabetes in her sister and sister; Heart disease in her maternal aunt.    Past Surgical History:   Procedure Laterality Date     SECTION      LGA; shoulders too big.     COLONOSCOPY      COLONOSCOPY N/A 9/15/2020    Procedure: COLONOSCOPY;  Surgeon: Phan Chakraborty MD;  Location: [facility];  Service: Endoscopy;  Laterality: N/A;    HYSTERECTOMY      w BSO; pregnant in tube; then  when pregnant in ovary later had hysterectomy.        Social History "     Tobacco Use    Smoking status: Never Smoker    Smokeless tobacco: Never Used   Substance and Sexual Activity    Alcohol use: Not Currently     Comment: Just for Holidays    Drug use: Never    Sexual activity: Yes     Partners: Male       Medications  She has a current medication list which includes the following prescription(s): ascorbic acid (vitamin c), aspirin, buspirone, calcium carbonate, cranberry, estradiol, fluticasone propionate, furosemide, glucosamine hcl/chondroitin ambrose, lactobacillus rhamnosus gg, loratadine, multivitamin with minerals, omega-3 fatty acids/fish oil, pantoprazole, pravastatin, sertraline, trazodone, triamterene-hydrochlorothiazide 37.5-25 mg, ubidecarenone, and vitamin d.      Allergies  Review of patient's allergies indicates:   Allergen Reactions    Nitrofurantoin      Sec dose led to cramps and nausea so was discontinued    Atorvastatin      Led to myalgias so was discontinued; not benefited much by Co Q10 supplementation       All medications, allergies, and past history have been reviewed.    Objective:      Vitals:  Vitals - 1 value per visit 6/13/2022 7/20/2022 7/20/2022   SYSTOLIC 124 - -   DIASTOLIC 66 - -   Pulse 66 - 73   Temp - - -   Resp - - -   SPO2 99 - 97   Weight (lb) 282.3 - 278.22   Weight (kg) 128.05 - 126.2   Height 63 - 63   BMI (Calculated) 50 - 49.3   VISIT REPORT - - -   Pain Score  - 0 -       Body surface area is 2.37 meters squared.    Physical Exam:    GENERAL  APPEARANCE -  alert, appears stated age and cooperative  BARRIER(S) TO COMMUNICATION -  none VOICE - appropriate for age and gender    INTEGUMENTARY  no suspicious head and neck lesions    HEENT  HEAD: Normocephalic, without obvious abnormality, atraumatic  FACE: INSPECTION - Symmetric, no signs of trauma, no suspicious lesion(s)      EYES  Normal occular alignment and mobility with no visible nystagmus at rest    EARS/NOSE/MOUTH/THROAT  EARS  PINNAE AND EXTERNAL EARS - no suspicious lesion  "    NOSE AND SINUSES  EXTERNAL NOSE - Grossly normal for age/sex  SEPTUM - bilobed/or two small white cutaneous lesions right cutaneous septum base, no mucosal involvement, no ulceration TURBINATES - within normal limits MUCOSA - within normal limits     CHEST AND LUNG   INSPECTION & AUSCULTATION - normal effort, no stridor    CARDIOVASCULAR  AUSCULTATION & PERIPHERAL VASCULAR - regular rate and rhythm.    NEUROLOGIC  MENTAL STATUS - alert, interactive CRANIAL NERVES - normal    LYMPHATIC  HEAD AND NECK - non-palpable      Procedure(s):  After obtaining informed consent which was signed the nasal vestibule was swabbed with Betadine swabs and then the cutaneous portion of the right nasal septum injected subcutaneously with 2% lidocaine with epinephrine less than 0.5 mL then the 2 squamous lesions were excised with the immediate underlying subcutaneous tissue using hot wire cautery with the edges of the shave excision and the base further cauterized with no active bleeding.  Patient tolerated well.  Specimen was sent in buffered formalin container labeled with patient name and "right nasal lesion".    Labs:  WBC   Date Value Ref Range Status   06/17/2022 5.62 3.90 - 12.70 K/uL Final     Hemoglobin   Date Value Ref Range Status   06/17/2022 13.2 12.0 - 16.0 g/dL Final     Platelets   Date Value Ref Range Status   06/17/2022 313 150 - 450 K/uL Final     Creatinine   Date Value Ref Range Status   06/17/2022 0.9 0.5 - 1.4 mg/dL Final     TSH   Date Value Ref Range Status   06/17/2022 2.113 0.400 - 4.000 uIU/mL Final     Glucose   Date Value Ref Range Status   06/17/2022 96 70 - 110 mg/dL Final     Hemoglobin A1C   Date Value Ref Range Status   06/17/2022 5.3 4.0 - 5.6 % Final     Comment:     ADA Screening Guidelines:  5.7-6.4%  Consistent with prediabetes  >or=6.5%  Consistent with diabetes    High levels of fetal hemoglobin interfere with the HbA1C  assay. Heterozygous hemoglobin variants (HbS, HgC, etc)do  not " significantly interfere with this assay.   However, presence of multiple variants may affect accuracy.           Assessment:      Problem List Items Addressed This Visit        ENT    Nasal lesion       Oncology    Squamous papilloma - Primary               Plan:       Shave cautery excision. Specimen sent.  Aftercare reviewed.

## 2022-07-20 NOTE — PATIENT INSTRUCTIONS
Shave excision and cauterization of right cutaneous septal lesions consistent with papilloma performed today.  Call if not notified of pathology results within 2 weeks.  Use saline to keep the nose clear and lots of ointment such as Aquaphor or Vaseline or over-the-counter bacitracin for a week several times daily until fully healed.  Return for recheck if there are any concerns such as poor healing, increasing pain, recurrence of lesion or any other concerns.

## 2022-07-27 ENCOUNTER — PATIENT MESSAGE (OUTPATIENT)
Dept: OTOLARYNGOLOGY | Facility: CLINIC | Age: 62
End: 2022-07-27
Payer: COMMERCIAL

## 2022-07-27 LAB
FINAL PATHOLOGIC DIAGNOSIS: NORMAL
GROSS: NORMAL
Lab: NORMAL
MICROSCOPIC EXAM: NORMAL

## 2022-07-28 DIAGNOSIS — R06.09 DYSPNEA ON EXERTION: ICD-10-CM

## 2022-07-28 DIAGNOSIS — F41.0 PANIC ATTACKS: ICD-10-CM

## 2022-07-28 DIAGNOSIS — G47.9 SLEEP DISORDER: ICD-10-CM

## 2022-07-28 DIAGNOSIS — F41.8 SITUATIONAL ANXIETY: ICD-10-CM

## 2022-07-28 DIAGNOSIS — R07.89 CHEST DISCOMFORT: ICD-10-CM

## 2022-07-30 RX ORDER — BUSPIRONE HYDROCHLORIDE 5 MG/1
TABLET ORAL
Qty: 30 TABLET | Refills: 2 | Status: SHIPPED | OUTPATIENT
Start: 2022-07-30 | End: 2022-09-27

## 2022-08-09 DIAGNOSIS — R06.09 DYSPNEA ON EXERTION: ICD-10-CM

## 2022-08-09 DIAGNOSIS — F41.0 PANIC ATTACKS: ICD-10-CM

## 2022-08-09 DIAGNOSIS — F41.8 SITUATIONAL ANXIETY: ICD-10-CM

## 2022-08-09 DIAGNOSIS — G47.9 SLEEP DISORDER: ICD-10-CM

## 2022-08-09 DIAGNOSIS — R07.89 CHEST DISCOMFORT: ICD-10-CM

## 2022-08-09 RX ORDER — BUSPIRONE HYDROCHLORIDE 5 MG/1
TABLET ORAL
Qty: 30 TABLET | Refills: 2 | OUTPATIENT
Start: 2022-08-09

## 2022-10-06 DIAGNOSIS — F41.8 SITUATIONAL ANXIETY: ICD-10-CM

## 2022-10-06 DIAGNOSIS — G47.9 SLEEP DISORDER: ICD-10-CM

## 2022-10-06 DIAGNOSIS — F41.0 PANIC ATTACKS: ICD-10-CM

## 2022-10-06 DIAGNOSIS — R07.89 CHEST DISCOMFORT: ICD-10-CM

## 2022-10-06 DIAGNOSIS — R06.09 DYSPNEA ON EXERTION: ICD-10-CM

## 2022-10-10 RX ORDER — BUSPIRONE HYDROCHLORIDE 5 MG/1
TABLET ORAL
Qty: 30 TABLET | Refills: 2 | OUTPATIENT
Start: 2022-10-10

## 2022-11-01 ENCOUNTER — OFFICE VISIT (OUTPATIENT)
Dept: FAMILY MEDICINE | Facility: CLINIC | Age: 62
End: 2022-11-01
Payer: COMMERCIAL

## 2022-11-01 VITALS
RESPIRATION RATE: 16 BRPM | OXYGEN SATURATION: 96 % | TEMPERATURE: 98 F | BODY MASS INDEX: 49.21 KG/M2 | WEIGHT: 277.75 LBS | HEIGHT: 63 IN | HEART RATE: 65 BPM | DIASTOLIC BLOOD PRESSURE: 84 MMHG | SYSTOLIC BLOOD PRESSURE: 134 MMHG

## 2022-11-01 DIAGNOSIS — G47.9 SLEEP DISORDER: ICD-10-CM

## 2022-11-01 DIAGNOSIS — R07.89 CHEST DISCOMFORT: ICD-10-CM

## 2022-11-01 DIAGNOSIS — B37.2 YEAST INFECTION OF THE SKIN: Primary | ICD-10-CM

## 2022-11-01 DIAGNOSIS — E66.01 CLASS 3 SEVERE OBESITY WITH BODY MASS INDEX (BMI) OF 50.0 TO 59.9 IN ADULT, UNSPECIFIED OBESITY TYPE, UNSPECIFIED WHETHER SERIOUS COMORBIDITY PRESENT: ICD-10-CM

## 2022-11-01 DIAGNOSIS — I10 ESSENTIAL HYPERTENSION: ICD-10-CM

## 2022-11-01 DIAGNOSIS — F41.8 SITUATIONAL ANXIETY: ICD-10-CM

## 2022-11-01 DIAGNOSIS — R06.09 DYSPNEA ON EXERTION: ICD-10-CM

## 2022-11-01 DIAGNOSIS — F41.0 PANIC ATTACKS: ICD-10-CM

## 2022-11-01 PROCEDURE — 99999 PR PBB SHADOW E&M-EST. PATIENT-LVL IV: ICD-10-PCS | Mod: PBBFAC,,, | Performed by: INTERNAL MEDICINE

## 2022-11-01 PROCEDURE — 99214 PR OFFICE/OUTPT VISIT, EST, LEVL IV, 30-39 MIN: ICD-10-PCS | Mod: S$GLB,,, | Performed by: INTERNAL MEDICINE

## 2022-11-01 PROCEDURE — 3044F HG A1C LEVEL LT 7.0%: CPT | Mod: CPTII,S$GLB,, | Performed by: INTERNAL MEDICINE

## 2022-11-01 PROCEDURE — 3044F PR MOST RECENT HEMOGLOBIN A1C LEVEL <7.0%: ICD-10-PCS | Mod: CPTII,S$GLB,, | Performed by: INTERNAL MEDICINE

## 2022-11-01 PROCEDURE — 99214 OFFICE O/P EST MOD 30 MIN: CPT | Mod: S$GLB,,, | Performed by: INTERNAL MEDICINE

## 2022-11-01 PROCEDURE — 3075F PR MOST RECENT SYSTOLIC BLOOD PRESS GE 130-139MM HG: ICD-10-PCS | Mod: CPTII,S$GLB,, | Performed by: INTERNAL MEDICINE

## 2022-11-01 PROCEDURE — 1159F MED LIST DOCD IN RCRD: CPT | Mod: CPTII,S$GLB,, | Performed by: INTERNAL MEDICINE

## 2022-11-01 PROCEDURE — 99999 PR PBB SHADOW E&M-EST. PATIENT-LVL IV: CPT | Mod: PBBFAC,,, | Performed by: INTERNAL MEDICINE

## 2022-11-01 PROCEDURE — 1159F PR MEDICATION LIST DOCUMENTED IN MEDICAL RECORD: ICD-10-PCS | Mod: CPTII,S$GLB,, | Performed by: INTERNAL MEDICINE

## 2022-11-01 PROCEDURE — 3075F SYST BP GE 130 - 139MM HG: CPT | Mod: CPTII,S$GLB,, | Performed by: INTERNAL MEDICINE

## 2022-11-01 PROCEDURE — 3079F PR MOST RECENT DIASTOLIC BLOOD PRESSURE 80-89 MM HG: ICD-10-PCS | Mod: CPTII,S$GLB,, | Performed by: INTERNAL MEDICINE

## 2022-11-01 PROCEDURE — 3079F DIAST BP 80-89 MM HG: CPT | Mod: CPTII,S$GLB,, | Performed by: INTERNAL MEDICINE

## 2022-11-01 RX ORDER — SERTRALINE HYDROCHLORIDE 50 MG/1
50 TABLET, FILM COATED ORAL DAILY
COMMUNITY
End: 2023-02-04

## 2022-11-01 RX ORDER — KETOCONAZOLE 20 MG/ML
SHAMPOO, SUSPENSION TOPICAL DAILY
Qty: 120 ML | Refills: 0 | Status: SHIPPED | OUTPATIENT
Start: 2022-11-01 | End: 2022-11-30

## 2022-11-01 RX ORDER — FLUCONAZOLE 150 MG/1
150 TABLET ORAL EVERY OTHER DAY
Qty: 3 TABLET | Refills: 0 | Status: SHIPPED | OUTPATIENT
Start: 2022-11-01 | End: 2022-11-04

## 2022-11-01 RX ORDER — CLOTRIMAZOLE AND BETAMETHASONE DIPROPIONATE 10; .64 MG/G; MG/G
CREAM TOPICAL
Qty: 45 G | Refills: 0 | Status: SHIPPED | OUTPATIENT
Start: 2022-11-01 | End: 2023-02-18

## 2022-11-01 RX ORDER — BUSPIRONE HYDROCHLORIDE 5 MG/1
TABLET ORAL
Qty: 30 TABLET | Refills: 2 | Status: SHIPPED | OUTPATIENT
Start: 2022-11-01 | End: 2022-11-24

## 2022-11-01 NOTE — PROGRESS NOTES
"Subjective:       Patient ID: Concepcion Oneill is a 62 y.o. female.    Chief Complaint: Rash (Buttocks/ red / itchy/ burns/ noticed it 2 days ago )  Dr Mondragon patient    Started 2-3 days ago.  She Burning, itching, the top of her buttocks and in between.    never had before .  Does suffer with yeast infections under her breast has a topical cream from gyn at home has not used yet.    Has had vaccine for shingles.     Rash  This is a new problem. The current episode started in the past 7 days. The problem has been gradually worsening since onset. Location: Gluteal cleft. The rash is characterized by burning, itchiness and redness. She was exposed to nothing. Pertinent negatives include no eye pain. Past treatments include nothing.   No new detergents    Past medical history  Hypertension: controlled Rx Dyazide      Review of Systems:  Review of Systems   Constitutional:  Negative for appetite change.   HENT:  Negative for nosebleeds.    Eyes:  Negative for pain.   Respiratory:  Negative for choking.    Cardiovascular:  Negative for chest pain.   Gastrointestinal:  Negative for blood in stool.   Genitourinary:  Negative for hematuria.   Musculoskeletal:  Negative for joint swelling.   Skin:  Positive for rash. Negative for pallor.   Neurological:  Negative for facial asymmetry.   Hematological:  Does not bruise/bleed easily.   Psychiatric/Behavioral:  Negative for confusion.      Objective:     Vitals:    11/01/22 1026   BP: 134/84   Pulse: 65   Resp: 16   Temp: 98.1 °F (36.7 °C)   SpO2: 96%   Weight: 126 kg (277 lb 12.5 oz)   Height: 5' 3" (1.6 m)          Physical Exam  Constitutional:       Appearance: Normal appearance.   HENT:      Head: Normocephalic and atraumatic.   Eyes:      Extraocular Movements: Extraocular movements intact.      Pupils: Pupils are equal, round, and reactive to light.   Pulmonary:      Effort: Pulmonary effort is normal.   Skin:     Findings: Erythema and rash present. Rash is papular " and purpuric.      Comments: Gluteal cleft bilateral erythema irritation skin breakdown appears to be yeast in nature.  Extends to rectal sphincter   Neurological:      Mental Status: She is alert and oriented to person, place, and time.   Psychiatric:         Mood and Affect: Mood normal.       Medication List with Changes/Refills   New Medications    FLUCONAZOLE (DIFLUCAN) 150 MG TAB    Take 1 tablet (150 mg total) by mouth every other day. for 3 days    KETOCONAZOLE (NIZORAL) 2 % SHAMPOO    Apply topically once daily. For 5-7 days then prn (yeast infection)   Current Medications    ASCORBIC ACID, VITAMIN C, (VITAMIN C) 1000 MG TABLET    Take 1,000 mg by mouth once daily.    ASPIRIN (ECOTRIN) 81 MG EC TABLET    Take 81 mg by mouth once daily.    BUSPIRONE (BUSPAR) 5 MG TAB    TAKE 1 TABLET BY MOUTH EVERY MORNING, CAN TAKE EXTRA DOSE DURING THE DAY AS NEEDED FOR ANXIETY AT LEAST 4 HOURS LATER    CALCIUM CARBONATE 650 MG CALCIUM (1,625 MG) TABLET    Take 1 tablet by mouth once daily.    CRANBERRY 500 MG CAP    Take by mouth Daily.    ESTRADIOL (ESTRACE) 0.01 % (0.1 MG/GRAM) VAGINAL CREAM    Place 1 g vaginally 3 (three) times a week.    FLUTICASONE PROPIONATE (FLONASE) 50 MCG/ACTUATION NASAL SPRAY    1-2 sprays a day for congestion    FUROSEMIDE (LASIX) 20 MG TABLET    TAKE 1 TABLET (20 MG TOTAL) BY MOUTH ONCE DAILY. GENERIC    GLUCOSAMINE HCL/CHONDROITIN MEHTA (GLUCOSAMINE-CHONDROITIN ORAL)    Take by mouth 2 (two) times daily as needed.     LACTOBACILLUS RHAMNOSUS GG (CULTURELLE) 10 BILLION CELL CAPSULE    Take 1 capsule by mouth once daily.    LORATADINE (CLARITIN) 10 MG TABLET    TAKE 1 TABLET (10 MG TOTAL) BY MOUTH DAILY AS NEEDED FOR ALLERGIES (CONGESTION).    MULTIVITAMIN WITH MINERALS TABLET    Take 1 tablet by mouth once daily.    OMEGA-3 FATTY ACIDS/FISH OIL (FISH OIL OMEGA 3-6-9 ORAL)    Take by mouth 2 (two) times a day.    PANTOPRAZOLE (PROTONIX) 40 MG TABLET    40 mg po daily as needed for reflux.     PRAVASTATIN (PRAVACHOL) 40 MG TABLET    TAKE 1 TABLET BY MOUTH EVERY DAY IN THE EVENING    SERTRALINE (ZOLOFT) 50 MG TABLET    Take 50 mg by mouth once daily.    TRAZODONE (DESYREL) 50 MG TABLET    TAKE 1 TABLET (50 MG TOTAL) BY MOUTH NIGHTLY AS NEEDED FOR INSOMNIA. PLEASE PROVIDE GENERIC    TRIAMTERENE-HYDROCHLOROTHIAZIDE 37.5-25 MG (DYAZIDE) 37.5-25 MG PER CAPSULE    Take 1 capsule by mouth every morning. Take every day at noon    UBIDECARENONE (COQ-10 ORAL)    Take by mouth once daily.    VITAMIN D (VITAMIN D3) 1000 UNITS TAB    Take 1,000 Units by mouth once daily.   Changed and/or Refilled Medications    Modified Medication Previous Medication    CLOTRIMAZOLE-BETAMETHASONE 1-0.05% (LOTRISONE) CREAM clotrimazole-betamethasone 1-0.05% (LOTRISONE) cream       APPLY TO AFFECTED AREA TWICE A DAY    APPLY TO AFFECTED AREA TWICE A DAY   Discontinued Medications    SERTRALINE (ZOLOFT) 50 MG TABLET    TAKE 1/2 TABLET BY MOUTH DAILY FOR 1 WEEK THEN INCREASE TO 1 TABLET DAILY AFTER       Assessment & Plan:  1. Yeast infection of the skin    2. Essential hypertension    3. Class 3 severe obesity with body mass index (BMI) of 50.0 to 59.9 in adult, unspecified obesity type, unspecified whether serious comorbidity present  - clotrimazole-betamethasone 1-0.05% (LOTRISONE) cream; APPLY TO AFFECTED AREA TWICE A DAY  Dispense: 45 g; Refill: 0   Yeast infection of the skin    Essential hypertension    Class 3 severe obesity with body mass index (BMI) of 50.0 to 59.9 in adult, unspecified obesity type, unspecified whether serious comorbidity present  -     clotrimazole-betamethasone 1-0.05% (LOTRISONE) cream; APPLY TO AFFECTED AREA TWICE A DAY  Dispense: 45 g; Refill: 0    Other orders  -     ketoconazole (NIZORAL) 2 % shampoo; Apply topically once daily. For 5-7 days then prn (yeast infection)  Dispense: 120 mL; Refill: 0  -     fluconazole (DIFLUCAN) 150 MG Tab; Take 1 tablet (150 mg total) by mouth every other day. for 3 days   Dispense: 3 tablet; Refill: 0      Continue to work on regular exercise, maintain healthy weight, balanced diet. Avoid unhealthy habits: smoking, excessive alcohol intake.

## 2022-11-17 ENCOUNTER — OFFICE VISIT (OUTPATIENT)
Dept: FAMILY MEDICINE | Facility: CLINIC | Age: 62
End: 2022-11-17
Payer: COMMERCIAL

## 2022-11-17 VITALS
HEIGHT: 63 IN | WEIGHT: 275 LBS | DIASTOLIC BLOOD PRESSURE: 76 MMHG | RESPIRATION RATE: 18 BRPM | BODY MASS INDEX: 48.73 KG/M2 | SYSTOLIC BLOOD PRESSURE: 124 MMHG

## 2022-11-17 DIAGNOSIS — J06.9 VIRAL URI WITH COUGH: Primary | ICD-10-CM

## 2022-11-17 LAB
CTP QC/QA: YES
SARS-COV-2 RDRP RESP QL NAA+PROBE: NEGATIVE

## 2022-11-17 PROCEDURE — 3078F PR MOST RECENT DIASTOLIC BLOOD PRESSURE < 80 MM HG: ICD-10-PCS | Mod: CPTII,S$GLB,, | Performed by: STUDENT IN AN ORGANIZED HEALTH CARE EDUCATION/TRAINING PROGRAM

## 2022-11-17 PROCEDURE — 3008F BODY MASS INDEX DOCD: CPT | Mod: CPTII,S$GLB,, | Performed by: STUDENT IN AN ORGANIZED HEALTH CARE EDUCATION/TRAINING PROGRAM

## 2022-11-17 PROCEDURE — 3008F PR BODY MASS INDEX (BMI) DOCUMENTED: ICD-10-PCS | Mod: CPTII,S$GLB,, | Performed by: STUDENT IN AN ORGANIZED HEALTH CARE EDUCATION/TRAINING PROGRAM

## 2022-11-17 PROCEDURE — 99213 OFFICE O/P EST LOW 20 MIN: CPT | Mod: S$GLB,,, | Performed by: STUDENT IN AN ORGANIZED HEALTH CARE EDUCATION/TRAINING PROGRAM

## 2022-11-17 PROCEDURE — 99999 PR PBB SHADOW E&M-EST. PATIENT-LVL IV: ICD-10-PCS | Mod: PBBFAC,,, | Performed by: STUDENT IN AN ORGANIZED HEALTH CARE EDUCATION/TRAINING PROGRAM

## 2022-11-17 PROCEDURE — 3044F PR MOST RECENT HEMOGLOBIN A1C LEVEL <7.0%: ICD-10-PCS | Mod: CPTII,S$GLB,, | Performed by: STUDENT IN AN ORGANIZED HEALTH CARE EDUCATION/TRAINING PROGRAM

## 2022-11-17 PROCEDURE — 3078F DIAST BP <80 MM HG: CPT | Mod: CPTII,S$GLB,, | Performed by: STUDENT IN AN ORGANIZED HEALTH CARE EDUCATION/TRAINING PROGRAM

## 2022-11-17 PROCEDURE — 99999 PR PBB SHADOW E&M-EST. PATIENT-LVL IV: CPT | Mod: PBBFAC,,, | Performed by: STUDENT IN AN ORGANIZED HEALTH CARE EDUCATION/TRAINING PROGRAM

## 2022-11-17 PROCEDURE — 3044F HG A1C LEVEL LT 7.0%: CPT | Mod: CPTII,S$GLB,, | Performed by: STUDENT IN AN ORGANIZED HEALTH CARE EDUCATION/TRAINING PROGRAM

## 2022-11-17 PROCEDURE — 99213 PR OFFICE/OUTPT VISIT, EST, LEVL III, 20-29 MIN: ICD-10-PCS | Mod: S$GLB,,, | Performed by: STUDENT IN AN ORGANIZED HEALTH CARE EDUCATION/TRAINING PROGRAM

## 2022-11-17 PROCEDURE — 3074F PR MOST RECENT SYSTOLIC BLOOD PRESSURE < 130 MM HG: ICD-10-PCS | Mod: CPTII,S$GLB,, | Performed by: STUDENT IN AN ORGANIZED HEALTH CARE EDUCATION/TRAINING PROGRAM

## 2022-11-17 PROCEDURE — 3074F SYST BP LT 130 MM HG: CPT | Mod: CPTII,S$GLB,, | Performed by: STUDENT IN AN ORGANIZED HEALTH CARE EDUCATION/TRAINING PROGRAM

## 2022-11-17 PROCEDURE — 87635 SARS-COV-2 COVID-19 AMP PRB: CPT | Mod: QW,S$GLB,, | Performed by: STUDENT IN AN ORGANIZED HEALTH CARE EDUCATION/TRAINING PROGRAM

## 2022-11-17 PROCEDURE — 1159F MED LIST DOCD IN RCRD: CPT | Mod: CPTII,S$GLB,, | Performed by: STUDENT IN AN ORGANIZED HEALTH CARE EDUCATION/TRAINING PROGRAM

## 2022-11-17 PROCEDURE — 1159F PR MEDICATION LIST DOCUMENTED IN MEDICAL RECORD: ICD-10-PCS | Mod: CPTII,S$GLB,, | Performed by: STUDENT IN AN ORGANIZED HEALTH CARE EDUCATION/TRAINING PROGRAM

## 2022-11-17 PROCEDURE — 87635: ICD-10-PCS | Mod: QW,S$GLB,, | Performed by: STUDENT IN AN ORGANIZED HEALTH CARE EDUCATION/TRAINING PROGRAM

## 2022-11-17 RX ORDER — ALBUTEROL SULFATE 90 UG/1
2 AEROSOL, METERED RESPIRATORY (INHALATION) EVERY 6 HOURS PRN
Qty: 18 G | Refills: 0 | Status: SHIPPED | OUTPATIENT
Start: 2022-11-17 | End: 2022-12-13

## 2022-11-17 RX ORDER — GUAIFENESIN/DEXTROMETHORPHAN 100-10MG/5
5 SYRUP ORAL EVERY 6 HOURS PRN
Qty: 473 ML | Refills: 0 | Status: SHIPPED | OUTPATIENT
Start: 2022-11-17 | End: 2022-11-27

## 2022-11-17 NOTE — PROGRESS NOTES
"Subjective:      Patient ID: Concepcion Oneill is a 62 y.o. female    Chief Complaint:  Upper respiratory symptoms    HPI  62 y.o. female with a PMHx as documented below presents to clinic today for URI symptoms.     She reports that symptoms first started 1 week.    She has been having runny nose, congestion, cough, and chills. She denies having any shortness of breath, wheezing, and fever.    No known sick contacts although pt does watch young grandkids frequently.    Past Medical History:   Diagnosis Date    Arthritis     Bronchitis in child     Obesity     Sleep apnea     uses cpap      ROS completed and negative unless otherwise mentioned above in HPI.    Objective:      Vitals:    11/17/22 1409   BP: 124/76   BP Location: Right arm   Patient Position: Sitting   Resp: 18   Weight: 124.7 kg (275 lb 0.4 oz)   Height: 5' 3" (1.6 m)     Physical Exam  Vitals reviewed.   Constitutional:       General: She is not in acute distress.  HENT:      Head: Normocephalic and atraumatic.   Cardiovascular:      Rate and Rhythm: Normal rate.   Pulmonary:      Effort: Pulmonary effort is normal. No respiratory distress.      Breath sounds: No wheezing or rales.   Neurological:      General: No focal deficit present.      Mental Status: She is alert and oriented to person, place, and time. Mental status is at baseline.      Assessment:       1. Viral URI with cough      Plan:       Concepcion was seen today for cough.    Diagnoses and all orders for this visit:    Viral URI with cough  -     dextromethorphan-guaiFENesin  mg/5 ml (ROBITUSSIN-DM)  mg/5 mL liquid; Take 5 mLs by mouth every 6 (six) hours as needed (Cough, congestion).  -     POCT COVID-19 Rapid Screening    COVID-19 negative.     Patient presenting with symptoms of typical viral URI. No signs or symptoms of bacterial superinfection to suggest need for antibiotics. Discussed the reason for not prescribing antibiotics at this time. Discussed symptomatic " management with OTC meds and saline rinses (prescription medications as indicated). Patient advised to let us know if symptoms persist or if she develops any new or worsening symptoms.      Yecenia Daly MD  [facility]  Office: [phone]   Fax: [phone]  11/17/2022      Disclaimer: This note was partly generated using dictation software which may occasionally result in transcription errors.

## 2022-11-30 RX ORDER — KETOCONAZOLE 20 MG/ML
SHAMPOO, SUSPENSION TOPICAL
Qty: 120 ML | Refills: 0 | Status: SHIPPED | OUTPATIENT
Start: 2022-11-30

## 2023-01-20 DIAGNOSIS — F41.8 SITUATIONAL ANXIETY: ICD-10-CM

## 2023-01-20 DIAGNOSIS — F41.0 PANIC ATTACKS: ICD-10-CM

## 2023-01-20 DIAGNOSIS — R06.09 DYSPNEA ON EXERTION: ICD-10-CM

## 2023-01-20 DIAGNOSIS — R07.89 CHEST DISCOMFORT: ICD-10-CM

## 2023-01-20 DIAGNOSIS — G47.9 SLEEP DISORDER: ICD-10-CM

## 2023-01-25 RX ORDER — BUSPIRONE HYDROCHLORIDE 5 MG/1
TABLET ORAL
Qty: 60 TABLET | Refills: 0 | Status: SHIPPED | OUTPATIENT
Start: 2023-01-25 | End: 2023-02-28

## 2023-01-31 NOTE — TELEPHONE ENCOUNTER
Refill Routing Note   Medication(s) are not appropriate for processing by [facility] for the following reason(s):       No active prescription written by PCP    [facility] action(s):  Defer                   Medication Therapy Plan: Historical Provider rx      Appointments  past 12m or future 3m with PCP    Date Provider   Last Visit   6/13/2022 Ernesto Mondragon MD   Next Visit   Visit date not found Ernesto Mondragon MD   ED visits in past 90 days: 0        Note composed:10:35 AM 01/31/2023

## 2023-01-31 NOTE — TELEPHONE ENCOUNTER
No new care gaps identified.  [facility] Embedded Care Gaps. Reference number: [id number]. 1/31/2023   10:23:15 AM CST

## 2023-02-02 ENCOUNTER — HOSPITAL ENCOUNTER (OUTPATIENT)
Dept: RADIOLOGY | Facility: HOSPITAL | Age: 63
Discharge: HOME | End: 2023-02-02
Attending: OBSTETRICS & GYNECOLOGY
Payer: COMMERCIAL

## 2023-02-02 DIAGNOSIS — Z12.31 ENCOUNTER FOR SCREENING MAMMOGRAM FOR MALIGNANT NEOPLASM OF BREAST: ICD-10-CM

## 2023-02-02 PROCEDURE — 77067 MAMMO DIGITAL SCREENING BILAT WITH TOMO: ICD-10-PCS | Mod: 26,,, | Performed by: RADIOLOGY

## 2023-02-02 PROCEDURE — 77067 SCR MAMMO BI INCL CAD: CPT | Mod: TC,PO

## 2023-02-02 PROCEDURE — 77063 BREAST TOMOSYNTHESIS BI: CPT | Mod: 26,,, | Performed by: RADIOLOGY

## 2023-02-02 PROCEDURE — 77067 SCR MAMMO BI INCL CAD: CPT | Mod: 26,,, | Performed by: RADIOLOGY

## 2023-02-02 PROCEDURE — 77063 MAMMO DIGITAL SCREENING BILAT WITH TOMO: ICD-10-PCS | Mod: 26,,, | Performed by: RADIOLOGY

## 2023-02-04 RX ORDER — SERTRALINE HYDROCHLORIDE 50 MG/1
TABLET, FILM COATED ORAL
Qty: 90 TABLET | Refills: 1 | Status: SHIPPED | OUTPATIENT
Start: 2023-02-04 | End: 2023-11-01

## 2023-07-13 DIAGNOSIS — E66.01 CLASS 3 SEVERE OBESITY WITH BODY MASS INDEX (BMI) OF 50.0 TO 59.9 IN ADULT, UNSPECIFIED OBESITY TYPE, UNSPECIFIED WHETHER SERIOUS COMORBIDITY PRESENT: ICD-10-CM

## 2023-07-13 RX ORDER — CLOTRIMAZOLE AND BETAMETHASONE DIPROPIONATE 10; .64 MG/G; MG/G
CREAM TOPICAL
Qty: 45 G | Refills: 0 | Status: SHIPPED | OUTPATIENT
Start: 2023-07-13 | End: 2024-03-06 | Stop reason: SDUPTHER

## 2023-07-13 NOTE — TELEPHONE ENCOUNTER
Refill Routing Note   Medication(s) are not appropriate for processing by [facility] for the following reason(s):      Medication outside of protocol    [facility] action(s):  Route Care Due:  None identified          Appointments  past 12m or future 3m with PCP    Date Provider   Last Visit   6/13/2022 Ernesto Mondragon MD   Next Visit   Visit date not found Ernesto Mondragon MD   ED visits in past 90 days: 0        Note composed:2:25 PM 07/13/2023

## 2023-07-27 ENCOUNTER — OFFICE VISIT (OUTPATIENT)
Dept: FAMILY MEDICINE | Facility: CLINIC | Age: 63
End: 2023-07-27
Payer: COMMERCIAL

## 2023-07-27 VITALS
WEIGHT: 293 LBS | SYSTOLIC BLOOD PRESSURE: 142 MMHG | DIASTOLIC BLOOD PRESSURE: 76 MMHG | HEART RATE: 88 BPM | HEIGHT: 63 IN | OXYGEN SATURATION: 96 % | BODY MASS INDEX: 51.91 KG/M2 | RESPIRATION RATE: 18 BRPM

## 2023-07-27 DIAGNOSIS — E78.49 OTHER HYPERLIPIDEMIA: Chronic | ICD-10-CM

## 2023-07-27 DIAGNOSIS — Z11.4 ENCOUNTER FOR SCREENING FOR HUMAN IMMUNODEFICIENCY VIRUS (HIV): ICD-10-CM

## 2023-07-27 DIAGNOSIS — Z11.59 ENCOUNTER FOR HEPATITIS C SCREENING TEST FOR LOW RISK PATIENT: ICD-10-CM

## 2023-07-27 DIAGNOSIS — D75.89 MACROCYTOSIS WITHOUT ANEMIA: Chronic | ICD-10-CM

## 2023-07-27 DIAGNOSIS — Z71.85 VACCINE COUNSELING: ICD-10-CM

## 2023-07-27 DIAGNOSIS — F41.1 GENERALIZED ANXIETY DISORDER WITH PANIC ATTACKS: Primary | Chronic | ICD-10-CM

## 2023-07-27 DIAGNOSIS — I10 ESSENTIAL HYPERTENSION: Chronic | ICD-10-CM

## 2023-07-27 DIAGNOSIS — F51.01 PRIMARY INSOMNIA: ICD-10-CM

## 2023-07-27 DIAGNOSIS — F41.0 GENERALIZED ANXIETY DISORDER WITH PANIC ATTACKS: Primary | Chronic | ICD-10-CM

## 2023-07-27 DIAGNOSIS — K21.9 GASTROESOPHAGEAL REFLUX DISEASE, UNSPECIFIED WHETHER ESOPHAGITIS PRESENT: Chronic | ICD-10-CM

## 2023-07-27 DIAGNOSIS — G47.33 OSA ON CPAP: Chronic | ICD-10-CM

## 2023-07-27 DIAGNOSIS — R60.0 BILATERAL LOWER EXTREMITY EDEMA: ICD-10-CM

## 2023-07-27 PROBLEM — E66.01 MORBID OBESITY: Status: RESOLVED | Noted: 2021-01-25 | Resolved: 2023-07-27

## 2023-07-27 PROBLEM — Z12.11 COLON CANCER SCREENING: Status: RESOLVED | Noted: 2020-08-09 | Resolved: 2023-07-27

## 2023-07-27 PROBLEM — F41.9 ANXIETY: Status: RESOLVED | Noted: 2022-06-19 | Resolved: 2023-07-27

## 2023-07-27 PROBLEM — G47.9 SLEEP DISORDER: Status: RESOLVED | Noted: 2022-06-19 | Resolved: 2023-07-27

## 2023-07-27 PROBLEM — Z82.49 FAMILY HISTORY OF HYPERTENSION: Chronic | Status: ACTIVE | Noted: 2021-01-25

## 2023-07-27 PROBLEM — Z83.3 FAMILY HISTORY OF DIABETES MELLITUS IN MOTHER: Status: RESOLVED | Noted: 2022-06-19 | Resolved: 2023-07-27

## 2023-07-27 PROBLEM — E78.5 DYSLIPIDEMIA: Status: RESOLVED | Noted: 2021-01-25 | Resolved: 2023-07-27

## 2023-07-27 PROBLEM — Z83.3 FAMILY HISTORY OF DIABETES MELLITUS (DM): Chronic | Status: ACTIVE | Noted: 2020-11-10

## 2023-07-27 PROBLEM — Z83.438 FAMILY HISTORY OF HYPERLIPIDEMIA: Chronic | Status: ACTIVE | Noted: 2021-01-25

## 2023-07-27 PROBLEM — F41.8 SITUATIONAL ANXIETY: Status: RESOLVED | Noted: 2022-06-19 | Resolved: 2023-07-27

## 2023-07-27 PROCEDURE — 3078F PR MOST RECENT DIASTOLIC BLOOD PRESSURE < 80 MM HG: ICD-10-PCS | Mod: CPTII,S$GLB,, | Performed by: STUDENT IN AN ORGANIZED HEALTH CARE EDUCATION/TRAINING PROGRAM

## 2023-07-27 PROCEDURE — 3077F PR MOST RECENT SYSTOLIC BLOOD PRESSURE >= 140 MM HG: ICD-10-PCS | Mod: CPTII,S$GLB,, | Performed by: STUDENT IN AN ORGANIZED HEALTH CARE EDUCATION/TRAINING PROGRAM

## 2023-07-27 PROCEDURE — 1159F PR MEDICATION LIST DOCUMENTED IN MEDICAL RECORD: ICD-10-PCS | Mod: CPTII,S$GLB,, | Performed by: STUDENT IN AN ORGANIZED HEALTH CARE EDUCATION/TRAINING PROGRAM

## 2023-07-27 PROCEDURE — 1160F PR REVIEW ALL MEDS BY PRESCRIBER/CLIN PHARMACIST DOCUMENTED: ICD-10-PCS | Mod: CPTII,S$GLB,, | Performed by: STUDENT IN AN ORGANIZED HEALTH CARE EDUCATION/TRAINING PROGRAM

## 2023-07-27 PROCEDURE — 3077F SYST BP >= 140 MM HG: CPT | Mod: CPTII,S$GLB,, | Performed by: STUDENT IN AN ORGANIZED HEALTH CARE EDUCATION/TRAINING PROGRAM

## 2023-07-27 PROCEDURE — 99999 PR PBB SHADOW E&M-EST. PATIENT-LVL V: CPT | Mod: PBBFAC,,, | Performed by: STUDENT IN AN ORGANIZED HEALTH CARE EDUCATION/TRAINING PROGRAM

## 2023-07-27 PROCEDURE — 3008F PR BODY MASS INDEX (BMI) DOCUMENTED: ICD-10-PCS | Mod: CPTII,S$GLB,, | Performed by: STUDENT IN AN ORGANIZED HEALTH CARE EDUCATION/TRAINING PROGRAM

## 2023-07-27 PROCEDURE — 99999 PR PBB SHADOW E&M-EST. PATIENT-LVL V: ICD-10-PCS | Mod: PBBFAC,,, | Performed by: STUDENT IN AN ORGANIZED HEALTH CARE EDUCATION/TRAINING PROGRAM

## 2023-07-27 PROCEDURE — 99213 PR OFFICE/OUTPT VISIT, EST, LEVL III, 20-29 MIN: ICD-10-PCS | Mod: 25,S$GLB,, | Performed by: STUDENT IN AN ORGANIZED HEALTH CARE EDUCATION/TRAINING PROGRAM

## 2023-07-27 PROCEDURE — 1159F MED LIST DOCD IN RCRD: CPT | Mod: CPTII,S$GLB,, | Performed by: STUDENT IN AN ORGANIZED HEALTH CARE EDUCATION/TRAINING PROGRAM

## 2023-07-27 PROCEDURE — 3078F DIAST BP <80 MM HG: CPT | Mod: CPTII,S$GLB,, | Performed by: STUDENT IN AN ORGANIZED HEALTH CARE EDUCATION/TRAINING PROGRAM

## 2023-07-27 PROCEDURE — 99396 PREV VISIT EST AGE 40-64: CPT | Mod: S$GLB,,, | Performed by: STUDENT IN AN ORGANIZED HEALTH CARE EDUCATION/TRAINING PROGRAM

## 2023-07-27 PROCEDURE — 3008F BODY MASS INDEX DOCD: CPT | Mod: CPTII,S$GLB,, | Performed by: STUDENT IN AN ORGANIZED HEALTH CARE EDUCATION/TRAINING PROGRAM

## 2023-07-27 PROCEDURE — 1160F RVW MEDS BY RX/DR IN RCRD: CPT | Mod: CPTII,S$GLB,, | Performed by: STUDENT IN AN ORGANIZED HEALTH CARE EDUCATION/TRAINING PROGRAM

## 2023-07-27 PROCEDURE — 99213 OFFICE O/P EST LOW 20 MIN: CPT | Mod: 25,S$GLB,, | Performed by: STUDENT IN AN ORGANIZED HEALTH CARE EDUCATION/TRAINING PROGRAM

## 2023-07-27 PROCEDURE — 99396 PR PREVENTIVE VISIT,EST,40-64: ICD-10-PCS | Mod: S$GLB,,, | Performed by: STUDENT IN AN ORGANIZED HEALTH CARE EDUCATION/TRAINING PROGRAM

## 2023-07-27 RX ORDER — PRAVASTATIN SODIUM 40 MG/1
40 TABLET ORAL NIGHTLY
Qty: 90 TABLET | Refills: 3 | Status: CANCELLED | OUTPATIENT
Start: 2023-07-27 | End: 2024-07-21

## 2023-07-27 RX ORDER — PANTOPRAZOLE SODIUM 40 MG/1
40 TABLET, DELAYED RELEASE ORAL DAILY PRN
Qty: 90 TABLET | Refills: 3 | Status: CANCELLED | OUTPATIENT
Start: 2023-07-27 | End: 2024-07-21

## 2023-07-27 RX ORDER — TRIAMTERENE AND HYDROCHLOROTHIAZIDE 37.5; 25 MG/1; MG/1
1 CAPSULE ORAL EVERY MORNING
Qty: 90 CAPSULE | Refills: 3 | Status: CANCELLED | OUTPATIENT
Start: 2023-07-27 | End: 2024-07-21

## 2023-07-27 RX ORDER — SERTRALINE HYDROCHLORIDE 50 MG/1
50 TABLET, FILM COATED ORAL DAILY
Qty: 90 TABLET | Refills: 3 | Status: CANCELLED | OUTPATIENT
Start: 2023-07-27 | End: 2024-07-21

## 2023-07-27 RX ORDER — PANTOPRAZOLE SODIUM 40 MG/1
40 TABLET, DELAYED RELEASE ORAL DAILY PRN
Qty: 90 TABLET | Refills: 3
Start: 2023-07-27 | End: 2024-07-21

## 2023-07-27 RX ORDER — HYDROXYZINE PAMOATE 25 MG/1
25 CAPSULE ORAL NIGHTLY PRN
Qty: 30 CAPSULE | Refills: 0 | Status: SHIPPED | OUTPATIENT
Start: 2023-07-27 | End: 2023-08-18

## 2023-07-27 NOTE — PROGRESS NOTES
Plan:     Concepcion was seen today for follow-up.    Diagnoses and all orders for this visit:    Generalized anxiety disorder with panic attacks: Continue Zoloft 50 mg daily, Buspar 5 mg daily.    Primary insomnia  -     hydrOXYzine pamoate (VISTARIL) 25 MG Cap; Take 1 capsule (25 mg total) by mouth nightly as needed (insomnia).    Essential hypertension: Continue Triamterene-HCTZ 37.5-25 mg daily. Plan to complete labs previously ordered by Dr. Mondragon on 2/18/23 - CBC, CMP, TSH, mag, lipid panel).    Other hyperlipidemia: Continue Pravastatin 40 mg daily. Plan to complete labs previously ordered by Dr. Mondragon on 2/18/23 - CBC, CMP, TSH, mag, lipid panel).    Bilateral lower extremity edema: Continue Lasix 20 mg daily. Plan to complete labs previously ordered by Dr. Mondragon on 2/18/23 - CBC, CMP, TSH, mag, lipid panel).  -     Echo; Future    Gastroesophageal reflux disease, unspecified whether esophagitis present  -     pantoprazole (PROTONIX) 40 MG tablet; Take 1 tablet (40 mg total) by mouth daily as needed (acid reflux). 40 mg po daily as needed for reflux.    Macrocytosis without anemia: Plan to complete labs previously ordered by Dr. Mondragon on 2/18/23 - CBC, CMP, TSH, mag, lipid panel).    JUANCHO on CPAP  -     hydrOXYzine pamoate (VISTARIL) 25 MG Cap; Take 1 capsule (25 mg total) by mouth nightly as needed (insomnia).    Encounter for hepatitis C screening test for low risk patient  -     HEPATITIS C ANTIBODY; Future    Encounter for screening for human immunodeficiency virus (HIV)  -     HIV 1/2 Ag/Ab (4th Gen); Future    Vaccine counseling: Insurance requires vaccines to be administered through pharmacy - recommended tdap. Pt reports already having shingles vaccine (administered in [place], [place]).       Follow up in about 6 months (around 1/27/2024), or if symptoms worsen or fail to improve.    Yecenia Daly MD  07/27/2023    Subjective:      Patient ID: Concepcion Oneill is a 63 y.o. female    Chief  Complaint   Patient presents with    Follow-up     HPI  63 y.o. female with a PMHx as documented below presents to clinic today for the following:    TEREZA w/ panic attacks:   - Zoloft 50 mg daily, Buspar 5 mg daily     Insomnia:   - Previous Rx: Trazodone 50 mg qhs (little effect)  - Pt reports difficulty falling asleep, no trouble staying asleep    HTN:  - Triamterene-HCTZ 37.5-25 mg daily     HLD:   - Pravastatin 40 mg daily     Bilateral lower extremity swelling:   - Lasix 20 mg daily    GERD:   - Protonix 40 mg daily PRN    Pt reports already having shingles vaccine (administered in [place], [place]).    ROS  Constitutional:  Negative for chills and fever.   Respiratory:  Negative for shortness of breath.    Cardiovascular:  Negative for chest pain.   Gastrointestinal:  Negative for abdominal pain, constipation, diarrhea, nausea and vomiting.     Current Outpatient Medications   Medication Instructions    albuterol (VENTOLIN HFA) 90 mcg/actuation inhaler INHALE 2 PUFFS INTO THE LUNGS EVERY 6 (SIX) HOURS AS NEEDED FOR WHEEZING. RESCUE    ascorbic acid (vitamin C) (VITAMIN C) 1,000 mg, Oral, Daily    aspirin (ECOTRIN) 81 mg, Oral, Daily    busPIRone (BUSPAR) 5 MG Tab TAKE 1 TABLET EVERY MORNING , CAN TAKE AN EXTRA DOSE DURING THE DAY AS NEEDED FOR ANXIETY AT LEAST 4 HOURS LATER .    calcium carbonate 650 mg calcium (1,625 mg) tablet 1 tablet, Oral, Daily    clotrimazole-betamethasone 1-0.05% (LOTRISONE) cream APPLY TO AFFECTED AREA TWICE A DAY FOR UP TO 2 WEEKS AS NEEDED FOR RASH    cranberry 500 mg Cap Oral, Daily    estradioL (ESTRACE) 1 g, Vaginal, Three times weekly    fluticasone propionate (FLONASE) 50 mcg/actuation nasal spray 1-2 sprays a day for congestion    furosemide (LASIX) 20 mg, Oral, Daily, Generic    glucosamine HCl/chondroitin ambrose (GLUCOSAMINE-CHONDROITIN ORAL) Oral, 2 times daily PRN    hydrOXYzine pamoate (VISTARIL) 25 mg, Oral, Nightly PRN    ketoconazole (NIZORAL) 2 % shampoo APPLY TOPICALLY ONCE  DAILY. FOR 5-7 DAYS THEN AS NEEDED (YEAST INFECTION)    Lactobacillus rhamnosus GG (CULTURELLE) 10 billion cell capsule 1 capsule, Oral, Daily    loratadine (CLARITIN) 10 mg, Oral, Daily PRN    multivitamin with minerals tablet 1 tablet, Oral, Daily    omega-3 fatty acids/fish oil (FISH OIL OMEGA 3-6-9 ORAL) Oral, 2 times daily    pantoprazole (PROTONIX) 40 mg, Oral, Daily PRN, 40 mg po daily as needed for reflux.    pravastatin (PRAVACHOL) 40 MG tablet TAKE 1 TABLET BY MOUTH EVERY DAY IN THE EVENING    sertraline (ZOLOFT) 50 MG tablet TAKE 1/2 TABLET BY MOUTH DAILY FOR 1 WEEK THEN INCREASE TO 1 TABLET DAILY AFTER    triamterene-hydrochlorothiazide 37.5-25 mg (DYAZIDE) 37.5-25 mg per capsule 1 capsule, Oral, Every morning, Take every day at noon    ubidecarenone (COQ-10 ORAL) Oral, Daily    vitamin D (VITAMIN D3) 1,000 Units, Oral, Daily      Past Medical History:   Diagnosis Date    Arthritis     Bronchitis in child     Essential hypertension 2021    Gastroesophageal reflux disease 2020    Generalized anxiety disorder with panic attacks 2022    Macrocytosis without anemia 2021    JUANCHO on CPAP 2021    Other hyperlipidemia 11/10/2020    Squamous papilloma 2022     Past Surgical History:   Procedure Laterality Date     SECTION      COLONOSCOPY      COLONOSCOPY N/A 09/15/2020    Procedure: COLONOSCOPY;  Surgeon: Phan Chakraborty MD;  Location: [facility];  Service: Endoscopy;  Laterality: N/A;    HYSTERECTOMY      w/ BSO    OOPHORECTOMY       Review of patient's allergies indicates:   Allergen Reactions    Nitrofurantoin      Sec dose led to cramps and nausea so was discontinued    Atorvastatin      Led to myalgias so was discontinued; not benefited much by Co Q10 supplementation     Family History   Problem Relation Age of Onset    Diabetes Sister     Diabetes Sister     Heart disease Maternal Aunt     Breast cancer Maternal Aunt     Colon cancer Paternal Uncle      "Cancer Other      Social History     Tobacco Use    Smoking status: Never    Smokeless tobacco: Never   Substance Use Topics    Alcohol use: Not Currently     Comment: Just for Holidays    Drug use: Never     Currently on File with [facility]:   Most Recent Immunizations   Administered Date(s) Administered    COVID-19, MRNA, LN-S, PF (Pfizer) (Purple Cap) 03/17/2021    Influenza - Quadrivalent - PF *Preferred* (6 months and older) 11/08/2022     Objective:      Vitals:    07/27/23 1302   BP: (!) 142/76   BP Location: Right arm   Patient Position: Sitting   Pulse: 88   Resp: 18   SpO2: 96%   Weight: 135 kg (297 lb 9.9 oz)   Height: 5' 3" (1.6 m)     Body mass index is 52.72 kg/m².    Physical Exam   Constitutional:       General: No acute distress.  HENT:      Head: Normocephalic and atraumatic.   Pulmonary:      Effort: Pulmonary effort is normal. No respiratory distress.   Neurological:      General: No focal deficit present.      Mental Status: Alert and oriented to person, place, and time. Mental status is at baseline.    Assessment:       1. Generalized anxiety disorder with panic attacks    2. Primary insomnia    3. Essential hypertension    4. Other hyperlipidemia    5. Bilateral lower extremity edema    6. Gastroesophageal reflux disease, unspecified whether esophagitis present    7. Macrocytosis without anemia    8. JUANCHO on CPAP    9. Encounter for hepatitis C screening test for low risk patient    10. Encounter for screening for human immunodeficiency virus (HIV)    11. Vaccine counseling        Yecenia Daly MD  [facility]  Office: [phone]   Fax: [phone]  07/27/2023      Disclaimer: This note was partly generated using dictation software which may occasionally result in transcription errors.    Total time spent on this encounter includes face to face time and non-face to face time preparing to see the patient (eg, review of tests), obtaining and/or reviewing " separately obtained history, documenting clinical information in the electronic or other health record, independently interpreting results, and communicating results to the patient/family/caregiver, or care coordinator.

## 2023-08-03 ENCOUNTER — LAB VISIT (OUTPATIENT)
Dept: LAB | Facility: HOSPITAL | Age: 63
End: 2023-08-03
Attending: INTERNAL MEDICINE
Payer: COMMERCIAL

## 2023-08-03 DIAGNOSIS — Z11.4 ENCOUNTER FOR SCREENING FOR HUMAN IMMUNODEFICIENCY VIRUS (HIV): ICD-10-CM

## 2023-08-03 DIAGNOSIS — D75.89 MACROCYTOSIS WITHOUT ANEMIA: ICD-10-CM

## 2023-08-03 DIAGNOSIS — F41.9 ANXIETY: ICD-10-CM

## 2023-08-03 DIAGNOSIS — I10 ESSENTIAL HYPERTENSION: ICD-10-CM

## 2023-08-03 DIAGNOSIS — E78.49 OTHER HYPERLIPIDEMIA: ICD-10-CM

## 2023-08-03 DIAGNOSIS — E66.01 CLASS 3 SEVERE OBESITY WITH BODY MASS INDEX (BMI) OF 50.0 TO 59.9 IN ADULT, UNSPECIFIED OBESITY TYPE, UNSPECIFIED WHETHER SERIOUS COMORBIDITY PRESENT: ICD-10-CM

## 2023-08-03 DIAGNOSIS — Z11.59 ENCOUNTER FOR HEPATITIS C SCREENING TEST FOR LOW RISK PATIENT: ICD-10-CM

## 2023-08-03 LAB
ALBUMIN SERPL BCP-MCNC: 3.7 G/DL (ref 3.5–5.2)
ALP SERPL-CCNC: 65 U/L (ref 55–135)
ALT SERPL W/O P-5'-P-CCNC: 25 U/L (ref 10–44)
ANION GAP SERPL CALC-SCNC: 8 MMOL/L (ref 8–16)
AST SERPL-CCNC: 22 U/L (ref 10–40)
BASOPHILS # BLD AUTO: 0.02 K/UL (ref 0–0.2)
BASOPHILS NFR BLD: 0.3 % (ref 0–1.9)
BILIRUB SERPL-MCNC: 0.6 MG/DL (ref 0.1–1)
BUN SERPL-MCNC: 16 MG/DL (ref 8–23)
CALCIUM SERPL-MCNC: 9 MG/DL (ref 8.7–10.5)
CHLORIDE SERPL-SCNC: 106 MMOL/L (ref 95–110)
CHOLEST SERPL-MCNC: 186 MG/DL (ref 120–199)
CHOLEST/HDLC SERPL: 4.2 {RATIO} (ref 2–5)
CO2 SERPL-SCNC: 27 MMOL/L (ref 23–29)
CREAT SERPL-MCNC: 0.8 MG/DL (ref 0.5–1.4)
DIFFERENTIAL METHOD: ABNORMAL
EOSINOPHIL # BLD AUTO: 0.1 K/UL (ref 0–0.5)
EOSINOPHIL NFR BLD: 1.1 % (ref 0–8)
ERYTHROCYTE [DISTWIDTH] IN BLOOD BY AUTOMATED COUNT: 13.3 % (ref 11.5–14.5)
EST. GFR  (NO RACE VARIABLE): >60 ML/MIN/1.73 M^2
GLUCOSE SERPL-MCNC: 99 MG/DL (ref 70–110)
HCT VFR BLD AUTO: 44.2 % (ref 37–48.5)
HCV AB SERPL QL IA: NORMAL
HDLC SERPL-MCNC: 44 MG/DL (ref 40–75)
HDLC SERPL: 23.7 % (ref 20–50)
HGB BLD-MCNC: 13.9 G/DL (ref 12–16)
HIV 1+2 AB+HIV1 P24 AG SERPL QL IA: NORMAL
IMM GRANULOCYTES # BLD AUTO: 0.02 K/UL (ref 0–0.04)
IMM GRANULOCYTES NFR BLD AUTO: 0.3 % (ref 0–0.5)
LDLC SERPL CALC-MCNC: 121.2 MG/DL (ref 63–159)
LYMPHOCYTES # BLD AUTO: 2.3 K/UL (ref 1–4.8)
LYMPHOCYTES NFR BLD: 31.7 % (ref 18–48)
MAGNESIUM SERPL-MCNC: 2.4 MG/DL (ref 1.6–2.6)
MCH RBC QN AUTO: 31.2 PG (ref 27–31)
MCHC RBC AUTO-ENTMCNC: 31.4 G/DL (ref 32–36)
MCV RBC AUTO: 99 FL (ref 82–98)
MONOCYTES # BLD AUTO: 0.6 K/UL (ref 0.3–1)
MONOCYTES NFR BLD: 8.1 % (ref 4–15)
NEUTROPHILS # BLD AUTO: 4.2 K/UL (ref 1.8–7.7)
NEUTROPHILS NFR BLD: 58.5 % (ref 38–73)
NONHDLC SERPL-MCNC: 142 MG/DL
NRBC BLD-RTO: 0 /100 WBC
PLATELET # BLD AUTO: 310 K/UL (ref 150–450)
PMV BLD AUTO: 9.9 FL (ref 9.2–12.9)
POTASSIUM SERPL-SCNC: 4.3 MMOL/L (ref 3.5–5.1)
PROT SERPL-MCNC: 6.9 G/DL (ref 6–8.4)
RBC # BLD AUTO: 4.46 M/UL (ref 4–5.4)
SODIUM SERPL-SCNC: 141 MMOL/L (ref 136–145)
TRIGL SERPL-MCNC: 104 MG/DL (ref 30–150)
TSH SERPL DL<=0.005 MIU/L-ACNC: 2.7 UIU/ML (ref 0.4–4)
WBC # BLD AUTO: 7.13 K/UL (ref 3.9–12.7)

## 2023-08-03 PROCEDURE — 87389 HIV-1 AG W/HIV-1&-2 AB AG IA: CPT | Performed by: STUDENT IN AN ORGANIZED HEALTH CARE EDUCATION/TRAINING PROGRAM

## 2023-08-03 PROCEDURE — 84443 ASSAY THYROID STIM HORMONE: CPT | Performed by: INTERNAL MEDICINE

## 2023-08-03 PROCEDURE — 80061 LIPID PANEL: CPT | Performed by: INTERNAL MEDICINE

## 2023-08-03 PROCEDURE — 83735 ASSAY OF MAGNESIUM: CPT | Performed by: INTERNAL MEDICINE

## 2023-08-03 PROCEDURE — 36415 COLL VENOUS BLD VENIPUNCTURE: CPT | Mod: PO | Performed by: STUDENT IN AN ORGANIZED HEALTH CARE EDUCATION/TRAINING PROGRAM

## 2023-08-03 PROCEDURE — 80053 COMPREHEN METABOLIC PANEL: CPT | Performed by: INTERNAL MEDICINE

## 2023-08-03 PROCEDURE — 86803 HEPATITIS C AB TEST: CPT | Performed by: STUDENT IN AN ORGANIZED HEALTH CARE EDUCATION/TRAINING PROGRAM

## 2023-08-03 PROCEDURE — 85025 COMPLETE CBC W/AUTO DIFF WBC: CPT | Performed by: INTERNAL MEDICINE

## 2023-08-08 ENCOUNTER — CLINICAL SUPPORT (OUTPATIENT)
Dept: CARDIOLOGY | Facility: HOSPITAL | Age: 63
End: 2023-08-08
Attending: STUDENT IN AN ORGANIZED HEALTH CARE EDUCATION/TRAINING PROGRAM
Payer: COMMERCIAL

## 2023-08-08 VITALS — BODY MASS INDEX: 51.91 KG/M2 | WEIGHT: 293 LBS | HEIGHT: 63 IN

## 2023-08-08 DIAGNOSIS — R60.0 BILATERAL LOWER EXTREMITY EDEMA: ICD-10-CM

## 2023-08-08 LAB
ASCENDING AORTA: 2.97 CM
AV INDEX (PROSTH): 0.76
AV MEAN GRADIENT: 5 MMHG
AV PEAK GRADIENT: 9 MMHG
AV VALVE AREA BY VELOCITY RATIO: 2.37 CM²
AV VALVE AREA: 2.73 CM²
AV VELOCITY RATIO: 0.66
BSA FOR ECHO PROCEDURE: 2.45 M2
CV ECHO LV RWT: 0.4 CM
DOP CALC AO PEAK VEL: 1.53 M/S
DOP CALC AO VTI: 33.4 CM
DOP CALC LVOT AREA: 3.6 CM2
DOP CALC LVOT DIAMETER: 2.14 CM
DOP CALC LVOT PEAK VEL: 1.01 M/S
DOP CALC LVOT STROKE VOLUME: 91.31 CM3
DOP CALCLVOT PEAK VEL VTI: 25.4 CM
E WAVE DECELERATION TIME: 173.33 MSEC
E/A RATIO: 0.96
E/E' RATIO: 8.42 M/S
ECHO LV POSTERIOR WALL: 1 CM (ref 0.6–1.1)
FRACTIONAL SHORTENING: 41 % (ref 28–44)
INTERVENTRICULAR SEPTUM: 0.81 CM (ref 0.6–1.1)
IVRT: 89.44 MSEC
LA MAJOR: 5.42 CM
LA MINOR: 5.93 CM
LA WIDTH: 4.7 CM
LEFT ATRIUM SIZE: 4.34 CM
LEFT ATRIUM VOLUME INDEX: 42.9 ML/M2
LEFT ATRIUM VOLUME: 98.2 CM3
LEFT INTERNAL DIMENSION IN SYSTOLE: 2.94 CM (ref 2.1–4)
LEFT VENTRICLE DIASTOLIC VOLUME INDEX: 50.41 ML/M2
LEFT VENTRICLE DIASTOLIC VOLUME: 115.44 ML
LEFT VENTRICLE MASS INDEX: 68 G/M2
LEFT VENTRICLE SYSTOLIC VOLUME INDEX: 14.5 ML/M2
LEFT VENTRICLE SYSTOLIC VOLUME: 33.19 ML
LEFT VENTRICULAR INTERNAL DIMENSION IN DIASTOLE: 4.95 CM (ref 3.5–6)
LEFT VENTRICULAR MASS: 156.71 G
LV LATERAL E/E' RATIO: 8 M/S
LV SEPTAL E/E' RATIO: 8.89 M/S
LVOT MG: 2.17 MMHG
LVOT MV: 0.68 CM/S
MV PEAK A VEL: 0.83 M/S
MV PEAK E VEL: 0.8 M/S
PISA TR MAX VEL: 1.3 M/S
PULM VEIN S/D RATIO: 1.38
PV PEAK D VEL: 0.53 M/S
PV PEAK S VEL: 0.73 M/S
RA MAJOR: 5.01 CM
RA PRESSURE ESTIMATED: 3 MMHG
RA WIDTH: 3.1 CM
RIGHT VENTRICULAR END-DIASTOLIC DIMENSION: 4.42 CM
RIGHT VENTRICULAR LENGTH IN DIASTOLE (APICAL 4-CHAMBER VIEW): 8.35 CM
RV MID DIAMA: 3.09 CM
RV TB RVSP: 4 MMHG
RV TISSUE DOPPLER FREE WALL SYSTOLIC VELOCITY 1 (APICAL 4 CHAMBER VIEW): 15.82 CM/S
SINUS: 3.4 CM
STJ: 2.65 CM
TDI LATERAL: 0.1 M/S
TDI SEPTAL: 0.09 M/S
TDI: 0.1 M/S
TR MAX PG: 7 MMHG
TRICUSPID ANNULAR PLANE SYSTOLIC EXCURSION: 2.34 CM
TV REST PULMONARY ARTERY PRESSURE: 10 MMHG
Z-SCORE OF LEFT VENTRICULAR DIMENSION IN END DIASTOLE: -5.69
Z-SCORE OF LEFT VENTRICULAR DIMENSION IN END SYSTOLE: -4.63

## 2023-08-08 PROCEDURE — 93306 TTE W/DOPPLER COMPLETE: CPT | Mod: 26,,, | Performed by: INTERNAL MEDICINE

## 2023-08-08 PROCEDURE — 93306 ECHO (CUPID ONLY): ICD-10-PCS | Mod: 26,,, | Performed by: INTERNAL MEDICINE

## 2023-08-08 PROCEDURE — 93306 TTE W/DOPPLER COMPLETE: CPT | Mod: PO

## 2023-08-18 DIAGNOSIS — G47.33 OSA ON CPAP: Chronic | ICD-10-CM

## 2023-08-18 DIAGNOSIS — F51.01 PRIMARY INSOMNIA: ICD-10-CM

## 2023-08-18 RX ORDER — HYDROXYZINE PAMOATE 25 MG/1
25 CAPSULE ORAL NIGHTLY PRN
Qty: 30 CAPSULE | Refills: 0 | Status: SHIPPED | OUTPATIENT
Start: 2023-08-18 | End: 2023-09-05

## 2023-08-31 ENCOUNTER — TELEPHONE (OUTPATIENT)
Dept: FAMILY MEDICINE | Facility: CLINIC | Age: 63
End: 2023-08-31

## 2023-08-31 ENCOUNTER — TELEPHONE (OUTPATIENT)
Dept: FAMILY MEDICINE | Facility: CLINIC | Age: 63
End: 2023-08-31
Payer: COMMERCIAL

## 2023-08-31 DIAGNOSIS — M79.10 MYALGIA: ICD-10-CM

## 2023-08-31 DIAGNOSIS — E78.5 DYSLIPIDEMIA: ICD-10-CM

## 2023-08-31 DIAGNOSIS — E78.49 OTHER HYPERLIPIDEMIA: ICD-10-CM

## 2023-08-31 RX ORDER — PRAVASTATIN SODIUM 40 MG/1
40 TABLET ORAL NIGHTLY
Qty: 90 TABLET | Refills: 2 | Status: SHIPPED | OUTPATIENT
Start: 2023-08-31

## 2023-08-31 NOTE — TELEPHONE ENCOUNTER
Please notify patient I have reviewed her lab results and she could benefit from adhering to low-fat high-fiber diet a bit better and include regular exercise is in her weekly routine with a goal 5 times a week minimum 30 minutes; with continued attempts at weight reduction.  Cholesterol worsened to 186 and LDL or bad cholesterol 02/01/2021.  Continue taking the pravastatin 40 mg every evening which I have renewed for her.  A Western Mediterranean diet would help...  Continued attempts at weight reduction to be inclusive of regular exercise as well  Please inform patient that I have retired from [facility] and would like her to see Dr. Daly to establish care with her sometime in the next two months as her new PCP.  Please let me know if this someone else she would prefer as another provider and I will make the appropriate changes

## 2023-08-31 NOTE — TELEPHONE ENCOUNTER
No answer LVM for Pt stating to return call to clinic to schedule appointment with Malika as new Primary Physician

## 2023-08-31 NOTE — TELEPHONE ENCOUNTER
----- Message from Ryann De Dios sent at 8/31/2023  1:49 PM CDT -----  Type:  Patient Returning Call         Who Called: pt         Who Left Message for Patient: Princess FOSTER         Does the patient know what this is regarding? Est care with Yecenia Daly          Best Call Back Number: [phone] (home)            Additional Information:

## 2023-09-05 DIAGNOSIS — G47.33 OSA ON CPAP: Chronic | ICD-10-CM

## 2023-09-05 DIAGNOSIS — F51.01 PRIMARY INSOMNIA: ICD-10-CM

## 2023-09-05 RX ORDER — HYDROXYZINE PAMOATE 25 MG/1
25 CAPSULE ORAL NIGHTLY PRN
Qty: 90 CAPSULE | Refills: 1 | Status: SHIPPED | OUTPATIENT
Start: 2023-09-05 | End: 2024-03-03

## 2023-09-11 ENCOUNTER — OFFICE VISIT (OUTPATIENT)
Dept: FAMILY MEDICINE | Facility: CLINIC | Age: 63
End: 2023-09-11
Payer: COMMERCIAL

## 2023-09-11 VITALS
WEIGHT: 293 LBS | OXYGEN SATURATION: 98 % | BODY MASS INDEX: 51.91 KG/M2 | SYSTOLIC BLOOD PRESSURE: 136 MMHG | HEART RATE: 73 BPM | RESPIRATION RATE: 16 BRPM | DIASTOLIC BLOOD PRESSURE: 72 MMHG | HEIGHT: 63 IN

## 2023-09-11 DIAGNOSIS — R59.0 LOCALIZED ENLARGED LYMPH NODES: ICD-10-CM

## 2023-09-11 DIAGNOSIS — M54.2 NECK PAIN: Primary | ICD-10-CM

## 2023-09-11 PROCEDURE — 3078F DIAST BP <80 MM HG: CPT | Mod: CPTII,S$GLB,,

## 2023-09-11 PROCEDURE — 3008F PR BODY MASS INDEX (BMI) DOCUMENTED: ICD-10-PCS | Mod: CPTII,S$GLB,,

## 2023-09-11 PROCEDURE — 3075F SYST BP GE 130 - 139MM HG: CPT | Mod: CPTII,S$GLB,,

## 2023-09-11 PROCEDURE — 1159F MED LIST DOCD IN RCRD: CPT | Mod: CPTII,S$GLB,,

## 2023-09-11 PROCEDURE — 3008F BODY MASS INDEX DOCD: CPT | Mod: CPTII,S$GLB,,

## 2023-09-11 PROCEDURE — 99214 PR OFFICE/OUTPT VISIT, EST, LEVL IV, 30-39 MIN: ICD-10-PCS | Mod: S$GLB,,,

## 2023-09-11 PROCEDURE — 3078F PR MOST RECENT DIASTOLIC BLOOD PRESSURE < 80 MM HG: ICD-10-PCS | Mod: CPTII,S$GLB,,

## 2023-09-11 PROCEDURE — 3075F PR MOST RECENT SYSTOLIC BLOOD PRESS GE 130-139MM HG: ICD-10-PCS | Mod: CPTII,S$GLB,,

## 2023-09-11 PROCEDURE — 1159F PR MEDICATION LIST DOCUMENTED IN MEDICAL RECORD: ICD-10-PCS | Mod: CPTII,S$GLB,,

## 2023-09-11 PROCEDURE — 99999 PR PBB SHADOW E&M-EST. PATIENT-LVL V: CPT | Mod: PBBFAC,,,

## 2023-09-11 PROCEDURE — 99214 OFFICE O/P EST MOD 30 MIN: CPT | Mod: S$GLB,,,

## 2023-09-11 PROCEDURE — 99999 PR PBB SHADOW E&M-EST. PATIENT-LVL V: ICD-10-PCS | Mod: PBBFAC,,,

## 2023-09-11 NOTE — PROGRESS NOTES
[facility]  [address] [place] [address]    Subjective    Chief Complaint:   Chief Complaint   Patient presents with    Pain     Patient states she has been having an achy pain in her neck and head but states it does not feel like a normal headache.       History of Present Illness:     Concepcion Oneill is a(n) 63 y.o. female with past medical history as noted below who presents to the clinic today for one-week onset neck pain.    Pain is present to the L anterior neck, with radiation of pain into ear and posterior neck. No fever. No history of thyroid disease. Reports slight odynophagia. Also reports intermittent left ear pain. No recent upper respiratory illnesses.  No recent known odontogenic infection.      Problem List:   Patient Active Problem List   Diagnosis    Obesity    Gastroesophageal reflux disease    Other hyperlipidemia    Family history of diabetes mellitus (DM)    JUANCHO on CPAP    Macrocytosis without anemia    Family history of hypertension    Family history of hyperlipidemia    Essential hypertension    Generalized anxiety disorder with panic attacks    Squamous papilloma    Nasal lesion       Current Outpatient Medications:   Current Outpatient Medications   Medication Instructions    albuterol (VENTOLIN HFA) 90 mcg/actuation inhaler INHALE 2 PUFFS INTO THE LUNGS EVERY 6 (SIX) HOURS AS NEEDED FOR WHEEZING. RESCUE    ascorbic acid (vitamin C) (VITAMIN C) 1,000 mg, Oral, Daily    aspirin (ECOTRIN) 81 mg, Oral, Daily    busPIRone (BUSPAR) 5 MG Tab TAKE 1 TABLET EVERY MORNING , CAN TAKE AN EXTRA DOSE DURING THE DAY AS NEEDED FOR ANXIETY AT LEAST 4 HOURS LATER .    calcium carbonate 650 mg calcium (1,625 mg) tablet 1 tablet, Oral, Daily    clotrimazole-betamethasone 1-0.05% (LOTRISONE) cream APPLY TO AFFECTED AREA TWICE A DAY FOR UP TO 2 WEEKS AS NEEDED FOR RASH    cranberry 500 mg Cap Oral, Daily    estradioL (ESTRACE) 1 g, Vaginal, Three times  weekly    fluticasone propionate (FLONASE) 50 mcg/actuation nasal spray 1-2 sprays a day for congestion    furosemide (LASIX) 20 mg, Oral, Daily, Generic    glucosamine HCl/chondroitin ambrose (GLUCOSAMINE-CHONDROITIN ORAL) Oral, 2 times daily PRN    hydrOXYzine pamoate (VISTARIL) 25 mg, Oral, Nightly PRN    ketoconazole (NIZORAL) 2 % shampoo APPLY TOPICALLY ONCE DAILY. FOR 5-7 DAYS THEN AS NEEDED (YEAST INFECTION)    Lactobacillus rhamnosus GG (CULTURELLE) 10 billion cell capsule 1 capsule, Oral, Daily    loratadine (CLARITIN) 10 mg, Oral, Daily PRN    multivitamin with minerals tablet 1 tablet, Oral, Daily    omega-3 fatty acids/fish oil (FISH OIL OMEGA 3-6-9 ORAL) Oral, 2 times daily    pantoprazole (PROTONIX) 40 mg, Oral, Daily PRN, 40 mg po daily as needed for reflux.    pravastatin (PRAVACHOL) 40 mg, Oral, Nightly, Generic please    sertraline (ZOLOFT) 50 MG tablet TAKE 1/2 TABLET BY MOUTH DAILY FOR 1 WEEK THEN INCREASE TO 1 TABLET DAILY AFTER    triamterene-hydrochlorothiazide 37.5-25 mg (DYAZIDE) 37.5-25 mg per capsule 1 capsule, Oral, Every morning, Take every day at noon    ubidecarenone (COQ-10 ORAL) Oral, Daily    vitamin D (VITAMIN D3) 1,000 Units, Oral, Daily       Surgical History:   Past Surgical History:   Procedure Laterality Date     SECTION      COLONOSCOPY      COLONOSCOPY N/A 09/15/2020    Procedure: COLONOSCOPY;  Surgeon: Phan Chakraborty MD;  Location[facility];  Service: Endoscopy;  Laterality: N/A;    HYSTERECTOMY      w/ BSO    OOPHORECTOMY         Family History:   Family History   Problem Relation Age of Onset    Diabetes Sister     Diabetes Sister     Heart disease Maternal Aunt     Breast cancer Maternal Aunt     Colon cancer Paternal Uncle     Cancer Other        Allergies:   Review of patient's allergies indicates:   Allergen Reactions    Nitrofurantoin      Sec dose led to cramps and nausea so was discontinued    Atorvastatin      Led to myalgias so was discontinued;  "not benefited much by Co Q10 supplementation       Tobacco Status:   Tobacco Use: Low Risk  (9/11/2023)    Patient History     Smoking Tobacco Use: Never     Smokeless Tobacco Use: Never     Passive Exposure: Not on file       Sexual Activity:   Social History     Substance and Sexual Activity   Sexual Activity Yes    Partners: Male       Alcohol Use:   Social History     Substance and Sexual Activity   Alcohol Use Not Currently    Comment: Just for Holidays         Objective       Vitals:    09/11/23 1511   BP: 136/72   BP Location: Left arm   Patient Position: Sitting   Pulse: 73   Resp: 16   SpO2: 98%   Weight: (!) 137.5 kg (303 lb 2.1 oz)   Height: 5' 3" (1.6 m)       Review of Systems   Constitutional:  Negative for chills and fever.   HENT:  Positive for ear pain. Negative for congestion, ear discharge, hearing loss, sinus pain and sore throat.    Respiratory:  Negative for cough and shortness of breath.    Cardiovascular:  Negative for chest pain.   Musculoskeletal:  Positive for neck pain.       Physical Exam  Constitutional:       General: She is not in acute distress.     Appearance: Normal appearance.   HENT:      Head: Normocephalic and atraumatic.      Mouth/Throat:      Mouth: Mucous membranes are moist.      Pharynx: Oropharynx is clear. Uvula midline. No pharyngeal swelling, oropharyngeal exudate, posterior oropharyngeal erythema or uvula swelling.      Tonsils: No tonsillar exudate or tonsillar abscesses.      Comments: No obvious odontogenic infection/abscess  Neck:      Thyroid: No thyroid mass, thyromegaly or thyroid tenderness.      Trachea: Trachea and phonation normal.   Cardiovascular:      Rate and Rhythm: Normal rate and regular rhythm.      Heart sounds: Normal heart sounds. No murmur heard.  Pulmonary:      Effort: Pulmonary effort is normal. No respiratory distress.      Breath sounds: Normal breath sounds. No wheezing.   Musculoskeletal:      Cervical back: Full passive range of motion " without pain, normal range of motion and neck supple. No edema or erythema. Normal range of motion.   Lymphadenopathy:      Cervical: No cervical adenopathy.   Skin:     General: Skin is warm.   Neurological:      Mental Status: She is alert and oriented to person, place, and time.   Psychiatric:         Behavior: Behavior normal.           Assessment and Plan:    1. Neck pain  -     CT Soft Tissue Neck With Contrast; Future; Expected date: 09/11/2023    2. Localized enlarged lymph nodes  -     CT Soft Tissue Neck With Contrast; Future; Expected date: 09/11/2023  -     BASIC METABOLIC PANEL; Future; Expected date: 09/11/2023        Visit summary:    Concepcion Oneill presented today for neck pain.    Unclear cause of symptoms today.  Broad differential diagnosis includes lymphadenopathy, thyroiditis, thyroid nodule, odontogenic infection, parotitis.  Less likely but do not want to miss deep neck space infection.  No signs of systemic symptoms, although she does report some odynophagia.  Ordered CT head and neck    Patient was instructed to report to ER if symptoms become severe.    Follow up:  Pending results of CT scan      Kaylynn Vernon PA-C    This note was created partially with voice dictation software and is prone to errors. This note has been reviewed by me but some errors are inevitable.

## 2023-09-14 ENCOUNTER — LAB VISIT (OUTPATIENT)
Dept: LAB | Facility: HOSPITAL | Age: 63
End: 2023-09-14
Payer: COMMERCIAL

## 2023-09-14 DIAGNOSIS — R59.0 LOCALIZED ENLARGED LYMPH NODES: ICD-10-CM

## 2023-09-14 LAB
ANION GAP SERPL CALC-SCNC: 12 MMOL/L (ref 8–16)
BUN SERPL-MCNC: 18 MG/DL (ref 8–23)
CALCIUM SERPL-MCNC: 9.2 MG/DL (ref 8.7–10.5)
CHLORIDE SERPL-SCNC: 104 MMOL/L (ref 95–110)
CO2 SERPL-SCNC: 25 MMOL/L (ref 23–29)
CREAT SERPL-MCNC: 0.8 MG/DL (ref 0.5–1.4)
EST. GFR  (NO RACE VARIABLE): >60 ML/MIN/1.73 M^2
GLUCOSE SERPL-MCNC: 95 MG/DL (ref 70–110)
POTASSIUM SERPL-SCNC: 4.1 MMOL/L (ref 3.5–5.1)
SODIUM SERPL-SCNC: 141 MMOL/L (ref 136–145)

## 2023-09-14 PROCEDURE — 36415 COLL VENOUS BLD VENIPUNCTURE: CPT | Mod: PO

## 2023-09-14 PROCEDURE — 80048 BASIC METABOLIC PNL TOTAL CA: CPT

## 2023-09-21 ENCOUNTER — TELEPHONE (OUTPATIENT)
Dept: FAMILY MEDICINE | Facility: CLINIC | Age: 63
End: 2023-09-21
Payer: COMMERCIAL

## 2023-09-21 ENCOUNTER — HOSPITAL ENCOUNTER (OUTPATIENT)
Dept: RADIOLOGY | Facility: HOSPITAL | Age: 63
Discharge: HOME | End: 2023-09-21
Payer: COMMERCIAL

## 2023-09-21 ENCOUNTER — OFFICE VISIT (OUTPATIENT)
Dept: OTOLARYNGOLOGY | Facility: CLINIC | Age: 63
End: 2023-09-21
Payer: COMMERCIAL

## 2023-09-21 VITALS — BODY MASS INDEX: 51.91 KG/M2 | HEIGHT: 63 IN | WEIGHT: 293 LBS

## 2023-09-21 DIAGNOSIS — R59.0 LOCALIZED ENLARGED LYMPH NODES: ICD-10-CM

## 2023-09-21 DIAGNOSIS — M54.2 NECK PAIN: ICD-10-CM

## 2023-09-21 DIAGNOSIS — J36 PERITONSILLAR ABSCESS: Primary | ICD-10-CM

## 2023-09-21 PROCEDURE — 99999 PR PBB SHADOW E&M-EST. PATIENT-LVL IV: ICD-10-PCS | Mod: PBBFAC,,, | Performed by: STUDENT IN AN ORGANIZED HEALTH CARE EDUCATION/TRAINING PROGRAM

## 2023-09-21 PROCEDURE — 99214 OFFICE O/P EST MOD 30 MIN: CPT | Mod: 25,S$GLB,, | Performed by: STUDENT IN AN ORGANIZED HEALTH CARE EDUCATION/TRAINING PROGRAM

## 2023-09-21 PROCEDURE — 42700 I&D ABSCESS PERITONSILLAR: CPT | Mod: S$GLB,,, | Performed by: STUDENT IN AN ORGANIZED HEALTH CARE EDUCATION/TRAINING PROGRAM

## 2023-09-21 PROCEDURE — 70491 CT SOFT TISSUE NECK W/DYE: CPT | Mod: 26,,, | Performed by: RADIOLOGY

## 2023-09-21 PROCEDURE — 3008F PR BODY MASS INDEX (BMI) DOCUMENTED: ICD-10-PCS | Mod: CPTII,S$GLB,, | Performed by: STUDENT IN AN ORGANIZED HEALTH CARE EDUCATION/TRAINING PROGRAM

## 2023-09-21 PROCEDURE — 3008F BODY MASS INDEX DOCD: CPT | Mod: CPTII,S$GLB,, | Performed by: STUDENT IN AN ORGANIZED HEALTH CARE EDUCATION/TRAINING PROGRAM

## 2023-09-21 PROCEDURE — 1159F MED LIST DOCD IN RCRD: CPT | Mod: CPTII,S$GLB,, | Performed by: STUDENT IN AN ORGANIZED HEALTH CARE EDUCATION/TRAINING PROGRAM

## 2023-09-21 PROCEDURE — 42700 PR INC/DRAIN PERITONSIL ABSCESS: ICD-10-PCS | Mod: S$GLB,,, | Performed by: STUDENT IN AN ORGANIZED HEALTH CARE EDUCATION/TRAINING PROGRAM

## 2023-09-21 PROCEDURE — 25500020 PHARM REV CODE 255: Mod: PO

## 2023-09-21 PROCEDURE — 1159F PR MEDICATION LIST DOCUMENTED IN MEDICAL RECORD: ICD-10-PCS | Mod: CPTII,S$GLB,, | Performed by: STUDENT IN AN ORGANIZED HEALTH CARE EDUCATION/TRAINING PROGRAM

## 2023-09-21 PROCEDURE — 99214 PR OFFICE/OUTPT VISIT, EST, LEVL IV, 30-39 MIN: ICD-10-PCS | Mod: 25,S$GLB,, | Performed by: STUDENT IN AN ORGANIZED HEALTH CARE EDUCATION/TRAINING PROGRAM

## 2023-09-21 PROCEDURE — 70491 CT SOFT TISSUE NECK WITH CONTRAST: ICD-10-PCS | Mod: 26,,, | Performed by: RADIOLOGY

## 2023-09-21 PROCEDURE — 99999 PR PBB SHADOW E&M-EST. PATIENT-LVL IV: CPT | Mod: PBBFAC,,, | Performed by: STUDENT IN AN ORGANIZED HEALTH CARE EDUCATION/TRAINING PROGRAM

## 2023-09-21 PROCEDURE — 70491 CT SOFT TISSUE NECK W/DYE: CPT | Mod: TC,PO

## 2023-09-21 PROCEDURE — 87070 CULTURE OTHR SPECIMN AEROBIC: CPT | Performed by: STUDENT IN AN ORGANIZED HEALTH CARE EDUCATION/TRAINING PROGRAM

## 2023-09-21 RX ORDER — DEXAMETHASONE 4 MG/1
8 TABLET ORAL DAILY
Qty: 10 TABLET | Refills: 0 | Status: SHIPPED | OUTPATIENT
Start: 2023-09-21 | End: 2023-09-26

## 2023-09-21 RX ORDER — AMOXICILLIN AND CLAVULANATE POTASSIUM 875; 125 MG/1; MG/1
1 TABLET, FILM COATED ORAL EVERY 12 HOURS
Qty: 20 TABLET | Refills: 0 | Status: SHIPPED | OUTPATIENT
Start: 2023-09-21 | End: 2023-09-21

## 2023-09-21 RX ORDER — AMOXICILLIN AND CLAVULANATE POTASSIUM 875; 125 MG/1; MG/1
1 TABLET, FILM COATED ORAL EVERY 12 HOURS
Qty: 20 TABLET | Refills: 0 | Status: SHIPPED | OUTPATIENT
Start: 2023-09-21 | End: 2023-10-01

## 2023-09-21 RX ORDER — OXYCODONE AND ACETAMINOPHEN 10; 325 MG/1; MG/1
1 TABLET ORAL EVERY 8 HOURS PRN
Qty: 21 TABLET | Refills: 0 | Status: SHIPPED | OUTPATIENT
Start: 2023-09-21 | End: 2024-03-06

## 2023-09-21 RX ORDER — DEXAMETHASONE 6 MG/1
12 TABLET ORAL 2 TIMES DAILY WITH MEALS
Qty: 40 TABLET | Refills: 0 | Status: SHIPPED | OUTPATIENT
Start: 2023-09-21 | End: 2023-09-21

## 2023-09-21 RX ADMIN — IOHEXOL 75 ML: 350 INJECTION, SOLUTION INTRAVENOUS at 09:09

## 2023-09-21 NOTE — TELEPHONE ENCOUNTER
[facility]  [address]  [place] [address]      Orders Only Encounter     Patient: Concepcion Oneill  YOB: 1960    Summary:    CT with likely peritonsillar abscess. Urgent referral to ENT, was able to get appt today w Dr. Warner 1:20 pm. Pain medication to be taken prior. She will be driven to and from appt by family member. No questions at this time       Orders Placed This Encounter:    1. Peritonsillar abscess  -     Ambulatory referral/consult to ENT; Future; Expected date: 09/28/2023  -     amoxicillin-clavulanate 875-125mg (AUGMENTIN) 875-125 mg per tablet; Take 1 tablet by mouth every 12 (twelve) hours. for 10 days  Dispense: 20 tablet; Refill: 0        Kaylynn Vernon PA-C

## 2023-09-21 NOTE — PROGRESS NOTES
Otolaryngology Clinic Note    Subjective:       Patient ID: Concepcion Oneill is a 63 y.o. female.    Chief Complaint: Mass      History of Present Illness: Concepcion Oneill is a 63 y.o. female presenting with neck pain and ear pain on left. Saw PCP  for this. Ct today with PTA. No fever, no chills, no dysphagia, dyspnea. Some odynophagia. No recent upper respiratory illnesses.  No recent known odontogenic infection. She did have sore throat prior to this starting but not tx. Has grandkids who have been sick. Took pain meds prior to procedure. No abx or steroids.       Past Surgical History:   Procedure Laterality Date     SECTION      COLONOSCOPY      COLONOSCOPY N/A 09/15/2020    Procedure: COLONOSCOPY;  Surgeon: Phan Chakraborty MD;  Location: [facility];  Service: Endoscopy;  Laterality: N/A;    HYSTERECTOMY      w/ BSO    OOPHORECTOMY       Past Medical History:   Diagnosis Date    Arthritis     Bronchitis in child     Essential hypertension 2021    Gastroesophageal reflux disease 2020    Generalized anxiety disorder with panic attacks 2022    Macrocytosis without anemia 2021    JUANCHO on CPAP 2021    Other hyperlipidemia 11/10/2020    Squamous papilloma 2022     Social Determinants of Health     Tobacco Use: Low Risk  (2023)    Patient History     Smoking Tobacco Use: Never     Smokeless Tobacco Use: Never     Passive Exposure: Not on file   Alcohol Use: Not on file   Financial Resource Strain: Not on file   Food Insecurity: Not on file   Transportation Needs: Not on file   Physical Activity: Not on file   Stress: No Stress Concern Present (2020)    Citizen of [place] [employer] - Occupational Stress Questionnaire     Feeling of Stress : Only a little   Social Connections: Not on file   Housing Stability: Not on file   Depression: Low Risk  (2023)    Depression     Last PHQ-4: Flowsheet Data: 0     Review of patient's allergies  indicates:   Allergen Reactions    Nitrofurantoin      Sec dose led to cramps and nausea so was discontinued    Atorvastatin      Led to myalgias so was discontinued; not benefited much by Co Q10 supplementation     Current Outpatient Medications   Medication Instructions    albuterol (VENTOLIN HFA) 90 mcg/actuation inhaler INHALE 2 PUFFS INTO THE LUNGS EVERY 6 (SIX) HOURS AS NEEDED FOR WHEEZING. RESCUE    amoxicillin-clavulanate 875-125mg (AUGMENTIN) 875-125 mg per tablet 1 tablet, Oral, Every 12 hours    ascorbic acid (vitamin C) (VITAMIN C) 1,000 mg, Oral, Daily    aspirin (ECOTRIN) 81 mg, Oral, Daily    busPIRone (BUSPAR) 5 MG Tab TAKE 1 TABLET EVERY MORNING , CAN TAKE AN EXTRA DOSE DURING THE DAY AS NEEDED FOR ANXIETY AT LEAST 4 HOURS LATER .    calcium carbonate 650 mg calcium (1,625 mg) tablet 1 tablet, Oral, Daily    clotrimazole-betamethasone 1-0.05% (LOTRISONE) cream APPLY TO AFFECTED AREA TWICE A DAY FOR UP TO 2 WEEKS AS NEEDED FOR RASH    cranberry 500 mg Cap Oral, Daily    dexAMETHasone (DECADRON) 8 mg, Oral, Daily    estradioL (ESTRACE) 1 g, Vaginal, Three times weekly    fluticasone propionate (FLONASE) 50 mcg/actuation nasal spray 1-2 sprays a day for congestion    furosemide (LASIX) 20 mg, Oral, Daily, Generic    glucosamine HCl/chondroitin ambrose (GLUCOSAMINE-CHONDROITIN ORAL) Oral, 2 times daily PRN    hydrOXYzine pamoate (VISTARIL) 25 mg, Oral, Nightly PRN    ketoconazole (NIZORAL) 2 % shampoo APPLY TOPICALLY ONCE DAILY. FOR 5-7 DAYS THEN AS NEEDED (YEAST INFECTION)    Lactobacillus rhamnosus GG (CULTURELLE) 10 billion cell capsule 1 capsule, Oral, Daily    loratadine (CLARITIN) 10 mg, Oral, Daily PRN    multivitamin with minerals tablet 1 tablet, Oral, Daily    omega-3 fatty acids/fish oil (FISH OIL OMEGA 3-6-9 ORAL) Oral, 2 times daily    oxyCODONE-acetaminophen (PERCOCET)  mg per tablet 1 tablet, Oral, Every 8 hours PRN    pantoprazole (PROTONIX) 40 mg, Oral, Daily PRN, 40 mg po daily as  needed for reflux.    pravastatin (PRAVACHOL) 40 mg, Oral, Nightly, Generic please    sertraline (ZOLOFT) 50 MG tablet TAKE 1/2 TABLET BY MOUTH DAILY FOR 1 WEEK THEN INCREASE TO 1 TABLET DAILY AFTER    triamterene-hydrochlorothiazide 37.5-25 mg (DYAZIDE) 37.5-25 mg per capsule 1 capsule, Oral, Every morning, Take every day at noon    ubidecarenone (COQ-10 ORAL) Oral, Daily    vitamin D (VITAMIN D3) 1,000 Units, Oral, Daily         ENT ROS negative except as stated above.     Patient answers are not available for this visit.            Objective:      There were no vitals filed for this visit.    General: NAD, well appearing  Eyes: Normal conjunctiva and lids  Face: symmetric, nerve intact  Nose: The nose is without any evidence of any deformity. The nasal mucosa is moist. The septum is midline. There is no evidence of septal hematoma. The turbinates are without abnormality.   Ears: The ears are with normal-appearing pinna. Examination of the canals is normal appearing bilaterally. There is no drainage or erythema noted. The tympanic membranes are normal appearing with pearly color, normal-appearing landmarks and normal light reflex. Hearing is grossly intact.  Mouth: No obvious abnormalities to the lips. The teeth are unremarkable. The gingivae are without any obvious evidence of infection or lesion. The oral mucosa is moist and pink. There are no obvious masses to the hard or soft palate.   Oropharynx: The uvula is midline.  The tongue is midline. The posterior pharynx is without erythema or exudate. The tonsils are absent. No uvular deviation, no obvious palatal fullness.  Salivary glands: The salivary glands are symmetric and not enlarged, no masses  Neck: No lymphadenopathy, trachea midline, thryoid not enlarged. Level level II mildly swollen and tender.   Psych: Normal mood and affect.   Neuro: Grossly intact  Speech: fluent    Test Review: I personally reviewed CT with L PTA 3 cm    EXAMINATION:  CT soft  tissue neck with contrast     CLINICAL HISTORY:  Lymphadenopathy.  Left-sided neck pain     TECHNIQUE:  Contrast-enhanced soft tissue neck CT was performed after the uneventful intravenous administration of 75 ml Omnipaque 350.  Sagittal and coronal reformatted images were created.  The study is reviewed in bone and soft tissue windows.     COMPARISON:  None     FINDINGS:  Skull base and brain: This is a limited evaluation of the brain but the basilar cisterns are open.  The 4th ventricle is normal in size and position.  The vessels at the base of the brain enhance normally.     Soft tissue: Fat and soft tissue planes in the neck are preserved.     Parotid/Submandibular glands: The parotid and submandibular glands appear normal and symmetric without mass, ductal dilatation or calcification.     Thyroid gland: The thyroid gland is grossly normal in size and enhancement without focal nodular disease or calcification identified.     Larynx, pharynx: There is a low-density rim enhancing collection in the left peritonsillar soft tissues measuring approximately 2.3 x 2.0 x 3.0 cm (AP, transverse, craniocaudad).  The imaging findings are most suggestive of a peritonsillar abscess.  There is also mild prominence in the posterior nasopharyngeal soft tissues on the left which may represent reactive edema.  The epiglottis appears normal.  The vallecular and piriform sinuses are normal as are the vocal cords.     Lymph nodes: Shotty lymph nodes are present along the internal jugular chain bilaterally as well as in the submandibular spaces.  There is no pathologically enlarged or necrotic lymph node.     Vasculature: The vessels in the neck enhance normally.  The internal carotid arteries take a somewhat medial retropharyngeal course.  This represents normal developmental variation.     Airway, lungs: There is mild narrowing of the airway at the level of the tonsils related to the left Nneka tonsillar process.  The included upper  lungs are clear.     Sinus/mastoid: There are probable mucous retention cysts in the floor both maxillary sinuses.  Otherwise, the included paranasal sinuses and mastoid air cells are clear.  The nasal septum is deviated to the left.  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     Bones: There is multilevel degenerative change in the cervical spine with marked disc space narrowing at the C4-5, C5-6 and C6-7 levels where there is endplate osteophyte formation.  Vertebral body height and alignment remain normal.     Impression:     1. The study is abnormal.  There is a low-density rim enhancing structure in the left peritonsillar soft tissues measuring approximately 2.3 x 2.0 x 3.0 cm most suggestive of a peritonsillar abscess.  There is no other acute or significant abnormality.  2. There is multilevel degenerative change in the cervical spine.  3. Incidentally, the internal carotid arteries in the neck take a somewhat medial retropharyngeal course.  This represents normal developmental variation but could be important in any pre-surgical planning.  This report was flagged in Epic as abnormal.        Electronically signed by: Salvador Rodriges MD  Date:                                            09/21/2023  Time:                                           10:22      PROCEDURE:  Peritonsillar abscess drainage on left  Indications: 3 cm PTA left  Written consent obtained. Time out performed.   Local: cetacaine and 1% 1:100,000 lidocaine with epinephrine.   Findings: cetacaine sprayed, then 1 cc local injected. 18 gauge on 10 cc syringe used to aspirate at left soft palate into PTA. 7 cc purulence obtained. 2nd pass with no more purulence.      Assessment and Plan:       1. Peritonsillar abscess        Drained today.   No diet restrictions. Pain meds if needed.   Augmentin and steroids as directed.     RTC: PRN    Plan of care was discussed in detail with the patient, who agreed with the plan as above. All questions were  answered in detail.     Tasha Warner MD  Otolaryngology

## 2023-09-21 NOTE — TELEPHONE ENCOUNTER
----- Message from Kaylynn Vernon PA-C sent at 9/21/2023 11:14 AM CDT -----  ENT is requesting pain medication prior to peritonsillar abscess drainage

## 2023-09-25 LAB — BACTERIA SPEC AEROBE CULT: NO GROWTH

## 2023-11-01 DIAGNOSIS — E66.01 MORBID OBESITY WITH BMI OF 50.0-59.9, ADULT: ICD-10-CM

## 2023-11-01 DIAGNOSIS — R60.0 BILATERAL LOWER EXTREMITY EDEMA: ICD-10-CM

## 2023-11-01 RX ORDER — SERTRALINE HYDROCHLORIDE 50 MG/1
TABLET, FILM COATED ORAL
Qty: 90 TABLET | Refills: 1 | Status: SHIPPED | OUTPATIENT
Start: 2023-11-01

## 2023-11-01 RX ORDER — FUROSEMIDE 20 MG/1
20 TABLET ORAL
Qty: 90 TABLET | Refills: 1 | Status: SHIPPED | OUTPATIENT
Start: 2023-11-01

## 2023-11-01 NOTE — TELEPHONE ENCOUNTER
Please advise  LOV:9/11/23  Nxt Apt: NA  Last Fill:      Sertraline:2/4/23---90---1    Lasix: 11/3/23----90----1

## 2024-01-22 ENCOUNTER — TELEPHONE (OUTPATIENT)
Dept: OBSTETRICS AND GYNECOLOGY | Facility: CLINIC | Age: 64
End: 2024-01-22
Payer: MEDICARE

## 2024-01-22 DIAGNOSIS — Z12.31 ENCOUNTER FOR SCREENING MAMMOGRAM FOR MALIGNANT NEOPLASM OF BREAST: Primary | ICD-10-CM

## 2024-01-22 DIAGNOSIS — I10 ESSENTIAL HYPERTENSION: ICD-10-CM

## 2024-01-22 NOTE — TELEPHONE ENCOUNTER
No care due was identified.  [facility] Embedded Care Due Messages. Reference number: [id number].   1/22/2024 11:19:05 AM CST

## 2024-01-22 NOTE — TELEPHONE ENCOUNTER
----- Message from Reva Dominique sent at 1/22/2024  9:11 AM CST -----  Contact: Patient  Type:  Needs Medical Advice    Who Called:   Patient    Would the patient rather a call back or a response via [facility]?   Call back  Best Call Back Number:   [phone]    Additional Information:   States she got a message on her portal to schedule a mammogram and needs orders in the system - please call - thank you

## 2024-01-22 NOTE — TELEPHONE ENCOUNTER
----- Message from Reva Dominique sent at 1/22/2024  9:08 AM CST -----  Contact: Patient  Type:  RX Refill Request    Who Called:    Patient  Refill or New Rx:  New    RX Name and Strength:  triamterene-hydrochlorothiazide 37.5-25 mg (DYAZIDE) 37.5-25 mg per capsule     Preferred Pharmacy with phone number:      [facility]/pharmacy #[id number] - [place], [place] - [address] [facility]  [address] [address] [address]  Phone: [phone] Fax: [phone]    Local or Mail Order:  Local  Ordering Provider:   Dr Mondragon    Would the patient rather a call back or a response via [facility]?   Call back  Best Call Back Number:  [phone]    Additional Information:   States she needs a new prescription sent to the pharmacy for this medication - please call - thank you

## 2024-01-22 NOTE — TELEPHONE ENCOUNTER
Spoke w/ pt. This was lat filled 3/25/22 #90 x 3R.Pt states she had an over abundance of this and is just now getting low. If OK, please approve.    Last OV 9/11/23

## 2024-01-24 RX ORDER — TRIAMTERENE AND HYDROCHLOROTHIAZIDE 37.5; 25 MG/1; MG/1
1 CAPSULE ORAL EVERY MORNING
Qty: 90 CAPSULE | Refills: 3 | Status: SHIPPED | OUTPATIENT
Start: 2024-01-24

## 2024-02-03 ENCOUNTER — HOSPITAL ENCOUNTER (OUTPATIENT)
Dept: RADIOLOGY | Facility: HOSPITAL | Age: 64
Discharge: HOME | End: 2024-02-03
Attending: OBSTETRICS & GYNECOLOGY
Payer: MEDICARE

## 2024-02-03 DIAGNOSIS — Z12.31 ENCOUNTER FOR SCREENING MAMMOGRAM FOR MALIGNANT NEOPLASM OF BREAST: ICD-10-CM

## 2024-02-03 PROCEDURE — 77063 BREAST TOMOSYNTHESIS BI: CPT | Mod: 26,,, | Performed by: RADIOLOGY

## 2024-02-03 PROCEDURE — 77067 SCR MAMMO BI INCL CAD: CPT | Mod: 26,,, | Performed by: RADIOLOGY

## 2024-02-03 PROCEDURE — 77067 SCR MAMMO BI INCL CAD: CPT | Mod: TC,PO

## 2024-02-14 ENCOUNTER — OFFICE VISIT (OUTPATIENT)
Dept: FAMILY MEDICINE | Facility: CLINIC | Age: 64
End: 2024-02-14
Payer: MEDICARE

## 2024-02-14 VITALS
DIASTOLIC BLOOD PRESSURE: 88 MMHG | HEIGHT: 63 IN | HEART RATE: 85 BPM | BODY MASS INDEX: 51.91 KG/M2 | RESPIRATION RATE: 18 BRPM | OXYGEN SATURATION: 97 % | SYSTOLIC BLOOD PRESSURE: 134 MMHG | WEIGHT: 293 LBS

## 2024-02-14 DIAGNOSIS — R21 RASH AND NONSPECIFIC SKIN ERUPTION: Primary | ICD-10-CM

## 2024-02-14 PROCEDURE — 1160F RVW MEDS BY RX/DR IN RCRD: CPT | Mod: CPTII,S$GLB,, | Performed by: INTERNAL MEDICINE

## 2024-02-14 PROCEDURE — 3075F SYST BP GE 130 - 139MM HG: CPT | Mod: CPTII,S$GLB,, | Performed by: INTERNAL MEDICINE

## 2024-02-14 PROCEDURE — 1101F PT FALLS ASSESS-DOCD LE1/YR: CPT | Mod: CPTII,S$GLB,, | Performed by: INTERNAL MEDICINE

## 2024-02-14 PROCEDURE — 3079F DIAST BP 80-89 MM HG: CPT | Mod: CPTII,S$GLB,, | Performed by: INTERNAL MEDICINE

## 2024-02-14 PROCEDURE — 99214 OFFICE O/P EST MOD 30 MIN: CPT | Mod: S$GLB,,, | Performed by: INTERNAL MEDICINE

## 2024-02-14 PROCEDURE — 99999 PR PBB SHADOW E&M-EST. PATIENT-LVL V: CPT | Mod: PBBFAC,,, | Performed by: INTERNAL MEDICINE

## 2024-02-14 PROCEDURE — 3008F BODY MASS INDEX DOCD: CPT | Mod: CPTII,S$GLB,, | Performed by: INTERNAL MEDICINE

## 2024-02-14 PROCEDURE — 1159F MED LIST DOCD IN RCRD: CPT | Mod: CPTII,S$GLB,, | Performed by: INTERNAL MEDICINE

## 2024-02-14 PROCEDURE — 3288F FALL RISK ASSESSMENT DOCD: CPT | Mod: CPTII,S$GLB,, | Performed by: INTERNAL MEDICINE

## 2024-02-14 RX ORDER — TRIAMCINOLONE ACETONIDE 1 MG/G
OINTMENT TOPICAL 2 TIMES DAILY
Qty: 80 G | Refills: 0 | Status: SHIPPED | OUTPATIENT
Start: 2024-02-14 | End: 2024-03-15

## 2024-02-14 NOTE — PROGRESS NOTES
Assessment and Plan:    1. Rash and nonspecific skin eruption  The rash on her bilateral lower extremities appears consistent with venous stasis dermatitis.  She has been prescribed both Dyazide and Lasix for edema and is not consistently taking either one.  We discussed starting to take both of these as prescribed and to follow-up in 1-2 weeks with PCP for the edema.  Can use triamcinolone to itchy areas of rash on her legs.  With regards to the erythema on her chest, I am not sure this is actually related.  It appears most consistent with something like a sunburn, but could be a photosensitive dermatitis.  Would consider this possibility if this is not going away over the next couple of days I as I expect it would if it is just a sunburn.  - triamcinolone acetonide 0.1% (KENALOG) 0.1 % ointment; Apply topically 2 (two) times daily.  Dispense: 80 g; Refill: 0    ______________________________________________________________________  Subjective:    Chief Complaint:  Rash    HPI:  Concepcion is a 64 y.o. year old female here for evaluation of a rash.     She is new to me she is a patient of Yecenia Daly MD.    She reports that this rash started on Jan 26th. At that time was only on her left lower leg. She went to an urgent care on Jan 27th and reports that she was given a steroid shot and a prescription for triamcinolone cream. Since then the rash temporarily improved for a day or two but then came back. Now on both lower legs, but she also feels that there is some redness of her chest (though this has not been itchy).     She has been prescribed both dyazide and furosemide for HTN and edema, but she reports that she actually only remembers to take this once or twice a week.  She does have constant swelling in both lower extremities.    Medications:  Current Outpatient Medications on File Prior to Visit   Medication Sig Dispense Refill    aspirin (ECOTRIN) 81 MG EC tablet Take 81 mg by mouth once daily.       busPIRone (BUSPAR) 5 MG Tab TAKE 1 TABLET EVERY MORNING , CAN TAKE AN EXTRA DOSE DURING THE DAY AS NEEDED FOR ANXIETY AT LEAST 4 HOURS LATER . 50 tablet 0    calcium carbonate 650 mg calcium (1,625 mg) tablet Take 1 tablet by mouth once daily.      clotrimazole-betamethasone 1-0.05% (LOTRISONE) cream APPLY TO AFFECTED AREA TWICE A DAY FOR UP TO 2 WEEKS AS NEEDED FOR RASH 45 g 0    cranberry 500 mg Cap Take by mouth Daily.      fluticasone propionate (FLONASE) 50 mcg/actuation nasal spray 1-2 sprays a day for congestion 16 g 2    furosemide (LASIX) 20 MG tablet TAKE 1 TABLET (20 MG TOTAL) BY MOUTH ONCE DAILY. 90 tablet 1    glucosamine HCl/chondroitin ambrose (GLUCOSAMINE-CHONDROITIN ORAL) Take by mouth 2 (two) times daily as needed.       Lactobacillus rhamnosus GG (CULTURELLE) 10 billion cell capsule Take 1 capsule by mouth once daily.      loratadine (CLARITIN) 10 mg tablet TAKE 1 TABLET (10 MG TOTAL) BY MOUTH DAILY AS NEEDED FOR ALLERGIES (CONGESTION). 90 tablet 1    multivitamin with minerals tablet Take 1 tablet by mouth once daily.      omega-3 fatty acids/fish oil (FISH OIL OMEGA 3-6-9 ORAL) Take by mouth 2 (two) times a day.      pantoprazole (PROTONIX) 40 MG tablet Take 1 tablet (40 mg total) by mouth daily as needed (acid reflux). 40 mg po daily as needed for reflux. 90 tablet 3    pravastatin (PRAVACHOL) 40 MG tablet Take 1 tablet (40 mg total) by mouth every evening. Generic please 90 tablet 2    triamterene-hydrochlorothiazide 37.5-25 mg (DYAZIDE) 37.5-25 mg per capsule Take 1 capsule by mouth every morning. Take every day at noon 90 capsule 3    ubidecarenone (COQ-10 ORAL) Take by mouth once daily.      vitamin D (VITAMIN D3) 1000 units Tab Take 1,000 Units by mouth once daily.      albuterol (VENTOLIN HFA) 90 mcg/actuation inhaler INHALE 2 PUFFS INTO THE LUNGS EVERY 6 (SIX) HOURS AS NEEDED FOR WHEEZING. RESCUE (Patient not taking: Reported on 2/14/2024) 54 g 1    ascorbic acid, vitamin C, (VITAMIN C) 1000  "MG tablet Take 1,000 mg by mouth once daily.      estradioL (ESTRACE) 0.01 % (0.1 mg/gram) vaginal cream Place 1 g vaginally 3 (three) times a week. (Patient not taking: Reported on 2/14/2024) 12 g 6    hydrOXYzine pamoate (VISTARIL) 25 MG Cap TAKE 1 CAPSULE (25 MG TOTAL) BY MOUTH NIGHTLY AS NEEDED (INSOMNIA). (Patient not taking: Reported on 2/14/2024) 90 capsule 1    ketoconazole (NIZORAL) 2 % shampoo APPLY TOPICALLY ONCE DAILY. FOR 5-7 DAYS THEN AS NEEDED (YEAST INFECTION) (Patient not taking: Reported on 9/11/2023) 120 mL 0    oxyCODONE-acetaminophen (PERCOCET)  mg per tablet Take 1 tablet by mouth every 8 (eight) hours as needed for Pain. (Patient not taking: Reported on 2/14/2024) 21 tablet 0    sertraline (ZOLOFT) 50 MG tablet TAKE 1/2 TABLET BY MOUTH DAILY FOR 1 WEEK THEN INCREASE TO 1 TABLET DAILY AFTER (Patient not taking: Reported on 2/14/2024) 90 tablet 1     No current facility-administered medications on file prior to visit.       Review of Systems:  Review of Systems   Constitutional:  Negative for chills and fever.   Cardiovascular:  Positive for leg swelling.   Skin:  Positive for rash. Negative for wound.       Past Medical History:  Past Medical History:   Diagnosis Date    Arthritis     Bronchitis in child     Essential hypertension 04/01/2021    Gastroesophageal reflux disease 08/09/2020    Generalized anxiety disorder with panic attacks 06/19/2022    Macrocytosis without anemia 01/25/2021    JUANCHO on CPAP 01/25/2021    Other hyperlipidemia 11/10/2020    Squamous papilloma 07/20/2022       Objective:    Vitals:  Vitals:    02/14/24 0942   BP: (!) 144/80   Pulse: 85   Resp: 18   SpO2: 97%   Weight: (!) 137.9 kg (303 lb 14.5 oz)   Height: 5' 3" (1.6 m)   PainSc: 0-No pain       Physical Exam  Vitals reviewed.   Constitutional:       General: She is not in acute distress.     Appearance: She is well-developed.   Eyes:      General:         Right eye: No discharge.         Left eye: No " discharge.      Conjunctiva/sclera: Conjunctivae normal.   Cardiovascular:      Rate and Rhythm: Normal rate and regular rhythm.   Pulmonary:      Effort: Pulmonary effort is normal. No respiratory distress.   Musculoskeletal:      Right lower leg: Edema (1+) present.      Left lower leg: Edema (1+) present.   Skin:     General: Skin is warm and dry.      Comments: Patchy areas of erythematous papules present to areas of edema in bilateral lower extremities    Examination of her chest reveals only erythema without any papules, and only in sun-exposed area   Neurological:      Mental Status: She is alert and oriented to person, place, and time.   Psychiatric:         Behavior: Behavior normal.         Thought Content: Thought content normal.         Judgment: Judgment normal.         Data:  BNP in September unremarkable    Idalia Jin MD  Internal Medicine

## 2024-03-06 ENCOUNTER — OFFICE VISIT (OUTPATIENT)
Dept: FAMILY MEDICINE | Facility: CLINIC | Age: 64
End: 2024-03-06
Payer: MEDICARE

## 2024-03-06 VITALS
BODY MASS INDEX: 53.33 KG/M2 | WEIGHT: 293 LBS | OXYGEN SATURATION: 97 % | SYSTOLIC BLOOD PRESSURE: 128 MMHG | HEART RATE: 67 BPM | DIASTOLIC BLOOD PRESSURE: 88 MMHG | RESPIRATION RATE: 18 BRPM

## 2024-03-06 DIAGNOSIS — E78.49 OTHER HYPERLIPIDEMIA: Chronic | ICD-10-CM

## 2024-03-06 DIAGNOSIS — I10 ESSENTIAL HYPERTENSION: Chronic | ICD-10-CM

## 2024-03-06 DIAGNOSIS — R79.9 ABNORMAL FINDING OF BLOOD CHEMISTRY, UNSPECIFIED: ICD-10-CM

## 2024-03-06 DIAGNOSIS — J30.1 SEASONAL ALLERGIC RHINITIS DUE TO POLLEN: ICD-10-CM

## 2024-03-06 DIAGNOSIS — Z00.00 PREVENTATIVE HEALTH CARE: Primary | ICD-10-CM

## 2024-03-06 DIAGNOSIS — E66.01 CLASS 3 SEVERE OBESITY WITH BODY MASS INDEX (BMI) OF 50.0 TO 59.9 IN ADULT, UNSPECIFIED OBESITY TYPE, UNSPECIFIED WHETHER SERIOUS COMORBIDITY PRESENT: ICD-10-CM

## 2024-03-06 PROBLEM — J34.89 NASAL LESION: Status: RESOLVED | Noted: 2022-07-20 | Resolved: 2024-03-06

## 2024-03-06 PROCEDURE — 3008F BODY MASS INDEX DOCD: CPT | Mod: CPTII,S$GLB,, | Performed by: STUDENT IN AN ORGANIZED HEALTH CARE EDUCATION/TRAINING PROGRAM

## 2024-03-06 PROCEDURE — 99999 PR PBB SHADOW E&M-EST. PATIENT-LVL V: CPT | Mod: PBBFAC,,, | Performed by: STUDENT IN AN ORGANIZED HEALTH CARE EDUCATION/TRAINING PROGRAM

## 2024-03-06 PROCEDURE — 3074F SYST BP LT 130 MM HG: CPT | Mod: CPTII,S$GLB,, | Performed by: STUDENT IN AN ORGANIZED HEALTH CARE EDUCATION/TRAINING PROGRAM

## 2024-03-06 PROCEDURE — 3079F DIAST BP 80-89 MM HG: CPT | Mod: CPTII,S$GLB,, | Performed by: STUDENT IN AN ORGANIZED HEALTH CARE EDUCATION/TRAINING PROGRAM

## 2024-03-06 PROCEDURE — 99397 PER PM REEVAL EST PAT 65+ YR: CPT | Mod: S$GLB,,, | Performed by: STUDENT IN AN ORGANIZED HEALTH CARE EDUCATION/TRAINING PROGRAM

## 2024-03-06 PROCEDURE — 1159F MED LIST DOCD IN RCRD: CPT | Mod: CPTII,S$GLB,, | Performed by: STUDENT IN AN ORGANIZED HEALTH CARE EDUCATION/TRAINING PROGRAM

## 2024-03-06 PROCEDURE — 3288F FALL RISK ASSESSMENT DOCD: CPT | Mod: CPTII,S$GLB,, | Performed by: STUDENT IN AN ORGANIZED HEALTH CARE EDUCATION/TRAINING PROGRAM

## 2024-03-06 PROCEDURE — 1101F PT FALLS ASSESS-DOCD LE1/YR: CPT | Mod: CPTII,S$GLB,, | Performed by: STUDENT IN AN ORGANIZED HEALTH CARE EDUCATION/TRAINING PROGRAM

## 2024-03-06 RX ORDER — LORATADINE 10 MG/1
10 TABLET ORAL DAILY PRN
Qty: 90 TABLET | Refills: 1 | Status: SHIPPED | OUTPATIENT
Start: 2024-03-06 | End: 2025-03-06

## 2024-03-06 RX ORDER — CLOTRIMAZOLE AND BETAMETHASONE DIPROPIONATE 10; .64 MG/G; MG/G
CREAM TOPICAL
Qty: 45 G | Refills: 3 | Status: SHIPPED | OUTPATIENT
Start: 2024-03-06

## 2024-03-06 NOTE — PROGRESS NOTES
Plan:      Concepcion was seen today for follow-up.    Diagnoses and all orders for this visit:    Preventative health care  -     Hemoglobin A1C; Future  -     Lipid Panel; Future  -     Comprehensive Metabolic Panel; Future    Essential hypertension  -     Hemoglobin A1C; Future  -     Comprehensive Metabolic Panel; Future    Other hyperlipidemia  -     Lipid Panel; Future    Abnormal finding of blood chemistry, unspecified  -     Hemoglobin A1C; Future    Class 3 severe obesity with body mass index (BMI) of 50.0 to 59.9 in adult, unspecified obesity type, unspecified whether serious comorbidity present  -     clotrimazole-betamethasone 1-0.05% (LOTRISONE) cream; APPLY TO AFFECTED AREA TWICE A DAY FOR UP TO 2 WEEKS AS NEEDED FOR RASH    Seasonal allergic rhinitis due to pollen  -     loratadine (CLARITIN) 10 mg tablet; Take 1 tablet (10 mg total) by mouth daily as needed for Allergies (congestion).      Follow up in about 6 months (around 9/6/2024), or if symptoms worsen or fail to improve.    Yecenia Daly MD  03/06/2024    Subjective:      Patient ID: Concepcion Oneill is a 64 y.o. female    Chief Complaint   Patient presents with    Follow-up     HPI  64 y.o. female with a PMHx as documented below presents to clinic today for the following:    Annual exam. Needing refills on Claritin and Lotrisone cream. No additional refills needed at this time.     Recent visit w/ Dr. Jin for venous stasis dermatitis - pt states symptoms resolved w/ use of prescribed steroid cream in addition to making sure she takes her Lasix daily as prescribed.    TEREZA w/ panic attacks:   - Zoloft 50 mg daily, Buspar 5 mg daily      Insomnia:   - Previous Rx: Trazodone 50 mg qhs (little effect), Vistaril 25 mg qhs PRN  - Pt reports difficulty falling asleep, no trouble staying asleep     HTN:  - Triamterene-HCTZ 37.5-25 mg daily      HLD:   - Pravastatin 40 mg daily, CoQ10     Bilateral lower extremity swelling:   - Lasix 20 mg  daily  - TTE (8/8/23) w/ EF 60-65% and normal diastolic function     GERD:   - Protonix 40 mg daily PRN     Pt reports already having shingles vaccine (administered in [place], [place]).    ROS  Constitutional:  Negative for chills and fever.   Respiratory:  Negative for shortness of breath.    Cardiovascular:  Negative for chest pain.   Gastrointestinal:  Negative for abdominal pain, constipation, diarrhea, nausea and vomiting.     Current Outpatient Medications   Medication Instructions    albuterol (VENTOLIN HFA) 90 mcg/actuation inhaler INHALE 2 PUFFS INTO THE LUNGS EVERY 6 (SIX) HOURS AS NEEDED FOR WHEEZING. RESCUE    ascorbic acid (vitamin C) (VITAMIN C) 1,000 mg, Oral, Daily    aspirin (ECOTRIN) 81 mg, Oral, Daily    busPIRone (BUSPAR) 5 MG Tab TAKE 1 TABLET EVERY MORNING , CAN TAKE AN EXTRA DOSE DURING THE DAY AS NEEDED FOR ANXIETY AT LEAST 4 HOURS LATER .    calcium carbonate 650 mg calcium (1,625 mg) tablet 1 tablet, Oral, Daily    clotrimazole-betamethasone 1-0.05% (LOTRISONE) cream APPLY TO AFFECTED AREA TWICE A DAY FOR UP TO 2 WEEKS AS NEEDED FOR RASH    cranberry 500 mg Cap Oral, Daily    estradioL (ESTRACE) 1 g, Vaginal, Three times weekly    fluticasone propionate (FLONASE) 50 mcg/actuation nasal spray 1-2 sprays a day for congestion    furosemide (LASIX) 20 mg, Oral    glucosamine HCl/chondroitin ambrose (GLUCOSAMINE-CHONDROITIN ORAL) Oral, 2 times daily PRN    ketoconazole (NIZORAL) 2 % shampoo APPLY TOPICALLY ONCE DAILY. FOR 5-7 DAYS THEN AS NEEDED (YEAST INFECTION)    Lactobacillus rhamnosus GG (CULTURELLE) 10 billion cell capsule 1 capsule, Oral, Daily    loratadine (CLARITIN) 10 mg, Oral, Daily PRN    multivitamin with minerals tablet 1 tablet, Oral, Daily    omega-3 fatty acids/fish oil (FISH OIL OMEGA 3-6-9 ORAL) Oral, 2 times daily    pantoprazole (PROTONIX) 40 mg, Oral, Daily PRN, 40 mg po daily as needed for reflux.    pravastatin (PRAVACHOL) 40 mg, Oral, Nightly, Generic please    sertraline  (ZOLOFT) 50 MG tablet TAKE 1/2 TABLET BY MOUTH DAILY FOR 1 WEEK THEN INCREASE TO 1 TABLET DAILY AFTER    triamcinolone acetonide 0.1% (KENALOG) 0.1 % ointment Topical (Top), 2 times daily    triamterene-hydrochlorothiazide 37.5-25 mg (DYAZIDE) 37.5-25 mg per capsule 1 capsule, Oral, Every morning, Take every day at noon    ubidecarenone (COQ-10 ORAL) Oral, Daily    vitamin D (VITAMIN D3) 1,000 Units, Oral, Daily      Past Medical History:   Diagnosis Date    Arthritis     Bronchitis in child     Essential hypertension 04/01/2021    Gastroesophageal reflux disease 08/09/2020    Generalized anxiety disorder with panic attacks 06/19/2022    Macrocytosis without anemia 01/25/2021    JUANCHO on CPAP 01/25/2021    Other hyperlipidemia 11/10/2020    Seasonal allergic rhinitis due to pollen 03/06/2024    Squamous papilloma 07/20/2022      Objective:      Vitals:    03/06/24 0858 03/06/24 0910   BP: (!) 144/92 128/88   BP Location: Left arm    Patient Position: Sitting    Pulse: 67    Resp: 18    SpO2: 97%    Weight: (!) 136.5 kg (301 lb 0.6 oz)      Body mass index is 53.33 kg/m².    Physical Exam   Constitutional:       General: No acute distress.  HENT:      Head: Normocephalic and atraumatic.   Pulmonary:      Effort: Pulmonary effort is normal. No respiratory distress.   Neurological:      General: No focal deficit present.      Mental Status: Alert and oriented to person, place, and time. Mental status is at baseline.    Assessment:       1. Preventative health care    2. Essential hypertension    3. Other hyperlipidemia    4. Abnormal finding of blood chemistry, unspecified    5. Class 3 severe obesity with body mass index (BMI) of 50.0 to 59.9 in adult, unspecified obesity type, unspecified whether serious comorbidity present    6. Seasonal allergic rhinitis due to pollen        Yecenia Daly MD  [facility]  Office: [phone]   Fax: [phone]  03/06/2024      Disclaimer: This  note was partly generated using dictation software which may occasionally result in transcription errors.    Total time spent on this encounter includes face to face time and non-face to face time preparing to see the patient (eg, review of tests), obtaining and/or reviewing separately obtained history, documenting clinical information in the electronic or other health record, independently interpreting results, and communicating results to the patient/family/caregiver, or care coordinator.

## 2024-03-08 ENCOUNTER — LAB VISIT (OUTPATIENT)
Dept: LAB | Facility: HOSPITAL | Age: 64
End: 2024-03-08
Attending: STUDENT IN AN ORGANIZED HEALTH CARE EDUCATION/TRAINING PROGRAM
Payer: MEDICARE

## 2024-03-08 DIAGNOSIS — Z00.00 PREVENTATIVE HEALTH CARE: ICD-10-CM

## 2024-03-08 DIAGNOSIS — E78.49 OTHER HYPERLIPIDEMIA: Chronic | ICD-10-CM

## 2024-03-08 DIAGNOSIS — I10 ESSENTIAL HYPERTENSION: Chronic | ICD-10-CM

## 2024-03-08 DIAGNOSIS — R79.9 ABNORMAL FINDING OF BLOOD CHEMISTRY, UNSPECIFIED: ICD-10-CM

## 2024-03-08 LAB
ALBUMIN SERPL BCP-MCNC: 3.8 G/DL (ref 3.5–5.2)
ALP SERPL-CCNC: 56 U/L (ref 55–135)
ALT SERPL W/O P-5'-P-CCNC: 28 U/L (ref 10–44)
ANION GAP SERPL CALC-SCNC: 9 MMOL/L (ref 8–16)
AST SERPL-CCNC: 24 U/L (ref 10–40)
BILIRUB SERPL-MCNC: 0.5 MG/DL (ref 0.1–1)
BUN SERPL-MCNC: 23 MG/DL (ref 8–23)
CALCIUM SERPL-MCNC: 9.6 MG/DL (ref 8.7–10.5)
CHLORIDE SERPL-SCNC: 107 MMOL/L (ref 95–110)
CHOLEST SERPL-MCNC: 165 MG/DL (ref 120–199)
CHOLEST/HDLC SERPL: 3.6 {RATIO} (ref 2–5)
CO2 SERPL-SCNC: 28 MMOL/L (ref 23–29)
CREAT SERPL-MCNC: 0.9 MG/DL (ref 0.5–1.4)
EST. GFR  (NO RACE VARIABLE): >60 ML/MIN/1.73 M^2
ESTIMATED AVG GLUCOSE: 114 MG/DL (ref 68–131)
GLUCOSE SERPL-MCNC: 98 MG/DL (ref 70–110)
HBA1C MFR BLD: 5.6 % (ref 4–5.6)
HDLC SERPL-MCNC: 46 MG/DL (ref 40–75)
HDLC SERPL: 27.9 % (ref 20–50)
LDLC SERPL CALC-MCNC: 96.8 MG/DL (ref 63–159)
NONHDLC SERPL-MCNC: 119 MG/DL
POTASSIUM SERPL-SCNC: 4.3 MMOL/L (ref 3.5–5.1)
PROT SERPL-MCNC: 6.9 G/DL (ref 6–8.4)
SODIUM SERPL-SCNC: 144 MMOL/L (ref 136–145)
TRIGL SERPL-MCNC: 111 MG/DL (ref 30–150)

## 2024-03-08 PROCEDURE — 83036 HEMOGLOBIN GLYCOSYLATED A1C: CPT | Performed by: STUDENT IN AN ORGANIZED HEALTH CARE EDUCATION/TRAINING PROGRAM

## 2024-03-08 PROCEDURE — 80053 COMPREHEN METABOLIC PANEL: CPT | Performed by: STUDENT IN AN ORGANIZED HEALTH CARE EDUCATION/TRAINING PROGRAM

## 2024-03-08 PROCEDURE — 36415 COLL VENOUS BLD VENIPUNCTURE: CPT | Mod: PO | Performed by: STUDENT IN AN ORGANIZED HEALTH CARE EDUCATION/TRAINING PROGRAM

## 2024-03-08 PROCEDURE — 80061 LIPID PANEL: CPT | Performed by: STUDENT IN AN ORGANIZED HEALTH CARE EDUCATION/TRAINING PROGRAM

## 2024-05-15 DIAGNOSIS — Z78.0 MENOPAUSE: ICD-10-CM

## 2024-08-10 DIAGNOSIS — E66.01 MORBID OBESITY WITH BMI OF 50.0-59.9, ADULT: ICD-10-CM

## 2024-08-10 DIAGNOSIS — R60.0 BILATERAL LOWER EXTREMITY EDEMA: ICD-10-CM

## 2024-08-10 NOTE — TELEPHONE ENCOUNTER
No care due was identified.  Health [facility] Embedded Care Due Messages. Reference number: [id number].   8/10/2024 7:02:33 AM CDT

## 2024-08-11 RX ORDER — FUROSEMIDE 20 MG/1
20 TABLET ORAL
Qty: 90 TABLET | Refills: 1 | Status: SHIPPED | OUTPATIENT
Start: 2024-08-11

## 2024-08-11 RX ORDER — SERTRALINE HYDROCHLORIDE 50 MG/1
TABLET, FILM COATED ORAL
Qty: 90 TABLET | Refills: 1 | Status: SHIPPED | OUTPATIENT
Start: 2024-08-11

## 2024-08-11 NOTE — TELEPHONE ENCOUNTER
Refill Routing Note   Medication(s) are not appropriate for processing by [facility] for the following reason(s):        Drug-drug interaction    [facility] action(s):  Defer      Medication Therapy Plan: Duplicate Therapy: furosemide, triamterene-hydrochlorothiazide 37.5-25 mg      Appointments  past 12m or future 3m with PCP    Date Provider   Last Visit   3/6/2024 Yecenia Daly MD   Next Visit   9/10/2024 Yecenia Daly MD   ED visits in past 90 days: 0        Note composed:11:02 PM 08/10/2024

## 2024-09-10 ENCOUNTER — OFFICE VISIT (OUTPATIENT)
Dept: FAMILY MEDICINE | Facility: CLINIC | Age: 64
End: 2024-09-10
Payer: MEDICARE

## 2024-09-10 ENCOUNTER — LAB VISIT (OUTPATIENT)
Dept: LAB | Facility: HOSPITAL | Age: 64
End: 2024-09-10
Attending: STUDENT IN AN ORGANIZED HEALTH CARE EDUCATION/TRAINING PROGRAM
Payer: MEDICARE

## 2024-09-10 VITALS
WEIGHT: 293 LBS | HEIGHT: 63 IN | HEART RATE: 76 BPM | BODY MASS INDEX: 51.91 KG/M2 | DIASTOLIC BLOOD PRESSURE: 94 MMHG | SYSTOLIC BLOOD PRESSURE: 140 MMHG

## 2024-09-10 DIAGNOSIS — I10 ESSENTIAL HYPERTENSION: Chronic | ICD-10-CM

## 2024-09-10 DIAGNOSIS — I10 ESSENTIAL HYPERTENSION: Primary | Chronic | ICD-10-CM

## 2024-09-10 DIAGNOSIS — Z71.89 GRIEF COUNSELING: ICD-10-CM

## 2024-09-10 DIAGNOSIS — Z23 NEEDS FLU SHOT: ICD-10-CM

## 2024-09-10 LAB
ALBUMIN SERPL BCP-MCNC: 3.9 G/DL (ref 3.5–5.2)
ALP SERPL-CCNC: 49 U/L (ref 55–135)
ALT SERPL W/O P-5'-P-CCNC: 27 U/L (ref 10–44)
ANION GAP SERPL CALC-SCNC: 10 MMOL/L (ref 8–16)
AST SERPL-CCNC: 23 U/L (ref 10–40)
BILIRUB SERPL-MCNC: 0.4 MG/DL (ref 0.1–1)
BUN SERPL-MCNC: 16 MG/DL (ref 8–23)
CALCIUM SERPL-MCNC: 10 MG/DL (ref 8.7–10.5)
CHLORIDE SERPL-SCNC: 107 MMOL/L (ref 95–110)
CO2 SERPL-SCNC: 26 MMOL/L (ref 23–29)
CREAT SERPL-MCNC: 0.9 MG/DL (ref 0.5–1.4)
EST. GFR  (NO RACE VARIABLE): >60 ML/MIN/1.73 M^2
GLUCOSE SERPL-MCNC: 93 MG/DL (ref 70–110)
POTASSIUM SERPL-SCNC: 4.4 MMOL/L (ref 3.5–5.1)
PROT SERPL-MCNC: 7 G/DL (ref 6–8.4)
SODIUM SERPL-SCNC: 143 MMOL/L (ref 136–145)

## 2024-09-10 PROCEDURE — 99214 OFFICE O/P EST MOD 30 MIN: CPT | Mod: S$GLB,,, | Performed by: STUDENT IN AN ORGANIZED HEALTH CARE EDUCATION/TRAINING PROGRAM

## 2024-09-10 PROCEDURE — 90653 IIV ADJUVANT VACCINE IM: CPT | Mod: S$GLB,,, | Performed by: STUDENT IN AN ORGANIZED HEALTH CARE EDUCATION/TRAINING PROGRAM

## 2024-09-10 PROCEDURE — 1160F RVW MEDS BY RX/DR IN RCRD: CPT | Mod: CPTII,S$GLB,, | Performed by: STUDENT IN AN ORGANIZED HEALTH CARE EDUCATION/TRAINING PROGRAM

## 2024-09-10 PROCEDURE — 99999 PR PBB SHADOW E&M-EST. PATIENT-LVL IV: CPT | Mod: PBBFAC,,, | Performed by: STUDENT IN AN ORGANIZED HEALTH CARE EDUCATION/TRAINING PROGRAM

## 2024-09-10 PROCEDURE — 3044F HG A1C LEVEL LT 7.0%: CPT | Mod: CPTII,S$GLB,, | Performed by: STUDENT IN AN ORGANIZED HEALTH CARE EDUCATION/TRAINING PROGRAM

## 2024-09-10 PROCEDURE — G0008 ADMIN INFLUENZA VIRUS VAC: HCPCS | Mod: S$GLB,,, | Performed by: STUDENT IN AN ORGANIZED HEALTH CARE EDUCATION/TRAINING PROGRAM

## 2024-09-10 PROCEDURE — 3080F DIAST BP >= 90 MM HG: CPT | Mod: CPTII,S$GLB,, | Performed by: STUDENT IN AN ORGANIZED HEALTH CARE EDUCATION/TRAINING PROGRAM

## 2024-09-10 PROCEDURE — 3008F BODY MASS INDEX DOCD: CPT | Mod: CPTII,S$GLB,, | Performed by: STUDENT IN AN ORGANIZED HEALTH CARE EDUCATION/TRAINING PROGRAM

## 2024-09-10 PROCEDURE — 3288F FALL RISK ASSESSMENT DOCD: CPT | Mod: CPTII,S$GLB,, | Performed by: STUDENT IN AN ORGANIZED HEALTH CARE EDUCATION/TRAINING PROGRAM

## 2024-09-10 PROCEDURE — 1101F PT FALLS ASSESS-DOCD LE1/YR: CPT | Mod: CPTII,S$GLB,, | Performed by: STUDENT IN AN ORGANIZED HEALTH CARE EDUCATION/TRAINING PROGRAM

## 2024-09-10 PROCEDURE — 1159F MED LIST DOCD IN RCRD: CPT | Mod: CPTII,S$GLB,, | Performed by: STUDENT IN AN ORGANIZED HEALTH CARE EDUCATION/TRAINING PROGRAM

## 2024-09-10 PROCEDURE — 36415 COLL VENOUS BLD VENIPUNCTURE: CPT | Mod: PN | Performed by: STUDENT IN AN ORGANIZED HEALTH CARE EDUCATION/TRAINING PROGRAM

## 2024-09-10 PROCEDURE — 3077F SYST BP >= 140 MM HG: CPT | Mod: CPTII,S$GLB,, | Performed by: STUDENT IN AN ORGANIZED HEALTH CARE EDUCATION/TRAINING PROGRAM

## 2024-09-10 PROCEDURE — 80053 COMPREHEN METABOLIC PANEL: CPT | Performed by: STUDENT IN AN ORGANIZED HEALTH CARE EDUCATION/TRAINING PROGRAM

## 2024-09-10 NOTE — PROGRESS NOTES
Plan:      Concepcion was seen today for hypertension.    Diagnoses and all orders for this visit:    Essential hypertension: Pt to check BP at home and send in new reading this afternoon/evening.  -     Comprehensive Metabolic Panel; Future  -     influenza (adjuvanted) (Fluad) 45 mcg/0.5 mL IM vaccine (> or = 66 yo) 0.5 mL    Grief counseling    Needs flu shot  -     influenza (High-Dose) (Fluzone High-Dose) 180 mcg/0.5 mL IM vaccine (> or = 66 yo) 0.5 mL      Follow up in about 6 months (around 3/10/2025), or if symptoms worsen or fail to improve.    Sooner follow-up if BP remains uncontrolled.    Yecenia Daly MD  09/10/2024    Subjective:      Patient ID: Concepcion Oneill is a 64 y.o. female    Chief Complaint   Patient presents with    Hypertension     HPI  64 y.o. female with a PMHx as documented below presents to clinic today for the following:    Routine follow-up. No issues at this time.     Pt 's  sadly passed away about 3 months ago - sudden, new cancer Dx. Pt doing okay. Reports having good family support.     Labs completed 3/8/24 (CMP, A1c, lipid panel) all within normal limits.     TEREZA w/ panic attacks:   - Zoloft 50 mg daily, Buspar 5 mg daily - not currently taking, did not feel like they helped when taking     Insomnia:   - Previous Rx: Trazodone 50 mg qhs (little effect), Vistaril 25 mg qhs PRN  - Pt reports difficulty falling asleep, no trouble staying asleep     HTN:  - Triamterene-HCTZ 37.5-25 mg daily      HLD:   - Pravastatin 40 mg daily, CoQ10     Bilateral lower extremity swelling:   - Lasix 20 mg daily  - TTE (8/8/23) w/ EF 60-65% and normal diastolic function     GERD:   - Protonix 40 mg daily PRN     Pt reports already having shingles vaccine (administered in [place], [place]).    ROS  Constitutional:  Negative for chills and fever.   Respiratory:  Negative for shortness of breath.    Cardiovascular:  Negative for chest pain.   Gastrointestinal:  Negative for abdominal pain,  constipation, diarrhea, nausea and vomiting.     Current Outpatient Medications   Medication Instructions    albuterol (VENTOLIN HFA) 90 mcg/actuation inhaler INHALE 2 PUFFS INTO THE LUNGS EVERY 6 (SIX) HOURS AS NEEDED FOR WHEEZING. RESCUE    aspirin (ECOTRIN) 81 mg, Oral, Daily    calcium carbonate 650 mg calcium (1,625 mg) tablet 1 tablet, Oral, Daily    clotrimazole-betamethasone 1-0.05% (LOTRISONE) cream APPLY TO AFFECTED AREA TWICE A DAY FOR UP TO 2 WEEKS AS NEEDED FOR RASH    cranberry 500 mg Cap Oral, Daily    estradioL (ESTRACE) 1 g, Vaginal, Three times weekly    furosemide (LASIX) 20 mg, Oral    glucosamine HCl/chondroitin ambrose (GLUCOSAMINE-CHONDROITIN ORAL) Oral, 2 times daily PRN    ketoconazole (NIZORAL) 2 % shampoo APPLY TOPICALLY ONCE DAILY. FOR 5-7 DAYS THEN AS NEEDED (YEAST INFECTION)    Lactobacillus rhamnosus GG (CULTURELLE) 10 billion cell capsule 1 capsule, Oral, Daily    loratadine (CLARITIN) 10 mg, Oral, Daily PRN    multivitamin with minerals tablet 1 tablet, Oral, Daily    omega-3 fatty acids/fish oil (FISH OIL OMEGA 3-6-9 ORAL) Oral, 2 times daily    pantoprazole (PROTONIX) 40 mg, Oral, Daily PRN, 40 mg po daily as needed for reflux.    pravastatin (PRAVACHOL) 40 mg, Oral, Nightly, Generic please    sertraline (ZOLOFT) 50 MG tablet TAKE 1/2 TABLET BY MOUTH DAILY FOR 1 WEEK THEN INCREASE TO 1 TABLET DAILY AFTER    triamcinolone acetonide 0.1% (KENALOG) 0.1 % ointment Topical (Top), 2 times daily    triamterene-hydrochlorothiazide 37.5-25 mg (DYAZIDE) 37.5-25 mg per capsule 1 capsule, Oral, Every morning, Take every day at noon    ubidecarenone (COQ-10 ORAL) 100 mg, Oral, Daily    vitamin D (VITAMIN D3) 1,000 Units, Oral, Daily      Past Medical History:   Diagnosis Date    Arthritis     Bronchitis in child     Essential hypertension 04/01/2021    Gastroesophageal reflux disease 08/09/2020    Generalized anxiety disorder with panic attacks 06/19/2022    Macrocytosis without anemia 01/25/2021     "JUANCHO on CPAP 01/25/2021    Other hyperlipidemia 11/10/2020    Seasonal allergic rhinitis due to pollen 03/06/2024    Squamous papilloma 07/20/2022      Objective:      Vitals:    09/10/24 0843 09/10/24 0900   BP: (!) 148/100 (!) 140/94   BP Location: Left arm    Pulse: 76    Weight: 134.9 kg (297 lb 8.2 oz)    Height: 5' 2.5" (1.588 m)      Body mass index is 53.55 kg/m².    Physical Exam   Constitutional:       General: No acute distress.  HENT:      Head: Normocephalic and atraumatic.   Pulmonary:      Effort: Pulmonary effort is normal. No respiratory distress.   Neurological:      General: No focal deficit present.      Mental Status: Alert and oriented to person, place, and time. Mental status is at baseline.    Assessment:       1. Essential hypertension    2. Grief counseling    3. Needs flu shot        Yecenia Daly MD  [facility]  Office: [phone]   Fax: [phone]  09/10/2024      Disclaimer: This note was partly generated using dictation software which may occasionally result in transcription errors.    Total time spent on this encounter includes face to face time and non-face to face time preparing to see the patient (eg, review of tests), obtaining and/or reviewing separately obtained history, documenting clinical information in the electronic or other health record, independently interpreting results, and communicating results to the patient/family/caregiver, or care coordinator.    "

## 2024-09-13 ENCOUNTER — PATIENT MESSAGE (OUTPATIENT)
Dept: FAMILY MEDICINE | Facility: CLINIC | Age: 64
End: 2024-09-13
Payer: MEDICARE

## 2024-09-16 VITALS — DIASTOLIC BLOOD PRESSURE: 82 MMHG | SYSTOLIC BLOOD PRESSURE: 132 MMHG

## 2024-11-25 DIAGNOSIS — K21.9 GASTROESOPHAGEAL REFLUX DISEASE, UNSPECIFIED WHETHER ESOPHAGITIS PRESENT: Chronic | ICD-10-CM

## 2024-11-25 RX ORDER — PANTOPRAZOLE SODIUM 40 MG/1
40 TABLET, DELAYED RELEASE ORAL DAILY PRN
Qty: 90 TABLET | Refills: 1 | Status: SHIPPED | OUTPATIENT
Start: 2024-11-25 | End: 2025-11-20

## 2024-11-25 NOTE — TELEPHONE ENCOUNTER
----- Message from Lina sent at 11/25/2024  9:25 AM CST -----  Type:  RX Refill Request    Who Called: PT    Refill or New Rx:Refill    RX Name and Strength: Disp Refills Start End   pravastatin (PRAVACHOL) 40 MG tablet 90 tablet 2 8/31/2023 -   Sig - Route: Take 1 tablet (40 mg total) by mouth every evening. Generic please - Oral   Sent to pharmacy as: pravastatin (PRAVACHOL) 40 MG tablet   E-Prescribing Status: Receipt co   How is the patient currently taking it? (ex. 1XDay):see above     Is this a 30 day or 90 day RX:see above     Preferred Pharmacy with phone number:    [facility]/pharmacy #[id number] - [place] - [address] [address] [facility]  [address] [address] [address]  Phone: [phone] Fax: [phone]  Local or Mail Order:local    Ordering Provider:    Would the patient rather a call back or a response via [facility]? call    Best Call Back Number: [phone]         Additional Information:  Please call back to advise. Thank you!

## 2024-11-25 NOTE — TELEPHONE ENCOUNTER
No care due was identified.  Health [facility] Embedded Care Due Messages. Reference number: [id number].   11/25/2024 9:54:34 AM CST

## 2024-11-29 ENCOUNTER — TELEPHONE (OUTPATIENT)
Dept: FAMILY MEDICINE | Facility: CLINIC | Age: 64
End: 2024-11-29
Payer: MEDICARE

## 2024-11-29 DIAGNOSIS — E78.5 DYSLIPIDEMIA: ICD-10-CM

## 2024-11-29 DIAGNOSIS — M79.10 MYALGIA: ICD-10-CM

## 2024-11-29 DIAGNOSIS — E78.49 OTHER HYPERLIPIDEMIA: ICD-10-CM

## 2024-11-29 NOTE — TELEPHONE ENCOUNTER
----- Message from Kalyn sent at 11/29/2024  1:33 PM CST -----  Regarding: Returning call  Type: Needs Medical Advice  Who Called:  Pt    Best Call Back Number: [phone]    Additional Information: Pt is calling back she said she neevr got a call back or an update regarding her pravastatin, please call to jerrod        [facility]/pharmacy #[id number]  [place] - [address] [address] [facility] [address] [address] [facility]  [place] [address]  Phone: [phone] Fax: [phone]

## 2024-11-29 NOTE — TELEPHONE ENCOUNTER
No care due was identified.  Health [facility] Embedded Care Due Messages. Reference number: [id number].   11/29/2024 2:40:50 PM CST

## 2024-11-29 NOTE — TELEPHONE ENCOUNTER
----- Message from Kalyn sent at 11/29/2024  1:33 PM CST -----  Regarding: Returning call  Type: Needs Medical Advice  Who Called:  Pt    Best Call Back Number: [phone]    Additional Information: Pt is calling back she said she neevr got a call back or an update regarding her pravastatin, please call to jerrod        [facility]/pharmacy #[id number]  [place] - [address] [address] [address] [facility]  [place] [address]  Phone: [phone] Fax: [phone]

## 2024-11-30 NOTE — TELEPHONE ENCOUNTER
Refill Routing Note   Medication(s) are not appropriate for processing by [facility] for the following reason(s):        No active prescription written by provider    [facility] action(s):  Defer      Medication Therapy Plan: The provider responsible for managing the medication isnt the PCP      Appointments  past 12m or future 3m with PCP    Date Provider   Last Visit   9/10/2024 Yecenia Daly MD   Next Visit   11/29/2024 Yecenia Daly MD   ED visits in past 90 days: 0        Note composed:8:42 PM 11/29/2024

## 2024-12-02 RX ORDER — PRAVASTATIN SODIUM 40 MG/1
40 TABLET ORAL NIGHTLY
Qty: 90 TABLET | Refills: 2 | Status: SHIPPED | OUTPATIENT
Start: 2024-12-02

## 2025-01-06 ENCOUNTER — TELEPHONE (OUTPATIENT)
Dept: OBSTETRICS AND GYNECOLOGY | Facility: CLINIC | Age: 65
End: 2025-01-06
Payer: MEDICARE

## 2025-01-06 DIAGNOSIS — Z12.31 ENCOUNTER FOR SCREENING MAMMOGRAM FOR MALIGNANT NEOPLASM OF BREAST: Primary | ICD-10-CM

## 2025-01-06 NOTE — TELEPHONE ENCOUNTER
----- Message from Rachelle sent at 1/6/2025  2:47 PM CST -----  Contact: pt  Type: Needs Medical Advice         Who Called: pt  Best Call Back Number:[phone]  Additional Information: Requesting a call back regarding  pt asking for mammo orders.   Please Advise- Thank you

## 2025-01-28 DIAGNOSIS — I10 ESSENTIAL HYPERTENSION: ICD-10-CM

## 2025-01-28 RX ORDER — TRIAMTERENE AND HYDROCHLOROTHIAZIDE 37.5; 25 MG/1; MG/1
1 CAPSULE ORAL EVERY MORNING
Qty: 90 CAPSULE | Refills: 2 | Status: SHIPPED | OUTPATIENT
Start: 2025-01-28

## 2025-01-28 NOTE — TELEPHONE ENCOUNTER
Provider Staff:  Action required for this patient    Requires labs      Please see care gap opportunities below in Care Due Message.    Thanks!  [facility]     Appointments      Date Provider   Last Visit   9/10/2024 Yecenia Daly MD   Next Visit   3/11/2025 Yecenia Daly MD     Refill Decision Note   Concepcion Oneill  is requesting a refill authorization.  Brief Assessment and Rationale for Refill:  Approve     Medication Therapy Plan:         Comments:     Note composed:1:28 PM 01/28/2025

## 2025-01-28 NOTE — TELEPHONE ENCOUNTER
Care Due:                  Date            Visit Type   Department     Provider  --------------------------------------------------------------------------------                                EP -                              [facility]  Last Visit: 09-      CARE ([facility])   MEDICINE       Yecenia Daly                              EP -                              PRIMARY      [facility]  Next Visit: 03-      CARE ([facility])   MEDICINE       Yecenia Daly                                                            Last  Test          Frequency    Reason                     Performed    Due Date  --------------------------------------------------------------------------------    Lipid Panel.  12 months..  pravastatin..............  03- 03-    Health [facility] Embedded Care Due Messages. Reference number: [id number].   1/28/2025 8:41:36 AM CST

## 2025-02-04 ENCOUNTER — HOSPITAL ENCOUNTER (OUTPATIENT)
Dept: RADIOLOGY | Facility: HOSPITAL | Age: 65
Discharge: HOME | End: 2025-02-04
Attending: OBSTETRICS & GYNECOLOGY
Payer: MEDICARE

## 2025-02-04 DIAGNOSIS — Z12.31 ENCOUNTER FOR SCREENING MAMMOGRAM FOR MALIGNANT NEOPLASM OF BREAST: ICD-10-CM

## 2025-02-04 PROCEDURE — 77063 BREAST TOMOSYNTHESIS BI: CPT | Mod: TC,PO

## 2025-02-04 PROCEDURE — 77067 SCR MAMMO BI INCL CAD: CPT | Mod: 26,,, | Performed by: RADIOLOGY

## 2025-02-04 PROCEDURE — 77063 BREAST TOMOSYNTHESIS BI: CPT | Mod: 26,,, | Performed by: RADIOLOGY

## 2025-02-05 ENCOUNTER — TELEPHONE (OUTPATIENT)
Dept: RADIOLOGY | Facility: HOSPITAL | Age: 65
End: 2025-02-05
Payer: MEDICARE

## 2025-02-05 NOTE — TELEPHONE ENCOUNTER
I left a message for patient to call back to schedule diagnostic breast imaging.  Patient is scheduled on 2-12-25.

## 2025-02-12 ENCOUNTER — HOSPITAL ENCOUNTER (OUTPATIENT)
Dept: RADIOLOGY | Facility: HOSPITAL | Age: 65
Discharge: HOME | End: 2025-02-12
Attending: OBSTETRICS & GYNECOLOGY
Payer: MEDICARE

## 2025-02-12 DIAGNOSIS — R92.8 ABNORMALITY OF LEFT BREAST ON SCREENING MAMMOGRAM: ICD-10-CM

## 2025-02-12 PROCEDURE — 77065 DX MAMMO INCL CAD UNI: CPT | Mod: 26,LT,, | Performed by: RADIOLOGY

## 2025-02-12 PROCEDURE — 77061 BREAST TOMOSYNTHESIS UNI: CPT | Mod: TC,PO,LT

## 2025-02-12 PROCEDURE — 76642 ULTRASOUND BREAST LIMITED: CPT | Mod: TC,PO,LT

## 2025-02-12 PROCEDURE — 76642 ULTRASOUND BREAST LIMITED: CPT | Mod: 26,LT,, | Performed by: RADIOLOGY

## 2025-02-12 PROCEDURE — 77061 BREAST TOMOSYNTHESIS UNI: CPT | Mod: 26,LT,, | Performed by: RADIOLOGY

## 2025-03-11 ENCOUNTER — LAB VISIT (OUTPATIENT)
Dept: LAB | Facility: HOSPITAL | Age: 65
End: 2025-03-11
Payer: MEDICARE

## 2025-03-11 ENCOUNTER — OFFICE VISIT (OUTPATIENT)
Dept: FAMILY MEDICINE | Facility: CLINIC | Age: 65
End: 2025-03-11
Payer: MEDICARE

## 2025-03-11 VITALS
DIASTOLIC BLOOD PRESSURE: 80 MMHG | BODY MASS INDEX: 53.92 KG/M2 | HEART RATE: 65 BPM | TEMPERATURE: 98 F | HEIGHT: 62 IN | WEIGHT: 293 LBS | OXYGEN SATURATION: 100 % | SYSTOLIC BLOOD PRESSURE: 118 MMHG | RESPIRATION RATE: 16 BRPM

## 2025-03-11 DIAGNOSIS — E78.49 OTHER HYPERLIPIDEMIA: Chronic | ICD-10-CM

## 2025-03-11 DIAGNOSIS — E66.813 CLASS 3 SEVERE OBESITY WITH BODY MASS INDEX (BMI) OF 50.0 TO 59.9 IN ADULT, UNSPECIFIED OBESITY TYPE, UNSPECIFIED WHETHER SERIOUS COMORBIDITY PRESENT: ICD-10-CM

## 2025-03-11 DIAGNOSIS — K21.9 GASTROESOPHAGEAL REFLUX DISEASE, UNSPECIFIED WHETHER ESOPHAGITIS PRESENT: Chronic | ICD-10-CM

## 2025-03-11 DIAGNOSIS — E66.01 CLASS 3 SEVERE OBESITY WITH BODY MASS INDEX (BMI) OF 50.0 TO 59.9 IN ADULT, UNSPECIFIED OBESITY TYPE, UNSPECIFIED WHETHER SERIOUS COMORBIDITY PRESENT: ICD-10-CM

## 2025-03-11 DIAGNOSIS — F41.0 GENERALIZED ANXIETY DISORDER WITH PANIC ATTACKS: Chronic | ICD-10-CM

## 2025-03-11 DIAGNOSIS — I10 ESSENTIAL HYPERTENSION: Chronic | ICD-10-CM

## 2025-03-11 DIAGNOSIS — R60.0 BILATERAL LOWER EXTREMITY EDEMA: ICD-10-CM

## 2025-03-11 DIAGNOSIS — G47.33 OSA ON CPAP: Chronic | ICD-10-CM

## 2025-03-11 DIAGNOSIS — Z00.00 PREVENTATIVE HEALTH CARE: ICD-10-CM

## 2025-03-11 DIAGNOSIS — D75.89 MACROCYTOSIS WITHOUT ANEMIA: Chronic | ICD-10-CM

## 2025-03-11 DIAGNOSIS — F41.1 GENERALIZED ANXIETY DISORDER: ICD-10-CM

## 2025-03-11 DIAGNOSIS — Z78.0 ASYMPTOMATIC POSTMENOPAUSAL STATE: ICD-10-CM

## 2025-03-11 DIAGNOSIS — F41.1 GENERALIZED ANXIETY DISORDER WITH PANIC ATTACKS: Chronic | ICD-10-CM

## 2025-03-11 DIAGNOSIS — Z13.1 ENCOUNTER FOR SCREENING FOR DIABETES MELLITUS: ICD-10-CM

## 2025-03-11 DIAGNOSIS — I10 ESSENTIAL HYPERTENSION: Primary | Chronic | ICD-10-CM

## 2025-03-11 DIAGNOSIS — R79.9 ABNORMAL FINDING OF BLOOD CHEMISTRY, UNSPECIFIED: ICD-10-CM

## 2025-03-11 DIAGNOSIS — J30.1 SEASONAL ALLERGIC RHINITIS DUE TO POLLEN: Chronic | ICD-10-CM

## 2025-03-11 LAB
ALBUMIN SERPL BCP-MCNC: 3.8 G/DL (ref 3.5–5.2)
ALP SERPL-CCNC: 48 U/L (ref 40–150)
ALT SERPL W/O P-5'-P-CCNC: 29 U/L (ref 10–44)
ANION GAP SERPL CALC-SCNC: 9 MMOL/L (ref 8–16)
AST SERPL-CCNC: 27 U/L (ref 10–40)
BASOPHILS # BLD AUTO: 0.03 K/UL (ref 0–0.2)
BASOPHILS NFR BLD: 0.5 % (ref 0–1.9)
BILIRUB SERPL-MCNC: 0.5 MG/DL (ref 0.1–1)
BUN SERPL-MCNC: 17 MG/DL (ref 8–23)
CALCIUM SERPL-MCNC: 9.4 MG/DL (ref 8.7–10.5)
CHLORIDE SERPL-SCNC: 105 MMOL/L (ref 95–110)
CHOLEST SERPL-MCNC: 183 MG/DL (ref 120–199)
CHOLEST/HDLC SERPL: 3.9 {RATIO} (ref 2–5)
CO2 SERPL-SCNC: 27 MMOL/L (ref 23–29)
CREAT SERPL-MCNC: 0.8 MG/DL (ref 0.5–1.4)
DIFFERENTIAL METHOD BLD: ABNORMAL
EOSINOPHIL # BLD AUTO: 0.1 K/UL (ref 0–0.5)
EOSINOPHIL NFR BLD: 1.3 % (ref 0–8)
ERYTHROCYTE [DISTWIDTH] IN BLOOD BY AUTOMATED COUNT: 13.7 % (ref 11.5–14.5)
EST. GFR  (NO RACE VARIABLE): >60 ML/MIN/1.73 M^2
GLUCOSE SERPL-MCNC: 98 MG/DL (ref 70–110)
HCT VFR BLD AUTO: 44.1 % (ref 37–48.5)
HDLC SERPL-MCNC: 47 MG/DL (ref 40–75)
HDLC SERPL: 25.7 % (ref 20–50)
HGB BLD-MCNC: 13.9 G/DL (ref 12–16)
IMM GRANULOCYTES # BLD AUTO: 0.02 K/UL (ref 0–0.04)
IMM GRANULOCYTES NFR BLD AUTO: 0.3 % (ref 0–0.5)
LDLC SERPL CALC-MCNC: 117.4 MG/DL (ref 63–159)
LYMPHOCYTES # BLD AUTO: 2 K/UL (ref 1–4.8)
LYMPHOCYTES NFR BLD: 33.1 % (ref 18–48)
MCH RBC QN AUTO: 32 PG (ref 27–31)
MCHC RBC AUTO-ENTMCNC: 31.5 G/DL (ref 32–36)
MCV RBC AUTO: 102 FL (ref 82–98)
MONOCYTES # BLD AUTO: 0.5 K/UL (ref 0.3–1)
MONOCYTES NFR BLD: 8.8 % (ref 4–15)
NEUTROPHILS # BLD AUTO: 3.4 K/UL (ref 1.8–7.7)
NEUTROPHILS NFR BLD: 56 % (ref 38–73)
NONHDLC SERPL-MCNC: 136 MG/DL
NRBC BLD-RTO: 0 /100 WBC
PLATELET # BLD AUTO: 303 K/UL (ref 150–450)
PMV BLD AUTO: 10.1 FL (ref 9.2–12.9)
POTASSIUM SERPL-SCNC: 4.3 MMOL/L (ref 3.5–5.1)
PROT SERPL-MCNC: 7 G/DL (ref 6–8.4)
RBC # BLD AUTO: 4.34 M/UL (ref 4–5.4)
SODIUM SERPL-SCNC: 141 MMOL/L (ref 136–145)
TRIGL SERPL-MCNC: 93 MG/DL (ref 30–150)
WBC # BLD AUTO: 6.14 K/UL (ref 3.9–12.7)

## 2025-03-11 PROCEDURE — 85025 COMPLETE CBC W/AUTO DIFF WBC: CPT | Performed by: STUDENT IN AN ORGANIZED HEALTH CARE EDUCATION/TRAINING PROGRAM

## 2025-03-11 PROCEDURE — 3288F FALL RISK ASSESSMENT DOCD: CPT | Mod: CPTII,S$GLB,, | Performed by: STUDENT IN AN ORGANIZED HEALTH CARE EDUCATION/TRAINING PROGRAM

## 2025-03-11 PROCEDURE — 99397 PER PM REEVAL EST PAT 65+ YR: CPT | Mod: S$GLB,,, | Performed by: STUDENT IN AN ORGANIZED HEALTH CARE EDUCATION/TRAINING PROGRAM

## 2025-03-11 PROCEDURE — 83036 HEMOGLOBIN GLYCOSYLATED A1C: CPT | Performed by: STUDENT IN AN ORGANIZED HEALTH CARE EDUCATION/TRAINING PROGRAM

## 2025-03-11 PROCEDURE — 1101F PT FALLS ASSESS-DOCD LE1/YR: CPT | Mod: CPTII,S$GLB,, | Performed by: STUDENT IN AN ORGANIZED HEALTH CARE EDUCATION/TRAINING PROGRAM

## 2025-03-11 PROCEDURE — 1126F AMNT PAIN NOTED NONE PRSNT: CPT | Mod: CPTII,S$GLB,, | Performed by: STUDENT IN AN ORGANIZED HEALTH CARE EDUCATION/TRAINING PROGRAM

## 2025-03-11 PROCEDURE — 36415 COLL VENOUS BLD VENIPUNCTURE: CPT | Mod: PN | Performed by: STUDENT IN AN ORGANIZED HEALTH CARE EDUCATION/TRAINING PROGRAM

## 2025-03-11 PROCEDURE — 3008F BODY MASS INDEX DOCD: CPT | Mod: CPTII,S$GLB,, | Performed by: STUDENT IN AN ORGANIZED HEALTH CARE EDUCATION/TRAINING PROGRAM

## 2025-03-11 PROCEDURE — 3079F DIAST BP 80-89 MM HG: CPT | Mod: CPTII,S$GLB,, | Performed by: STUDENT IN AN ORGANIZED HEALTH CARE EDUCATION/TRAINING PROGRAM

## 2025-03-11 PROCEDURE — 3074F SYST BP LT 130 MM HG: CPT | Mod: CPTII,S$GLB,, | Performed by: STUDENT IN AN ORGANIZED HEALTH CARE EDUCATION/TRAINING PROGRAM

## 2025-03-11 PROCEDURE — 84443 ASSAY THYROID STIM HORMONE: CPT | Performed by: STUDENT IN AN ORGANIZED HEALTH CARE EDUCATION/TRAINING PROGRAM

## 2025-03-11 PROCEDURE — 1160F RVW MEDS BY RX/DR IN RCRD: CPT | Mod: CPTII,S$GLB,, | Performed by: STUDENT IN AN ORGANIZED HEALTH CARE EDUCATION/TRAINING PROGRAM

## 2025-03-11 PROCEDURE — 99999 PR PBB SHADOW E&M-EST. PATIENT-LVL IV: CPT | Mod: PBBFAC,,, | Performed by: STUDENT IN AN ORGANIZED HEALTH CARE EDUCATION/TRAINING PROGRAM

## 2025-03-11 PROCEDURE — 80053 COMPREHEN METABOLIC PANEL: CPT | Performed by: STUDENT IN AN ORGANIZED HEALTH CARE EDUCATION/TRAINING PROGRAM

## 2025-03-11 PROCEDURE — 1159F MED LIST DOCD IN RCRD: CPT | Mod: CPTII,S$GLB,, | Performed by: STUDENT IN AN ORGANIZED HEALTH CARE EDUCATION/TRAINING PROGRAM

## 2025-03-11 PROCEDURE — 80061 LIPID PANEL: CPT | Performed by: STUDENT IN AN ORGANIZED HEALTH CARE EDUCATION/TRAINING PROGRAM

## 2025-03-11 RX ORDER — SERTRALINE HYDROCHLORIDE 50 MG/1
50 TABLET, FILM COATED ORAL DAILY
Qty: 90 TABLET | Refills: 3 | Status: SHIPPED | OUTPATIENT
Start: 2025-03-11 | End: 2026-03-06

## 2025-03-11 RX ORDER — FUROSEMIDE 20 MG/1
20 TABLET ORAL DAILY
Qty: 90 TABLET | Refills: 3 | Status: SHIPPED | OUTPATIENT
Start: 2025-03-11 | End: 2026-03-06

## 2025-03-11 RX ORDER — SERTRALINE HYDROCHLORIDE 50 MG/1
50 TABLET, FILM COATED ORAL DAILY
COMMUNITY
End: 2025-03-11 | Stop reason: SDUPTHER

## 2025-03-11 NOTE — PROGRESS NOTES
Plan:      Concepcion was seen today for follow-up.    Diagnoses and all orders for this visit:    Essential hypertension  -     Comprehensive Metabolic Panel; Future    Other hyperlipidemia  -     Lipid Panel; Future  -     Comprehensive Metabolic Panel; Future    Gastroesophageal reflux disease, unspecified whether esophagitis present  -     CBC Auto Differential; Future    Generalized anxiety disorder with panic attacks  -     Comprehensive Metabolic Panel; Future  -     sertraline (ZOLOFT) 50 MG tablet; Take 1 tablet (50 mg total) by mouth once daily.    Generalized anxiety disorder  -     TSH; Future    Seasonal allergic rhinitis due to pollen  -     CBC Auto Differential; Future    Macrocytosis without anemia  -     CBC Auto Differential; Future    JUANCHO on CPAP  -     CBC Auto Differential; Future    Bilateral lower extremity edema  -     furosemide (LASIX) 20 MG tablet; Take 1 tablet (20 mg total) by mouth once daily.    Preventative health care  -     Hemoglobin A1C; Future  -     Lipid Panel; Future  -     Comprehensive Metabolic Panel; Future  -     CBC Auto Differential; Future    Encounter for screening for diabetes mellitus  -     Hemoglobin A1C; Future    Class 3 severe obesity with body mass index (BMI) of 50.0 to 59.9 in adult, unspecified obesity type, unspecified whether serious comorbidity present  -     Hemoglobin A1C; Future  -     Lipid Panel; Future  -     TSH; Future    Asymptomatic postmenopausal state  -     DXA Bone Density Axial Skeleton 1 or more sites; Future    Abnormal finding of blood chemistry, unspecified  -     Hemoglobin A1C; Future      Follow up in about 6 months (around 9/11/2025), or if symptoms worsen or fail to improve.    Yecenia Daly MD  03/11/2025    Subjective:      Patient ID: Concepcion Oneill is a 65 y.o. female    Chief Complaint   Patient presents with    Follow-up     HPI  65 y.o. female with a PMHx as documented below presents to clinic today for the  following:    Annual exam. No specific concerns at this time.     TEREZA w/ panic attacks:   - Zoloft 50 mg daily     Insomnia:   - Previous Rx: Trazodone 50 mg qhs (little effect), Vistaril 25 mg qhs PRN  - Pt reports difficulty falling asleep, no trouble staying asleep     HTN:  - Triamterene-HCTZ 37.5-25 mg daily      HLD:   - Pravastatin 40 mg daily, CoQ10 100 mg daily     Bilateral lower extremity swelling:   - Lasix 20 mg daily  - TTE (8/8/23) w/ EF 60-65% and normal diastolic function     GERD:   - Protonix 40 mg daily PRN     Pt reports already having shingles vaccine (administered in [place], [place]).    ROS  Constitutional:  Negative for chills and fever.   Respiratory:  Negative for shortness of breath.    Cardiovascular:  Negative for chest pain.   Gastrointestinal:  Negative for abdominal pain, constipation, diarrhea, nausea and vomiting.     Current Outpatient Medications   Medication Instructions    albuterol (VENTOLIN HFA) 90 mcg/actuation inhaler INHALE 2 PUFFS INTO THE LUNGS EVERY 6 (SIX) HOURS AS NEEDED FOR WHEEZING. RESCUE    aspirin (ECOTRIN) 81 mg, Daily    calcium carbonate 650 mg calcium (1,625 mg) tablet 1 tablet, Daily    clotrimazole-betamethasone 1-0.05% (LOTRISONE) cream APPLY TO AFFECTED AREA TWICE A DAY FOR UP TO 2 WEEKS AS NEEDED FOR RASH    cranberry 500 mg Cap Daily    furosemide (LASIX) 20 mg, Oral, Daily    glucosamine HCl/chondroitin ambrose (GLUCOSAMINE-CHONDROITIN ORAL) 2 times daily PRN    ketoconazole (NIZORAL) 2 % shampoo APPLY TOPICALLY ONCE DAILY. FOR 5-7 DAYS THEN AS NEEDED (YEAST INFECTION)    Lactobacillus rhamnosus GG (CULTURELLE) 10 billion cell capsule 1 capsule, Daily    loratadine (CLARITIN) 10 mg, Oral, Daily PRN    multivitamin with minerals tablet 1 tablet, Daily    omega-3 fatty acids/fish oil (FISH OIL OMEGA 3-6-9 ORAL) 2 times daily    pantoprazole (PROTONIX) 40 MG tablet Take 1 tablet (40 mg total) by mouth once daily as needed (acid reflux).    pravastatin  "(PRAVACHOL) 40 mg, Oral, Nightly, Generic please    sertraline (ZOLOFT) 50 mg, Oral, Daily    triamcinolone acetonide 0.1% (KENALOG) 0.1 % ointment Topical (Top), 2 times daily    triamterene-hydrochlorothiazide 37.5-25 mg (DYAZIDE) 37.5-25 mg per capsule 1 capsule, Oral, Every morning, Take every day at noon    ubidecarenone (COQ-10 ORAL) 100 mg, Daily    vitamin D (VITAMIN D3) 1,000 Units, Daily      Past Medical History:   Diagnosis Date    Arthritis     Bronchitis in child     Essential hypertension 04/01/2021    Gastroesophageal reflux disease 08/09/2020    Generalized anxiety disorder with panic attacks 06/19/2022    Macrocytosis without anemia 01/25/2021    JUANCHO on CPAP 01/25/2021    Other hyperlipidemia 11/10/2020    Seasonal allergic rhinitis due to pollen 03/06/2024    Squamous papilloma 07/20/2022      Objective:      Vitals:    03/11/25 0827   BP: 118/80   Pulse: 65   Resp: 16   Temp: 97.9 °F (36.6 °C)   TempSrc: Oral   SpO2: 100%   Weight: (!) 136.2 kg (300 lb 4.3 oz)   Height: 5' 2" (1.575 m)     Body mass index is 54.92 kg/m².    Physical Exam   Constitutional:       General: No acute distress.  HENT:      Head: Normocephalic and atraumatic.   Pulmonary:      Effort: Pulmonary effort is normal. No respiratory distress.   Neurological:      General: No focal deficit present.      Mental Status: Alert and oriented to person, place, and time. Mental status is at baseline.    Assessment:       1. Essential hypertension    2. Other hyperlipidemia    3. Gastroesophageal reflux disease, unspecified whether esophagitis present    4. Generalized anxiety disorder with panic attacks    5. Generalized anxiety disorder    6. Seasonal allergic rhinitis due to pollen    7. Macrocytosis without anemia    8. JUANCHO on CPAP    9. Bilateral lower extremity edema    10. Preventative health care    11. Encounter for screening for diabetes mellitus    12. Class 3 severe obesity with body mass index (BMI) of 50.0 to 59.9 in " adult, unspecified obesity type, unspecified whether serious comorbidity present    13. Asymptomatic postmenopausal state    14. Abnormal finding of blood chemistry, unspecified        Yecenia Daly MD  [facility]  Office: [phone]   Fax: [phone]  03/11/2025      Disclaimer: This note was partly generated using dictation software which may occasionally result in transcription errors.    Total time spent on this encounter includes face to face time and non-face to face time preparing to see the patient (eg, review of tests), obtaining and/or reviewing separately obtained history, documenting clinical information in the electronic or other health record, independently interpreting results, and communicating results to the patient/family/caregiver, or care coordinator.      Visit today included increased complexity associated with the care of the episodic problem (see above) addressed and managing the longitudinal care of the patient due to the serious and/or complex managed problem(s) (see above).

## 2025-03-12 ENCOUNTER — RESULTS FOLLOW-UP (OUTPATIENT)
Dept: FAMILY MEDICINE | Facility: CLINIC | Age: 65
End: 2025-03-12

## 2025-03-12 LAB
ESTIMATED AVG GLUCOSE: 105 MG/DL (ref 68–131)
HBA1C MFR BLD: 5.3 % (ref 4–5.6)
TSH SERPL DL<=0.005 MIU/L-ACNC: 2.08 UIU/ML (ref 0.4–4)

## 2025-03-28 ENCOUNTER — HOSPITAL ENCOUNTER (OUTPATIENT)
Dept: RADIOLOGY | Facility: HOSPITAL | Age: 65
Discharge: HOME | End: 2025-03-28
Attending: STUDENT IN AN ORGANIZED HEALTH CARE EDUCATION/TRAINING PROGRAM
Payer: MEDICARE

## 2025-03-28 ENCOUNTER — RESULTS FOLLOW-UP (OUTPATIENT)
Dept: FAMILY MEDICINE | Facility: CLINIC | Age: 65
End: 2025-03-28

## 2025-03-28 DIAGNOSIS — Z78.0 ASYMPTOMATIC POSTMENOPAUSAL STATE: ICD-10-CM

## 2025-03-28 PROCEDURE — 77080 DXA BONE DENSITY AXIAL: CPT | Mod: 26,,, | Performed by: RADIOLOGY

## 2025-03-28 PROCEDURE — 77080 DXA BONE DENSITY AXIAL: CPT | Mod: TC,PO

## 2025-04-21 ENCOUNTER — OFFICE VISIT (OUTPATIENT)
Dept: FAMILY MEDICINE | Facility: CLINIC | Age: 65
End: 2025-04-21
Payer: MEDICARE

## 2025-04-21 VITALS
WEIGHT: 293 LBS | SYSTOLIC BLOOD PRESSURE: 154 MMHG | HEART RATE: 78 BPM | DIASTOLIC BLOOD PRESSURE: 96 MMHG | BODY MASS INDEX: 54.86 KG/M2 | OXYGEN SATURATION: 98 %

## 2025-04-21 DIAGNOSIS — R21 RASH: Primary | ICD-10-CM

## 2025-04-21 DIAGNOSIS — B37.2 YEAST DERMATITIS: ICD-10-CM

## 2025-04-21 DIAGNOSIS — I10 ESSENTIAL HYPERTENSION: Chronic | ICD-10-CM

## 2025-04-21 DIAGNOSIS — S30.1XXA CONTUSION OF ABDOMINAL WALL, INITIAL ENCOUNTER: ICD-10-CM

## 2025-04-21 PROCEDURE — 3008F BODY MASS INDEX DOCD: CPT | Mod: CPTII,S$GLB,, | Performed by: STUDENT IN AN ORGANIZED HEALTH CARE EDUCATION/TRAINING PROGRAM

## 2025-04-21 PROCEDURE — 99214 OFFICE O/P EST MOD 30 MIN: CPT | Mod: S$GLB,,, | Performed by: STUDENT IN AN ORGANIZED HEALTH CARE EDUCATION/TRAINING PROGRAM

## 2025-04-21 PROCEDURE — 1101F PT FALLS ASSESS-DOCD LE1/YR: CPT | Mod: CPTII,S$GLB,, | Performed by: STUDENT IN AN ORGANIZED HEALTH CARE EDUCATION/TRAINING PROGRAM

## 2025-04-21 PROCEDURE — 1125F AMNT PAIN NOTED PAIN PRSNT: CPT | Mod: CPTII,S$GLB,, | Performed by: STUDENT IN AN ORGANIZED HEALTH CARE EDUCATION/TRAINING PROGRAM

## 2025-04-21 PROCEDURE — G2211 COMPLEX E/M VISIT ADD ON: HCPCS | Mod: S$GLB,,, | Performed by: STUDENT IN AN ORGANIZED HEALTH CARE EDUCATION/TRAINING PROGRAM

## 2025-04-21 PROCEDURE — 3080F DIAST BP >= 90 MM HG: CPT | Mod: CPTII,S$GLB,, | Performed by: STUDENT IN AN ORGANIZED HEALTH CARE EDUCATION/TRAINING PROGRAM

## 2025-04-21 PROCEDURE — 3077F SYST BP >= 140 MM HG: CPT | Mod: CPTII,S$GLB,, | Performed by: STUDENT IN AN ORGANIZED HEALTH CARE EDUCATION/TRAINING PROGRAM

## 2025-04-21 PROCEDURE — 1159F MED LIST DOCD IN RCRD: CPT | Mod: CPTII,S$GLB,, | Performed by: STUDENT IN AN ORGANIZED HEALTH CARE EDUCATION/TRAINING PROGRAM

## 2025-04-21 PROCEDURE — 99999 PR PBB SHADOW E&M-EST. PATIENT-LVL IV: CPT | Mod: PBBFAC,,, | Performed by: STUDENT IN AN ORGANIZED HEALTH CARE EDUCATION/TRAINING PROGRAM

## 2025-04-21 PROCEDURE — 3288F FALL RISK ASSESSMENT DOCD: CPT | Mod: CPTII,S$GLB,, | Performed by: STUDENT IN AN ORGANIZED HEALTH CARE EDUCATION/TRAINING PROGRAM

## 2025-04-21 PROCEDURE — 3044F HG A1C LEVEL LT 7.0%: CPT | Mod: CPTII,S$GLB,, | Performed by: STUDENT IN AN ORGANIZED HEALTH CARE EDUCATION/TRAINING PROGRAM

## 2025-04-21 PROCEDURE — 1160F RVW MEDS BY RX/DR IN RCRD: CPT | Mod: CPTII,S$GLB,, | Performed by: STUDENT IN AN ORGANIZED HEALTH CARE EDUCATION/TRAINING PROGRAM

## 2025-04-21 RX ORDER — SULFAMETHOXAZOLE AND TRIMETHOPRIM 800; 160 MG/1; MG/1
1 TABLET ORAL 2 TIMES DAILY
Qty: 14 TABLET | Refills: 0 | Status: SHIPPED | OUTPATIENT
Start: 2025-04-21 | End: 2025-04-28

## 2025-04-21 RX ORDER — NYSTATIN 100000 [USP'U]/G
POWDER TOPICAL
Qty: 60 G | Refills: 2 | Status: SHIPPED | OUTPATIENT
Start: 2025-04-21

## 2025-04-21 NOTE — PROGRESS NOTES
Plan:      Concepcion was seen today for mass.    Diagnoses and all orders for this visit:    Rash  -     sulfamethoxazole-trimethoprim 800-160mg (BACTRIM DS) 800-160 mg Tab; Take 1 tablet by mouth 2 (two) times daily. for 7 days    Yeast dermatitis  -     nystatin (MYCOSTATIN) powder; Apply to rash between skin creases up to 4x/day for 7 days during flare-ups.    Contusion of abdominal wall, initial encounter  -     US Soft Tissue Abdomen; Future    Essential hypertension  Comments:  Stressed importance of medication compliance. Pt to check BP at home over next few days and send updated readings.      Follow up in about 1-2 weeks (around 5/5/2025), or if symptoms worsen or fail to improve.    Yecenia Daly MD  04/21/2025    Subjective:      Patient ID: Concepcion Oneill is a 65 y.o. female    Chief Complaint   Patient presents with    Mass     Pt states she has a lump on her stomach   Looks like a rash   Started about a few days ago  Discoloration  Burning off and on   Sensitive to touch      HPI  65 y.o. female with a PMHx as documented below presents to clinic today for the following:    Patient presents today for evaluation of a rash with associated bleeding and drainage.    SKIN CONCERNS:  She reports a skin rash and wound that began around 4/12/25, located within a skin crease along the RLQ abdominal wall. The area appears red with trace bleeding and minimal drainage (seen on cotton pad pt put on area of concern), accompanied by a burning sensation. She has been self-treating with topical and antibiotic creams, and using cotton pads to keep the area dry. She also notes multiple bruises on her body, including a large bruise with an associated lump on her abdomen, with no known trauma or injury. She notes Hx of yeast dermatitis in areas of skin folds, especially in warmer weather.    MEDICATIONS:  She takes daily aspirin and blood pressure medication, which she stopped during vacation (just got back from [place] in [place]) but resumed on the day of visit. BP elevated at today's visit - pt asymptotic.     ALLERGIES:  She has an allergy to Macrobid.         ROS  Constitutional:  Negative for chills and fever.   Respiratory:  Negative for shortness of breath.    Cardiovascular:  Negative for chest pain.   Gastrointestinal:  Negative for abdominal pain, constipation, diarrhea, nausea and vomiting.     Current Outpatient Medications   Medication Instructions    albuterol (VENTOLIN HFA) 90 mcg/actuation inhaler INHALE 2 PUFFS INTO THE LUNGS EVERY 6 (SIX) HOURS AS NEEDED FOR WHEEZING. RESCUE    aspirin (ECOTRIN) 81 mg, Daily    calcium carbonate 650 mg calcium (1,625 mg) tablet 1 tablet, Daily    clotrimazole-betamethasone 1-0.05% (LOTRISONE) cream APPLY TO AFFECTED AREA TWICE A DAY FOR UP TO 2 WEEKS AS NEEDED FOR RASH    cranberry 500 mg Cap Daily    furosemide (LASIX) 20 mg, Oral, Daily    glucosamine HCl/chondroitin ambrose (GLUCOSAMINE-CHONDROITIN ORAL) 2 times daily PRN    ketoconazole (NIZORAL) 2 % shampoo APPLY TOPICALLY ONCE DAILY. FOR 5-7 DAYS THEN AS NEEDED (YEAST INFECTION)    Lactobacillus rhamnosus GG (CULTURELLE) 10 billion cell capsule 1 capsule, Daily    loratadine (CLARITIN) 10 mg, Oral, Daily PRN    multivitamin with minerals tablet 1 tablet, Daily    nystatin (MYCOSTATIN) powder Apply to rash between skin creases up to 4x/day for 7 days during flare-ups.    omega-3 fatty acids/fish oil (FISH OIL OMEGA 3-6-9 ORAL) 2 times daily    pantoprazole (PROTONIX) 40 MG tablet Take 1 tablet (40 mg total) by mouth once daily as needed (acid reflux).    pravastatin (PRAVACHOL) 40 mg, Oral, Nightly, Generic please    sertraline (ZOLOFT) 50 mg, Oral, Daily    sulfamethoxazole-trimethoprim 800-160mg (BACTRIM DS) 800-160 mg Tab 1 tablet, Oral, 2 times daily    triamcinolone acetonide 0.1% (KENALOG) 0.1 % ointment Topical (Top), 2 times daily    triamterene-hydrochlorothiazide 37.5-25 mg (DYAZIDE) 37.5-25 mg per capsule 1  capsule, Oral, Every morning, Take every day at noon    ubidecarenone (COQ-10 ORAL) 100 mg, Daily    vitamin D (VITAMIN D3) 1,000 Units, Daily      Past Medical History:   Diagnosis Date    Arthritis     Bronchitis in child     Essential hypertension 04/01/2021    Gastroesophageal reflux disease 08/09/2020    Generalized anxiety disorder with panic attacks 06/19/2022    Macrocytosis without anemia 01/25/2021    JUANCHO on CPAP 01/25/2021    Other hyperlipidemia 11/10/2020    Seasonal allergic rhinitis due to pollen 03/06/2024    Squamous papilloma 07/20/2022        Objective:      Vitals:    04/21/25 1514 04/21/25 1544   BP: (!) 168/98 (!) 154/96   BP Location: Right arm Left arm   Patient Position: Sitting Sitting   Pulse: 78    SpO2: 98%    Weight: 136 kg (299 lb 15 oz)      Body mass index is 54.86 kg/m².    Physical Exam  Vitals reviewed.   Constitutional:       General: She is not in acute distress.  HENT:      Head: Normocephalic and atraumatic.   Cardiovascular:      Rate and Rhythm: Normal rate.   Pulmonary:      Effort: Pulmonary effort is normal. No respiratory distress.   Skin:            Comments: 1/2 cm lesion as noted in photo above superficial - RLQ of abdominal wall (see diagram - red). Bruising along RLQ abdominal wall with difficulty to appreciate underlying mass, 1-2 inches in diameter (?) - blue.   Neurological:      General: No focal deficit present.      Mental Status: She is alert and oriented to person, place, and time. Mental status is at baseline.        Assessment:       1. Rash    2. Yeast dermatitis    3. Contusion of abdominal wall, initial encounter    4. Essential hypertension        Yecenia Daly MD  [facility]  Office: [phone]   Fax: [phone]  04/21/2025      Disclaimer: This note was partly generated using dictation software which may occasionally result in transcription errors.    Total time spent on this encounter includes face to face  time and non-face to face time preparing to see the patient (eg, review of tests), obtaining and/or reviewing separately obtained history, documenting clinical information in the electronic or other health record, independently interpreting results, and communicating results to the patient/family/caregiver, or care coordinator.      Visit today included increased complexity associated with the care of the episodic problem (see above) addressed and managing the longitudinal care of the patient due to the serious and/or complex managed problem(s) (see above).

## 2025-04-29 ENCOUNTER — HOSPITAL ENCOUNTER (OUTPATIENT)
Dept: RADIOLOGY | Facility: HOSPITAL | Age: 65
Discharge: HOME | End: 2025-04-29
Attending: STUDENT IN AN ORGANIZED HEALTH CARE EDUCATION/TRAINING PROGRAM
Payer: MEDICARE

## 2025-04-29 ENCOUNTER — RESULTS FOLLOW-UP (OUTPATIENT)
Dept: FAMILY MEDICINE | Facility: CLINIC | Age: 65
End: 2025-04-29

## 2025-04-29 DIAGNOSIS — S30.1XXA CONTUSION OF ABDOMINAL WALL, INITIAL ENCOUNTER: ICD-10-CM

## 2025-04-29 PROCEDURE — 76705 ECHO EXAM OF ABDOMEN: CPT | Mod: 26,,, | Performed by: RADIOLOGY

## 2025-04-29 PROCEDURE — 76705 ECHO EXAM OF ABDOMEN: CPT | Mod: TC,PO

## 2025-05-07 ENCOUNTER — OFFICE VISIT (OUTPATIENT)
Dept: OPTOMETRY | Facility: CLINIC | Age: 65
End: 2025-05-07
Payer: MEDICARE

## 2025-05-07 DIAGNOSIS — H25.13 NUCLEAR SCLEROSIS OF BOTH EYES: Primary | ICD-10-CM

## 2025-05-07 DIAGNOSIS — H11.32 SUBCONJUNCTIVAL HEMORRHAGE OF LEFT EYE: ICD-10-CM

## 2025-05-07 DIAGNOSIS — H52.7 REFRACTIVE ERROR: ICD-10-CM

## 2025-05-07 PROCEDURE — 1160F RVW MEDS BY RX/DR IN RCRD: CPT | Mod: CPTII,S$GLB,, | Performed by: OPTOMETRIST

## 2025-05-07 PROCEDURE — 1159F MED LIST DOCD IN RCRD: CPT | Mod: CPTII,S$GLB,, | Performed by: OPTOMETRIST

## 2025-05-07 PROCEDURE — 1101F PT FALLS ASSESS-DOCD LE1/YR: CPT | Mod: CPTII,S$GLB,, | Performed by: OPTOMETRIST

## 2025-05-07 PROCEDURE — 3288F FALL RISK ASSESSMENT DOCD: CPT | Mod: CPTII,S$GLB,, | Performed by: OPTOMETRIST

## 2025-05-07 PROCEDURE — 92004 COMPRE OPH EXAM NEW PT 1/>: CPT | Mod: S$GLB,,, | Performed by: OPTOMETRIST

## 2025-05-07 PROCEDURE — 92015 DETERMINE REFRACTIVE STATE: CPT | Mod: S$GLB,,, | Performed by: OPTOMETRIST

## 2025-05-07 PROCEDURE — 99999 PR PBB SHADOW E&M-EST. PATIENT-LVL III: CPT | Mod: PBBFAC,,, | Performed by: OPTOMETRIST

## 2025-05-07 PROCEDURE — 1126F AMNT PAIN NOTED NONE PRSNT: CPT | Mod: CPTII,S$GLB,, | Performed by: OPTOMETRIST

## 2025-05-07 PROCEDURE — 3044F HG A1C LEVEL LT 7.0%: CPT | Mod: CPTII,S$GLB,, | Performed by: OPTOMETRIST

## 2025-05-07 NOTE — PROGRESS NOTES
HPI    NP needs eyes checked.    Noticing change in [facility] distance. Current glasses are about 7 yrs old.  PT states used to wear glasses just to read now has to wear them all the   time.  Pt says lens are scratched  No dryness or irritation.  Floaters OU for a couple of years.  Uses OTC visine PRN ou.  HBP controlled on current meds..    Last edited by Alisa Falcon on 5/7/2025  8:18 AM.            Assessment /Plan     For exam results, see Encounter Report.    Nuclear sclerosis of both eyes    Refractive error    Subconjunctival hemorrhage of left eye      Educated pt on presence of cataracts and effects on vision. No surgery at this time. Recheck in one year.  2. New Spectacle Rx given, discussed different options for glasses. RTC 1 year routine eye exam.     3. On aspirin, Monitor condition. Patient to report any changes. RTC 1 year recheck.

## 2025-06-03 DIAGNOSIS — E66.813 CLASS 3 SEVERE OBESITY WITH BODY MASS INDEX (BMI) OF 50.0 TO 59.9 IN ADULT, UNSPECIFIED OBESITY TYPE, UNSPECIFIED WHETHER SERIOUS COMORBIDITY PRESENT: ICD-10-CM

## 2025-06-03 DIAGNOSIS — E66.01 CLASS 3 SEVERE OBESITY WITH BODY MASS INDEX (BMI) OF 50.0 TO 59.9 IN ADULT, UNSPECIFIED OBESITY TYPE, UNSPECIFIED WHETHER SERIOUS COMORBIDITY PRESENT: ICD-10-CM

## 2025-06-03 RX ORDER — CLOTRIMAZOLE AND BETAMETHASONE DIPROPIONATE 10; .64 MG/G; MG/G
CREAM TOPICAL
Qty: 45 G | Refills: 3 | Status: SHIPPED | OUTPATIENT
Start: 2025-06-03

## 2025-06-25 ENCOUNTER — OFFICE VISIT (OUTPATIENT)
Dept: FAMILY MEDICINE | Facility: CLINIC | Age: 65
End: 2025-06-25
Payer: MEDICARE

## 2025-06-25 VITALS
WEIGHT: 293 LBS | OXYGEN SATURATION: 95 % | HEART RATE: 80 BPM | TEMPERATURE: 98 F | HEIGHT: 62 IN | RESPIRATION RATE: 16 BRPM | SYSTOLIC BLOOD PRESSURE: 136 MMHG | BODY MASS INDEX: 53.92 KG/M2 | DIASTOLIC BLOOD PRESSURE: 88 MMHG

## 2025-06-25 DIAGNOSIS — J01.90 ACUTE BACTERIAL SINUSITIS: Primary | ICD-10-CM

## 2025-06-25 DIAGNOSIS — B96.89 ACUTE BACTERIAL SINUSITIS: Primary | ICD-10-CM

## 2025-06-25 DIAGNOSIS — I10 ESSENTIAL HYPERTENSION: ICD-10-CM

## 2025-06-25 DIAGNOSIS — R05.1 ACUTE COUGH: ICD-10-CM

## 2025-06-25 PROCEDURE — 3075F SYST BP GE 130 - 139MM HG: CPT | Mod: CPTII,S$GLB,, | Performed by: NURSE PRACTITIONER

## 2025-06-25 PROCEDURE — 1160F RVW MEDS BY RX/DR IN RCRD: CPT | Mod: CPTII,S$GLB,, | Performed by: NURSE PRACTITIONER

## 2025-06-25 PROCEDURE — 99214 OFFICE O/P EST MOD 30 MIN: CPT | Mod: S$GLB,,, | Performed by: NURSE PRACTITIONER

## 2025-06-25 PROCEDURE — 1159F MED LIST DOCD IN RCRD: CPT | Mod: CPTII,S$GLB,, | Performed by: NURSE PRACTITIONER

## 2025-06-25 PROCEDURE — 3079F DIAST BP 80-89 MM HG: CPT | Mod: CPTII,S$GLB,, | Performed by: NURSE PRACTITIONER

## 2025-06-25 PROCEDURE — 1126F AMNT PAIN NOTED NONE PRSNT: CPT | Mod: CPTII,S$GLB,, | Performed by: NURSE PRACTITIONER

## 2025-06-25 PROCEDURE — 3288F FALL RISK ASSESSMENT DOCD: CPT | Mod: CPTII,S$GLB,, | Performed by: NURSE PRACTITIONER

## 2025-06-25 PROCEDURE — 99999 PR PBB SHADOW E&M-EST. PATIENT-LVL V: CPT | Mod: PBBFAC,,, | Performed by: NURSE PRACTITIONER

## 2025-06-25 PROCEDURE — 3044F HG A1C LEVEL LT 7.0%: CPT | Mod: CPTII,S$GLB,, | Performed by: NURSE PRACTITIONER

## 2025-06-25 PROCEDURE — 1101F PT FALLS ASSESS-DOCD LE1/YR: CPT | Mod: CPTII,S$GLB,, | Performed by: NURSE PRACTITIONER

## 2025-06-25 PROCEDURE — 3008F BODY MASS INDEX DOCD: CPT | Mod: CPTII,S$GLB,, | Performed by: NURSE PRACTITIONER

## 2025-06-25 RX ORDER — AMOXICILLIN AND CLAVULANATE POTASSIUM 875; 125 MG/1; MG/1
1 TABLET, FILM COATED ORAL EVERY 12 HOURS
Qty: 14 TABLET | Refills: 0 | Status: SHIPPED | OUTPATIENT
Start: 2025-06-25 | End: 2025-07-02

## 2025-06-25 RX ORDER — PREDNISONE 20 MG/1
20 TABLET ORAL DAILY
Qty: 5 TABLET | Refills: 0 | Status: SHIPPED | OUTPATIENT
Start: 2025-06-25 | End: 2025-06-30

## 2025-06-25 RX ORDER — PROMETHAZINE HYDROCHLORIDE AND DEXTROMETHORPHAN HYDROBROMIDE 6.25; 15 MG/5ML; MG/5ML
5 SYRUP ORAL NIGHTLY PRN
Qty: 150 ML | Refills: 0 | Status: SHIPPED | OUTPATIENT
Start: 2025-06-25 | End: 2025-07-25

## 2025-06-25 NOTE — PROGRESS NOTES
THIS DOCUMENT WAS MADE IN PART WITH VOICE RECOGNITION SOFTWARE.  OCCASIONALLY THIS SOFTWARE WILL MISINTERPRET WORDS OR PHRASES.     Assessment and Plan:    Acute bacterial sinusitis  -     amoxicillin-clavulanate 875-125mg (AUGMENTIN) 875-125 mg per tablet; Take 1 tablet by mouth every 12 (twelve) hours. for 7 days  Dispense: 14 tablet; Refill: 0  -     predniSONE (DELTASONE) 20 MG tablet; Take 1 tablet (20 mg total) by mouth once daily. for 5 days  Dispense: 5 tablet; Refill: 0    Essential hypertension  Comments:  Reports controlled  Continue regimen  Monitor blood pressure    Acute cough  -     predniSONE (DELTASONE) 20 MG tablet; Take 1 tablet (20 mg total) by mouth once daily. for 5 days  Dispense: 5 tablet; Refill: 0  -     promethazine-dextromethorphan (PROMETHAZINE-DM) 6.25-15 mg/5 mL Syrp; Take 5 mLs by mouth nightly as needed (cough).  Dispense: 150 mL; Refill: 0             Visit summary:              Presenting signs and symptoms suggestive of bacterial sinusitis. Covered with Augmentin.  Short course of prednisone to help with sinus inflammation. We discussed symptomatic management with OTC meds and saline rinses. Will add Flonase and Mucinex. Discussed using saline rinse/mist prior to using Flonase to help with effectiveness. I discussed using saltwater gargles and Cepacol throat spray or lozenges to ease sore throat.  Promethazine DM cough syrup as needed for nighttime cough.  Patient counseled on sedative effects of cough syrup. Will take Tylenol as directed for any pain and/or fever. Advised on signs/symptoms of emergent conditions. Patient advised to let us know if symptoms persist or if she develops any new or worsening symptoms.             Follow up if symptoms worsen or fail to improve.   ______________________________________________________________________  Subjective:    History of Present Illness    CHIEF COMPLAINT:  - Patient presents with sinus congestion and associated symptoms that  "started last Friday.    HPI:  Patient reports sinus congestion that began last Friday, accompanied by thick nasal drainage, fever, night sweats, and throat discomfort. She describes ear discomfort and unusual symptoms. She has headaches and extreme fatigue, expressing a desire to rest. She has mild coughing. She has been using an OTC medication (possibly Claritin) for allergies, which she believes has provided some relief. She reports continued headaches despite the medication. She has not used any nasal sprays for her symptoms.           Medications:  Medications Ordered Prior to Encounter[1]    Review of Systems:  Review of Systems   Constitutional:  Positive for fatigue and fever.   HENT:  Positive for congestion, ear pain, postnasal drip, rhinorrhea, sinus pressure and sore throat. Negative for tinnitus and trouble swallowing.    Respiratory:  Positive for cough. Negative for shortness of breath.        Past Medical History:  Past Medical History:   Diagnosis Date    Arthritis     Bronchitis in child     Essential hypertension 04/01/2021    Gastroesophageal reflux disease 08/09/2020    Generalized anxiety disorder with panic attacks 06/19/2022    Macrocytosis without anemia 01/25/2021    JUANCHO on CPAP 01/25/2021    Other hyperlipidemia 11/10/2020    Seasonal allergic rhinitis due to pollen 03/06/2024    Squamous papilloma 07/20/2022       Objective:    Vitals:  Vitals:    06/25/25 0941 06/25/25 1017   BP: (!) 142/89 136/88   Pulse: 80    Resp: 16    Temp: 97.7 °F (36.5 °C)    TempSrc: Oral    SpO2: 95%    Weight: (!) 136.8 kg (301 lb 9.4 oz)    Height: 5' 2" (1.575 m)    PainSc: 0-No pain        Physical Exam  Vitals and nursing note reviewed.   Constitutional:       General: She is not in acute distress.     Appearance: She is obese.   HENT:      Head: Normocephalic and atraumatic.      Right Ear: Tympanic membrane, ear canal and external ear normal.      Left Ear: Tympanic membrane, ear canal and external ear " normal.      Nose: Mucosal edema and rhinorrhea present. Rhinorrhea is purulent.      Right Turbinates: Swollen. Not pale.      Left Turbinates: Swollen. Not pale.      Right Sinus: No maxillary sinus tenderness or frontal sinus tenderness.      Left Sinus: No maxillary sinus tenderness or frontal sinus tenderness.      Mouth/Throat:      Mouth: Mucous membranes are moist.      Pharynx: Uvula midline. No pharyngeal swelling or posterior oropharyngeal erythema.   Eyes:      Conjunctiva/sclera: Conjunctivae normal.   Neck:      Thyroid: No thyromegaly.   Cardiovascular:      Rate and Rhythm: Normal rate and regular rhythm.      Heart sounds: Normal heart sounds.   Pulmonary:      Effort: Pulmonary effort is normal.      Breath sounds: Normal breath sounds.   Musculoskeletal:         General: Normal range of motion.      Cervical back: Normal range of motion and neck supple.   Lymphadenopathy:      Cervical: No cervical adenopathy.   Skin:     General: Skin is warm and dry.   Neurological:      General: No focal deficit present.      Mental Status: She is alert and oriented to person, place, and time.      Cranial Nerves: No cranial nerve deficit.   Psychiatric:         Mood and Affect: Mood normal.         Behavior: Behavior normal.         Thought Content: Thought content normal.         Data:  CMP  Sodium   Date Value Ref Range Status   03/11/2025 141 136 - 145 mmol/L Final     Potassium   Date Value Ref Range Status   03/11/2025 4.3 3.5 - 5.1 mmol/L Final     Chloride   Date Value Ref Range Status   03/11/2025 105 95 - 110 mmol/L Final     CO2   Date Value Ref Range Status   03/11/2025 27 23 - 29 mmol/L Final     Glucose   Date Value Ref Range Status   03/11/2025 98 70 - 110 mg/dL Final     BUN   Date Value Ref Range Status   03/11/2025 17 8 - 23 mg/dL Final     Creatinine   Date Value Ref Range Status   03/11/2025 0.8 0.5 - 1.4 mg/dL Final     Calcium   Date Value Ref Range Status   03/11/2025 9.4 8.7 - 10.5 mg/dL  Final     Total Protein   Date Value Ref Range Status   03/11/2025 7.0 6.0 - 8.4 g/dL Final     Albumin   Date Value Ref Range Status   03/11/2025 3.8 3.5 - 5.2 g/dL Final     Total Bilirubin   Date Value Ref Range Status   03/11/2025 0.5 0.1 - 1.0 mg/dL Final     Comment:     For infants and newborns, interpretation of results should be based  on gestational age, weight and in agreement with clinical  observations.    Premature Infant recommended reference ranges:  Up to 24 hours.............<8.0 mg/dL  Up to 48 hours............<12.0 mg/dL  3-5 days..................<15.0 mg/dL  6-29 days.................<15.0 mg/dL       Alkaline Phosphatase   Date Value Ref Range Status   03/11/2025 48 40 - 150 U/L Final     AST   Date Value Ref Range Status   03/11/2025 27 10 - 40 U/L Final     ALT   Date Value Ref Range Status   03/11/2025 29 10 - 44 U/L Final     Anion Gap   Date Value Ref Range Status   03/11/2025 9 8 - 16 mmol/L Final     eGFR   Date Value Ref Range Status   03/11/2025 >60.0 >60 mL/min/1.73 m^2 Final    .      Medical history reviewed, Medications reconciled.          JACIEL ReyC  Family Medicine           [1]   Current Outpatient Medications on File Prior to Visit   Medication Sig Dispense Refill    aspirin (ECOTRIN) 81 MG EC tablet Take 81 mg by mouth once daily.      calcium carbonate 650 mg calcium (1,625 mg) tablet Take 1 tablet by mouth once daily.      clotrimazole-betamethasone 1-0.05% (LOTRISONE) cream APPLY TO AFFECTED AREA TWICE A DAY FOR UP TO 2 WEEKS AS NEEDED FOR RASH 45 g 3    cranberry 500 mg Cap Take by mouth Daily.      furosemide (LASIX) 20 MG tablet Take 1 tablet (20 mg total) by mouth once daily. 90 tablet 3    glucosamine HCl/chondroitin ambrose (GLUCOSAMINE-CHONDROITIN ORAL) Take by mouth 2 (two) times daily as needed.       ketoconazole (NIZORAL) 2 % shampoo APPLY TOPICALLY ONCE DAILY. FOR 5-7 DAYS THEN AS NEEDED (YEAST INFECTION) 120 mL 0    Lactobacillus rhamnosus GG  (CULTURELLE) 10 billion cell capsule Take 1 capsule by mouth once daily.      multivitamin with minerals tablet Take 1 tablet by mouth once daily.      nystatin (MYCOSTATIN) powder Apply to rash between skin creases up to 4x/day for 7 days during flare-ups. 60 g 2    omega-3 fatty acids/fish oil (FISH OIL OMEGA 3-6-9 ORAL) Take by mouth 2 (two) times a day.      pantoprazole (PROTONIX) 40 MG tablet Take 1 tablet (40 mg total) by mouth once daily as needed (acid reflux). 90 tablet 1    pravastatin (PRAVACHOL) 40 MG tablet Take 1 tablet (40 mg total) by mouth every evening. Generic please 90 tablet 2    sertraline (ZOLOFT) 50 MG tablet Take 1 tablet (50 mg total) by mouth once daily. 90 tablet 3    triamterene-hydrochlorothiazide 37.5-25 mg (DYAZIDE) 37.5-25 mg per capsule Take 1 capsule by mouth every morning. Take every day at noon 90 capsule 2    ubidecarenone (COQ-10 ORAL) Take 100 mg by mouth once daily.      vitamin D (VITAMIN D3) 1000 units Tab Take 1,000 Units by mouth once daily.      albuterol (VENTOLIN HFA) 90 mcg/actuation inhaler INHALE 2 PUFFS INTO THE LUNGS EVERY 6 (SIX) HOURS AS NEEDED FOR WHEEZING. RESCUE (Patient not taking: Reported on 6/25/2025) 54 g 1    loratadine (CLARITIN) 10 mg tablet Take 1 tablet (10 mg total) by mouth daily as needed for Allergies (congestion). 90 tablet 1    triamcinolone acetonide 0.1% (KENALOG) 0.1 % ointment Apply topically 2 (two) times daily. (Patient not taking: Reported on 3/6/2024) 80 g 0     Current Facility-Administered Medications on File Prior to Visit   Medication Dose Route Frequency Provider Last Rate Last Admin    influenza (High-Dose) (Fluzone High-Dose) 180 mcg/0.5 mL IM vaccine (> or = 66 yo) 0.5 mL  0.5 mL Intramuscular 1 time in Clinic/HOD

## 2025-06-25 NOTE — PATIENT INSTRUCTIONS
OVER THE COUNTER RECOMMENDATIONS/SUGGESTIONS.    Sinus massage    Humidifier/steam shower     Use Nasal Saline to mechanically move any post nasal drip from your eustachian tube or from the back of your throat. (do this prior to using Flonase, to help with effectiveness)    Use Flonase 1spray/nostril twice a day. It is a local acting steroid nasal spray, if you develop a bloody nose, stop using the medication immediately.     Use warm salt water gargles to ease your throat pain. Warm salt water gargles as needed for sore throat-  1/2 tsp salt to 1 cup warm water, gargle as desired.     May try Cepacol spray or lozenges to help with sore throat    Use an antihistamine such as Claritin, Zyrtec or Allegra to dry you out.      Use mucinex (guaifenisin) to break up mucous up to 2400mg/day to loosen any mucous.     Make sure to stay well hydrated.     May try honey- which is a natural cough suppressant      Tylenol up to 4,000 mg a day is safe for short periods and can be used for body aches, pain, and fever. However in high doses and prolonged use it can cause liver irritation.        Please go to ER/urgent care if symptoms persist/worsen, especially if after hours.      Do not hesitate to get in touch with me should you have any further questions.      Thank you for trusting me with your care!  I wish you health and happiness.     PATSY Rey

## 2025-08-12 ENCOUNTER — HOSPITAL ENCOUNTER (OUTPATIENT)
Dept: RADIOLOGY | Facility: HOSPITAL | Age: 65
Discharge: HOME | End: 2025-08-12
Attending: OBSTETRICS & GYNECOLOGY
Payer: MEDICARE

## 2025-08-12 DIAGNOSIS — R92.8 ABNORMAL MAMMOGRAM OF LEFT BREAST: ICD-10-CM

## 2025-08-12 PROCEDURE — 77065 DX MAMMO INCL CAD UNI: CPT | Mod: TC,PO,LT

## 2025-08-12 PROCEDURE — 76642 ULTRASOUND BREAST LIMITED: CPT | Mod: 26,LT,, | Performed by: RADIOLOGY

## 2025-08-12 PROCEDURE — 76642 ULTRASOUND BREAST LIMITED: CPT | Mod: TC,PO,LT

## 2025-08-12 PROCEDURE — 77061 BREAST TOMOSYNTHESIS UNI: CPT | Mod: 26,LT,, | Performed by: RADIOLOGY

## 2025-08-12 PROCEDURE — 77065 DX MAMMO INCL CAD UNI: CPT | Mod: 26,LT,, | Performed by: RADIOLOGY

## 2025-08-30 ENCOUNTER — OFFICE VISIT (OUTPATIENT)
Dept: URGENT CARE | Facility: CLINIC | Age: 65
End: 2025-08-30
Payer: MEDICARE